# Patient Record
Sex: FEMALE | Race: WHITE | NOT HISPANIC OR LATINO | Employment: FULL TIME | URBAN - METROPOLITAN AREA
[De-identification: names, ages, dates, MRNs, and addresses within clinical notes are randomized per-mention and may not be internally consistent; named-entity substitution may affect disease eponyms.]

---

## 2017-06-23 ENCOUNTER — OFFICE VISIT (OUTPATIENT)
Dept: FAMILY MEDICINE CLINIC | Facility: CLINIC | Age: 62
End: 2017-06-23

## 2017-06-23 VITALS
DIASTOLIC BLOOD PRESSURE: 71 MMHG | HEIGHT: 63 IN | OXYGEN SATURATION: 98 % | SYSTOLIC BLOOD PRESSURE: 145 MMHG | RESPIRATION RATE: 16 BRPM | HEART RATE: 66 BPM | BODY MASS INDEX: 29.41 KG/M2 | WEIGHT: 166 LBS | TEMPERATURE: 98.1 F

## 2017-06-23 DIAGNOSIS — J02.9 SORE THROAT: ICD-10-CM

## 2017-06-23 DIAGNOSIS — M89.8X1 COLLAR BONE PAIN: Primary | ICD-10-CM

## 2017-06-23 PROCEDURE — 99214 OFFICE O/P EST MOD 30 MIN: CPT | Performed by: FAMILY MEDICINE

## 2017-06-23 PROCEDURE — 71020 XR CHEST PA AND LATERAL: CPT | Performed by: FAMILY MEDICINE

## 2017-06-23 PROCEDURE — 73000 X-RAY EXAM OF COLLAR BONE: CPT | Performed by: FAMILY MEDICINE

## 2017-06-23 RX ORDER — PANTOPRAZOLE SODIUM 40 MG/1
40 TABLET, DELAYED RELEASE ORAL DAILY
Qty: 30 TABLET | Refills: 2 | Status: SHIPPED | OUTPATIENT
Start: 2017-06-23 | End: 2017-10-06 | Stop reason: SDUPTHER

## 2017-06-23 NOTE — PATIENT INSTRUCTIONS
Exercise 30 minutes most days of the week  Sleep 6-8 hours each night if possible  Low fat, low cholesterol diet   we discussed prescribed medications and how to take them   make sure you get results of any labs/studies ordered today  Low glycemic index diet  Aleve 1 bid

## 2017-06-23 NOTE — PROGRESS NOTES
"Subjective   Bernadette Perez is a 61 y.o. female.     History of Present Illness   Chief Complaint:   Chief Complaint   Patient presents with   • Collar bone pain'   • Sore Throat       Bernadette Perez 61 y.o. female who presents today for collor bone pain and a sore throat that has been present for 6 months. The pain is gradually getting worse. Her pain in the office today is a 4 out of 10.   She has ? Silent gerd,   Arthritis  Right  Proximal  Clavicle.   she has a history of   Patient Active Problem List   Diagnosis   • Arthritis   • Allergic rhinitis   • Hx of colonic polyps   • Family history of diabetes mellitus type II   • Family history of heart disease   • Hyperlipidemia   • Elevated liver enzymes   .  Since the last visit, she has overall felt well.  she has been compliant with   Current Outpatient Prescriptions:   •  fluticasone (FLONASE) 50 MCG/ACT nasal spray, 1 spray into each nostril daily. Administer 2 sprays in each nostril for each dose., Disp: , Rfl:   •  Oxymetazoline HCl (NASAL SPRAY NA), into each nostril., Disp: , Rfl:   •  triamcinolone (KENALOG) 0.1 % cream, APPLY TO AFFECTED AREA TWICE A DAY AS NEEDED, Disp: , Rfl: 3.  she denies medication side effects.    All of the chronic condition(s) listed above are stable w/o issues.    /71  Pulse 66  Temp 98.1 °F (36.7 °C) (Oral)   Resp 16  Ht 63\" (160 cm)  Wt 166 lb (75.3 kg)  SpO2 98%  BMI 29.41 kg/m2    Results for orders placed or performed in visit on 08/08/16   Hepatic function panel   Result Value Ref Range    Total Protein 7.1 6.0 - 8.5 g/dL    Albumin 4.80 3.50 - 5.20 g/dL    Total Bilirubin 0.6 0.1 - 1.2 mg/dL    Bilirubin, Direct <0.2 0.0 - 0.3 mg/dL    Alkaline Phosphatase 132 (H) 39 - 117 U/L    AST (SGOT) 38 (H) 1 - 32 U/L    ALT (SGPT) 39 (H) 1 - 33 U/L         The following portions of the patient's history were reviewed and updated as appropriate: allergies, current medications, past family history, past medical history, " past social history, past surgical history and problem list.    Review of Systems   Constitutional: Negative for activity change, appetite change and unexpected weight change.   HENT: Positive for sore throat.    Eyes: Negative for visual disturbance.   Respiratory: Negative for chest tightness and shortness of breath.    Cardiovascular: Negative for chest pain and palpitations.   Musculoskeletal:        Collar bone pain   Skin: Negative for color change.   Neurological: Negative for syncope and speech difficulty.   Psychiatric/Behavioral: Negative for confusion and decreased concentration.       Objective   Physical Exam   Constitutional: She is oriented to person, place, and time. She appears well-developed and well-nourished.   HENT:   Head: Normocephalic and atraumatic.   Mouth/Throat: Oropharynx is clear and moist. No oropharyngeal exudate.   Eyes: EOM are normal. Pupils are equal, round, and reactive to light.   Neck: Normal range of motion. Neck supple. No thyromegaly present.   Cardiovascular: Normal rate and regular rhythm.    Pulmonary/Chest: Effort normal and breath sounds normal.   Abdominal: Soft.   Musculoskeletal: She exhibits tenderness.    Tender and swelling right proximal clavicle   Arthritis     Lymphadenopathy:     She has no cervical adenopathy.   Neurological: She is alert and oriented to person, place, and time.   Skin: Skin is warm and dry.   Psychiatric: She has a normal mood and affect. Her behavior is normal.   Nursing note and vitals reviewed.      Assessment/Plan   Bernadette was seen today for collar bone pain' and sore throat.    Diagnoses and all orders for this visit:    Collar bone pain  Comments:  swelling right proximal clavicle  Orders:  -     XR Chest PA & Lateral  -     XR clavicle right    gerd  -     XR Chest PA & Lateral  -     XR clavicle right  -     Ambulatory Referral to ENT (Otolaryngology)    Other orders  -     pantoprazole (PROTONIX) 40 MG EC tablet; Take 1 tablet by  mouth Daily.

## 2017-07-17 ENCOUNTER — OFFICE VISIT (OUTPATIENT)
Dept: FAMILY MEDICINE CLINIC | Facility: CLINIC | Age: 62
End: 2017-07-17

## 2017-07-17 VITALS
HEART RATE: 71 BPM | DIASTOLIC BLOOD PRESSURE: 66 MMHG | WEIGHT: 159 LBS | OXYGEN SATURATION: 95 % | RESPIRATION RATE: 16 BRPM | SYSTOLIC BLOOD PRESSURE: 111 MMHG | HEIGHT: 63 IN | BODY MASS INDEX: 28.17 KG/M2 | TEMPERATURE: 99.2 F

## 2017-07-17 DIAGNOSIS — M19.90 ARTHRITIS: Primary | ICD-10-CM

## 2017-07-17 DIAGNOSIS — K21.9 GASTROESOPHAGEAL REFLUX DISEASE, ESOPHAGITIS PRESENCE NOT SPECIFIED: ICD-10-CM

## 2017-07-17 PROCEDURE — 99213 OFFICE O/P EST LOW 20 MIN: CPT | Performed by: FAMILY MEDICINE

## 2017-07-17 NOTE — PATIENT INSTRUCTIONS
Exercise 30 minutes most days of the week  Sleep 6-8 hours each night if possible  Low fat, low cholesterol diet   we discussed prescribed medications and how to take them   make sure you get results of any labs/studies ordered today  Low glycemic index diet   See me after sees dr MONCADA AND DR CRENSHAW

## 2017-07-17 NOTE — PROGRESS NOTES
"Subjective   Bernadette Perez is a 61 y.o. female.     History of Present Illness   Chief Complaint:   Chief Complaint   Patient presents with   • Heartburn   • Arthritis       Bernadette Perez 61 y.o. female who presents today for a 3 week follow up regarding collor bone pain and a sore throat that has been present for 6 months. The pain is gradually getting worse. Her pain in the office today is a 4-5 out of 10. She has a diagnosis of Arthritis Right Proximal Clavicle and GERD. Throat pain no better with protonix   Sees dr sami mcmahon weeeryan.  she has a history of   Patient Active Problem List   Diagnosis   • Arthritis   • Allergic rhinitis   • Hx of colonic polyps   • Family history of diabetes mellitus type II   • Family history of heart disease   • Hyperlipidemia   • Elevated liver enzymes   • Gastroesophageal reflux disease   .  Since the last visit, she has overall felt well.  she has been compliant with   Current Outpatient Prescriptions:   •  fluticasone (FLONASE) 50 MCG/ACT nasal spray, 1 spray into each nostril daily. Administer 2 sprays in each nostril for each dose., Disp: , Rfl:   •  Oxymetazoline HCl (NASAL SPRAY NA), into each nostril., Disp: , Rfl:   •  pantoprazole (PROTONIX) 40 MG EC tablet, Take 1 tablet by mouth Daily., Disp: 30 tablet, Rfl: 2  •  triamcinolone (KENALOG) 0.1 % cream, APPLY TO AFFECTED AREA TWICE A DAY AS NEEDED, Disp: , Rfl: 3.  she denies medication side effects.    All of the chronic condition(s) listed above are stable w/o issues.    /66  Pulse 71  Temp 99.2 °F (37.3 °C) (Oral)   Resp 16  Ht 63\" (160 cm)  Wt 159 lb (72.1 kg)  SpO2 95%  BMI 28.17 kg/m2    The following portions of the patient's history were reviewed and updated as appropriate: allergies, current medications, past family history, past medical history, past social history, past surgical history and problem list.    Review of Systems   Constitutional: Negative for activity change, appetite change and " unexpected weight change.   Eyes: Negative for visual disturbance.   Respiratory: Negative for chest tightness and shortness of breath.    Cardiovascular: Negative for chest pain and palpitations.   Musculoskeletal:        Clavicle pain   Skin: Negative for color change.   Neurological: Negative for syncope and speech difficulty.   Psychiatric/Behavioral: Negative for confusion and decreased concentration.       Objective   Physical Exam   Constitutional: She is oriented to person, place, and time. She appears well-developed and well-nourished.   HENT:   Head: Normocephalic.   Nose: Nose normal.   Mouth/Throat: Oropharynx is clear and moist.   Eyes: EOM are normal. Pupils are equal, round, and reactive to light.   Neck: Normal range of motion. Neck supple.   Cardiovascular: Normal rate.    Pulmonary/Chest: Effort normal.   Abdominal: Soft.   Musculoskeletal: Normal range of motion. She exhibits tenderness.   Tender and swelling right proximal clavicle   Xray normal   Lymphadenopathy:     She has no cervical adenopathy.   Neurological: She is oriented to person, place, and time.   Skin: Skin is warm and dry.   Psychiatric: She has a normal mood and affect. Her behavior is normal.   Nursing note and vitals reviewed.      Assessment/Plan   Bernadette was seen today for heartburn and arthritis.    Diagnoses and all orders for this visit:    Arthritis  Comments:  right proximal clavicle   Orders:  -     Ambulatory Referral to Orthopedic Surgery    Gastroesophageal reflux disease, esophagitis presence not specified

## 2017-07-25 DIAGNOSIS — M89.8X1 COLLAR BONE PAIN: Primary | ICD-10-CM

## 2017-07-31 ENCOUNTER — HOSPITAL ENCOUNTER (OUTPATIENT)
Dept: CT IMAGING | Facility: HOSPITAL | Age: 62
Discharge: HOME | End: 2017-07-31
Admitting: FAMILY MEDICINE

## 2017-07-31 ENCOUNTER — APPOINTMENT (OUTPATIENT)
Dept: CT IMAGING | Facility: HOSPITAL | Age: 62
End: 2017-07-31

## 2017-07-31 PROCEDURE — 71250 CT THORAX DX C-: CPT

## 2017-08-09 ENCOUNTER — OFFICE VISIT (OUTPATIENT)
Dept: FAMILY MEDICINE CLINIC | Facility: CLINIC | Age: 62
End: 2017-08-09

## 2017-08-09 VITALS
TEMPERATURE: 98.3 F | WEIGHT: 163 LBS | BODY MASS INDEX: 28.88 KG/M2 | SYSTOLIC BLOOD PRESSURE: 132 MMHG | RESPIRATION RATE: 16 BRPM | HEIGHT: 63 IN | OXYGEN SATURATION: 97 % | DIASTOLIC BLOOD PRESSURE: 80 MMHG | HEART RATE: 77 BPM

## 2017-08-09 DIAGNOSIS — M89.8X1 COLLAR BONE PAIN: ICD-10-CM

## 2017-08-09 DIAGNOSIS — K21.9 GASTROESOPHAGEAL REFLUX DISEASE, ESOPHAGITIS PRESENCE NOT SPECIFIED: Primary | ICD-10-CM

## 2017-08-09 DIAGNOSIS — R05.9 COUGH: ICD-10-CM

## 2017-08-09 PROCEDURE — 99214 OFFICE O/P EST MOD 30 MIN: CPT | Performed by: FAMILY MEDICINE

## 2017-08-09 RX ORDER — OLOPATADINE HYDROCHLORIDE 665 UG/1
SPRAY NASAL
Refills: 1 | COMMUNITY
Start: 2017-07-20 | End: 2022-11-15

## 2017-08-09 NOTE — PROGRESS NOTES
"Subjective   Bernadette Perez is a 61 y.o. female.     History of Present Illness   Chief Complaint:   Chief Complaint   Patient presents with   • Right upper chest pain     CT results   • Collar bone pain   • Heartburn       Bernadette Perez 61 y.o. female who presents today to follow up for collar bone pain (neck pain) and a sore throat that has been present for over 6 months. Her pain in the office today is a 2 out of 10. I reviewed her CT scan of her chest. The CT showed that there is either a subacute or ununited chronic chondral fracture at the right 1st costochondral sternal articulation with surrounding bony proliferation and osteophytes. There is no evidence for an acute fracture. There is also asymmetrical degenerative change at the right sternoclavicular joint with synovial thickening and proliferative change. The irregular approximately 8 mm right lower lobe nodular opacity is age indeterminate and needs to be followed. I will get a second reading from radiologist.  She feels coughing from that area she has a history of   Patient Active Problem List   Diagnosis   • Arthritis   • Allergic rhinitis   • Hx of colonic polyps   • Family history of diabetes mellitus type II   • Family history of heart disease   • Hyperlipidemia   • Elevated liver enzymes   • Gastroesophageal reflux disease   .  Since the last visit, she has overall felt well.  she has been compliant with .  she denies medication side effects.    All of the chronic condition(s) listed above are stable w/o issues.    /80  Pulse 77  Temp 98.3 °F (36.8 °C) (Oral)   Resp 16  Ht 63\" (160 cm)  Wt 163 lb (73.9 kg)  SpO2 97%  BMI 28.87 kg/m2    The following portions of the patient's history were reviewed and updated as appropriate: allergies, current medications, past family history, past medical history, past social history, past surgical history and problem list.    Review of Systems   Constitutional: Negative for activity change, appetite " change and unexpected weight change.   Eyes: Negative for visual disturbance.   Respiratory: Negative for apnea and shortness of breath.    Cardiovascular: Negative for chest pain and palpitations.   Musculoskeletal: Positive for neck pain.   Skin: Negative for color change.   Neurological: Negative for syncope and speech difficulty.   Psychiatric/Behavioral: Negative for confusion and decreased concentration.       Objective   Physical Exam   Constitutional: She is oriented to person, place, and time. She appears well-developed and well-nourished.   HENT:   Head: Normocephalic.   Mouth/Throat: Oropharynx is clear and moist.   Eyes: Pupils are equal, round, and reactive to light.   Neck: Neck supple. No tracheal deviation present. No thyromegaly present.   Pulmonary/Chest: Breath sounds normal.   Musculoskeletal: Normal range of motion.   Swelling right sterno-clavicular joint   Lymphadenopathy:     She has no cervical adenopathy.   Neurological: She is alert and oriented to person, place, and time.   Skin: No rash noted.       Assessment/Plan   Bernadette was seen today for right upper chest pain, collar bone pain and heartburn.    Diagnoses and all orders for this visit:    Gastroesophageal reflux disease, esophagitis presence not specified  -     US Thyroid    Collar bone pain  -     US Thyroid    Cough  -     US Thyroid

## 2017-08-09 NOTE — PATIENT INSTRUCTIONS
Exercise 30 minutes most days of the week  Sleep 6-8 hours each night if possible  Low fat, low cholesterol diet   we discussed prescribed medications and how to take them   make sure you get results of any labs/studies ordered today  Low glycemic index diet     Sees dr live      I will talk with radiologist

## 2017-08-15 ENCOUNTER — HOSPITAL ENCOUNTER (OUTPATIENT)
Dept: ULTRASOUND IMAGING | Facility: HOSPITAL | Age: 62
Discharge: HOME | End: 2017-08-15
Admitting: FAMILY MEDICINE

## 2017-08-15 PROCEDURE — 76536 US EXAM OF HEAD AND NECK: CPT

## 2017-08-22 ENCOUNTER — OFFICE VISIT (OUTPATIENT)
Dept: ORTHOPEDIC SURGERY | Facility: CLINIC | Age: 62
End: 2017-08-22

## 2017-08-22 VITALS — BODY MASS INDEX: 28.95 KG/M2 | TEMPERATURE: 97.9 F | WEIGHT: 163.4 LBS | HEIGHT: 63 IN

## 2017-08-22 DIAGNOSIS — M89.8X1 PAIN OF RIGHT CLAVICLE: Primary | ICD-10-CM

## 2017-08-22 PROCEDURE — 73000 X-RAY EXAM OF COLLAR BONE: CPT | Performed by: ORTHOPAEDIC SURGERY

## 2017-08-22 PROCEDURE — 99203 OFFICE O/P NEW LOW 30 MIN: CPT | Performed by: ORTHOPAEDIC SURGERY
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
## 2017-10-06 RX ORDER — PANTOPRAZOLE SODIUM 40 MG/1
TABLET, DELAYED RELEASE ORAL
Qty: 30 TABLET | Refills: 0 | Status: SHIPPED | OUTPATIENT
Start: 2017-10-06 | End: 2018-04-11

## 2017-10-12 ENCOUNTER — APPOINTMENT (OUTPATIENT)
Dept: WOMENS IMAGING | Facility: HOSPITAL | Age: 62
End: 2017-10-12

## 2017-10-12 PROCEDURE — 77067 SCR MAMMO BI INCL CAD: CPT | Performed by: RADIOLOGY

## 2017-10-12 PROCEDURE — 77063 BREAST TOMOSYNTHESIS BI: CPT | Performed by: RADIOLOGY

## 2017-12-04 DIAGNOSIS — R91.1 NODULE OF RIGHT LUNG: Primary | ICD-10-CM

## 2017-12-04 DIAGNOSIS — R93.89 ABNORMAL CHEST CT: ICD-10-CM

## 2017-12-04 RX ORDER — PANTOPRAZOLE SODIUM 40 MG/1
TABLET, DELAYED RELEASE ORAL
Qty: 30 TABLET | Refills: 0 | OUTPATIENT
Start: 2017-12-04

## 2017-12-07 ENCOUNTER — DOCUMENTATION (OUTPATIENT)
Dept: FAMILY MEDICINE CLINIC | Facility: CLINIC | Age: 62
End: 2017-12-07

## 2017-12-07 NOTE — PROGRESS NOTES
She needs appointment  After ctscan chest  And needs labs and I am waiting for call dr leroy about us thyroid

## 2017-12-11 ENCOUNTER — HOSPITAL ENCOUNTER (OUTPATIENT)
Dept: CT IMAGING | Facility: HOSPITAL | Age: 62
Discharge: HOME | End: 2017-12-11
Admitting: FAMILY MEDICINE

## 2017-12-11 PROCEDURE — 0 IOPAMIDOL 61 % SOLUTION: Performed by: FAMILY MEDICINE

## 2017-12-11 PROCEDURE — 71260 CT THORAX DX C+: CPT

## 2017-12-11 PROCEDURE — 82565 ASSAY OF CREATININE: CPT

## 2017-12-11 RX ADMIN — IOPAMIDOL 75 ML: 612 INJECTION, SOLUTION INTRAVENOUS at 08:15

## 2017-12-13 LAB — CREAT BLDA-MCNC: 0.8 MG/DL (ref 0.6–1.3)

## 2017-12-27 ENCOUNTER — OFFICE VISIT (OUTPATIENT)
Dept: FAMILY MEDICINE CLINIC | Facility: CLINIC | Age: 62
End: 2017-12-27

## 2017-12-27 VITALS
DIASTOLIC BLOOD PRESSURE: 77 MMHG | BODY MASS INDEX: 29.77 KG/M2 | HEIGHT: 63 IN | SYSTOLIC BLOOD PRESSURE: 133 MMHG | RESPIRATION RATE: 16 BRPM | HEART RATE: 77 BPM | WEIGHT: 168 LBS | TEMPERATURE: 98 F | OXYGEN SATURATION: 98 %

## 2017-12-27 DIAGNOSIS — R91.1 NODULE OF RIGHT LUNG: Primary | ICD-10-CM

## 2017-12-27 PROCEDURE — 99214 OFFICE O/P EST MOD 30 MIN: CPT | Performed by: FAMILY MEDICINE

## 2018-04-11 ENCOUNTER — OFFICE VISIT (OUTPATIENT)
Dept: FAMILY MEDICINE CLINIC | Facility: CLINIC | Age: 63
End: 2018-04-11

## 2018-04-11 VITALS
WEIGHT: 168 LBS | RESPIRATION RATE: 16 BRPM | DIASTOLIC BLOOD PRESSURE: 82 MMHG | OXYGEN SATURATION: 98 % | TEMPERATURE: 98.2 F | HEART RATE: 68 BPM | SYSTOLIC BLOOD PRESSURE: 136 MMHG | BODY MASS INDEX: 29.77 KG/M2 | HEIGHT: 63 IN

## 2018-04-11 DIAGNOSIS — F32.A DEPRESSION, UNSPECIFIED DEPRESSION TYPE: Primary | ICD-10-CM

## 2018-04-11 DIAGNOSIS — F41.9 ANXIETY: ICD-10-CM

## 2018-04-11 PROCEDURE — 99214 OFFICE O/P EST MOD 30 MIN: CPT | Performed by: FAMILY MEDICINE

## 2018-04-11 RX ORDER — PAROXETINE HYDROCHLORIDE 20 MG/1
20 TABLET, FILM COATED ORAL EVERY MORNING
Qty: 30 TABLET | Refills: 2 | Status: SHIPPED | OUTPATIENT
Start: 2018-04-11 | End: 2018-07-05 | Stop reason: SDUPTHER

## 2018-04-11 NOTE — PATIENT INSTRUCTIONS
Exercise 30 minutes most days of the week  Sleep 6-8 hours each night if possible  Low fat, low cholesterol diet   we discussed prescribed medications and how to take them   make sure you get results of any labs/studies ordered today  Low glycemic index diet     Do counselling thru insuuance card  Call if any problems

## 2018-04-11 NOTE — PROGRESS NOTES
"Subjective   Bernadette Perez is a 62 y.o. female.     History of Present Illness   Chief Complaint:   Chief Complaint   Patient presents with   • Depression   • Anxiety       Bernadette Perez 62 y.o. female who presents today with complaints of anxiety and depression that has been gradually getting worse over the last 6 months. She was on Paxil 8 years ago during menopause. Her depression score was 17.  Will start paxil  20mg  Take 10mg  1st 4 days  See me 1 month later  Start counselling thru insurance card or [employer].    she has a problem list of   Patient Active Problem List   Diagnosis   • Arthritis   • Allergic rhinitis   • Hx of colonic polyps   • Family history of diabetes mellitus type II   • Family history of heart disease   • Hyperlipidemia   • Elevated liver enzymes   • Gastroesophageal reflux disease   .  Since the last visit, she has overall felt well.  she has been compliant with .  she denies medication side effects.    All of the chronic condition(s) listed above are stable w/o issues.    /82   Pulse 68   Temp 98.2 °F (36.8 °C) (Oral)   Resp 16   Ht 160 cm (63\")   Wt 76.2 kg (168 lb)   SpO2 98%   BMI 29.76 kg/m²     The following portions of the patient's history were reviewed and updated as appropriate: allergies, current medications, past family history, past medical history, past social history, past surgical history and problem list.    Review of Systems   Constitutional: Negative for activity change, appetite change, fatigue and unexpected weight change.   HENT: Negative for congestion.    Respiratory: Negative for chest tightness and shortness of breath.    Cardiovascular: Negative for chest pain and palpitations.   Gastrointestinal: Negative for abdominal pain and constipation.   Endocrine: Negative for cold intolerance, heat intolerance, polydipsia, polyphagia and polyuria.   Genitourinary: Negative for difficulty urinating, dysuria and menstrual problem.   Musculoskeletal: " Negative for arthralgias.   Skin: Negative for rash.   Neurological: Negative for headaches.   Psychiatric/Behavioral: Negative for behavioral problems, dysphoric mood and sleep disturbance.       Objective   Physical Exam   Constitutional: She is oriented to person, place, and time. She appears well-developed and well-nourished.   HENT:   Head: Normocephalic.   Eyes: Pupils are equal, round, and reactive to light.   Neck: Normal range of motion.   Cardiovascular: Normal rate and regular rhythm.    Pulmonary/Chest: Effort normal.   Neurological: She is alert and oriented to person, place, and time. She has normal reflexes.   Skin: Skin is warm.   Psychiatric: She has a normal mood and affect.   Nursing note and vitals reviewed.      Assessment/Plan   Bernadette was seen today for depression and anxiety.    Diagnoses and all orders for this visit:    Depression, unspecified depression type    Anxiety    Other orders  -     PARoxetine (PAXIL) 20 MG tablet; Take 1 tablet by mouth Every Morning.

## 2018-05-08 ENCOUNTER — OFFICE VISIT (OUTPATIENT)
Dept: FAMILY MEDICINE CLINIC | Facility: CLINIC | Age: 63
End: 2018-05-08

## 2018-05-08 VITALS
SYSTOLIC BLOOD PRESSURE: 130 MMHG | HEIGHT: 63 IN | RESPIRATION RATE: 16 BRPM | WEIGHT: 167 LBS | BODY MASS INDEX: 29.59 KG/M2 | TEMPERATURE: 97.4 F | OXYGEN SATURATION: 97 % | DIASTOLIC BLOOD PRESSURE: 70 MMHG | HEART RATE: 68 BPM

## 2018-05-08 DIAGNOSIS — F41.8 DEPRESSION WITH ANXIETY: Primary | ICD-10-CM

## 2018-05-08 PROCEDURE — 99213 OFFICE O/P EST LOW 20 MIN: CPT | Performed by: FAMILY MEDICINE

## 2018-05-08 NOTE — PATIENT INSTRUCTIONS
Exercise 30 minutes most days of the week  Sleep 6-8 hours each night if possible  Low fat, low cholesterol diet   we discussed prescribed medications and how to take them   make sure you get results of any labs/studies ordered today  Low glycemic index diet     Call if any problems   See me 3 months

## 2018-07-04 RX ORDER — PAROXETINE HYDROCHLORIDE 20 MG/1
20 TABLET, FILM COATED ORAL EVERY MORNING
Qty: 30 TABLET | Refills: 2 | Status: CANCELLED | OUTPATIENT
Start: 2018-07-04

## 2018-07-05 ENCOUNTER — TELEPHONE (OUTPATIENT)
Dept: FAMILY MEDICINE CLINIC | Facility: CLINIC | Age: 63
End: 2018-07-05

## 2018-07-05 RX ORDER — PAROXETINE HYDROCHLORIDE 20 MG/1
20 TABLET, FILM COATED ORAL EVERY MORNING
Qty: 90 TABLET | Refills: 0 | Status: SHIPPED | OUTPATIENT
Start: 2018-07-05 | End: 2018-10-02 | Stop reason: SDUPTHER

## 2018-07-10 ENCOUNTER — HOSPITAL ENCOUNTER (OUTPATIENT)
Dept: CT IMAGING | Facility: HOSPITAL | Age: 63
Discharge: HOME | End: 2018-07-10
Admitting: FAMILY MEDICINE

## 2018-07-10 DIAGNOSIS — R91.1 NODULE OF RIGHT LUNG: ICD-10-CM

## 2018-07-10 LAB — CREAT BLDA-MCNC: 0.8 MG/DL (ref 0.6–1.3)

## 2018-07-10 PROCEDURE — 82565 ASSAY OF CREATININE: CPT

## 2018-07-10 PROCEDURE — 71260 CT THORAX DX C+: CPT

## 2018-07-10 PROCEDURE — 25010000002 IOPAMIDOL 61 % SOLUTION: Performed by: FAMILY MEDICINE

## 2018-07-10 RX ADMIN — IOPAMIDOL 75 ML: 612 INJECTION, SOLUTION INTRAVENOUS at 09:41

## 2018-07-13 ENCOUNTER — OFFICE VISIT (OUTPATIENT)
Dept: FAMILY MEDICINE CLINIC | Facility: CLINIC | Age: 63
End: 2018-07-13

## 2018-07-13 VITALS
TEMPERATURE: 98.1 F | DIASTOLIC BLOOD PRESSURE: 74 MMHG | WEIGHT: 169 LBS | RESPIRATION RATE: 16 BRPM | HEART RATE: 82 BPM | HEIGHT: 63 IN | OXYGEN SATURATION: 95 % | SYSTOLIC BLOOD PRESSURE: 123 MMHG | BODY MASS INDEX: 29.95 KG/M2

## 2018-07-13 DIAGNOSIS — L24.9 IRRITANT CONTACT DERMATITIS, UNSPECIFIED TRIGGER: Primary | ICD-10-CM

## 2018-07-13 PROCEDURE — 99213 OFFICE O/P EST LOW 20 MIN: CPT | Performed by: NURSE PRACTITIONER

## 2018-07-13 RX ORDER — METHYLPREDNISOLONE 4 MG/1
TABLET ORAL
Qty: 21 TABLET | Refills: 0 | Status: SHIPPED | OUTPATIENT
Start: 2018-07-13 | End: 2018-07-17

## 2018-07-13 NOTE — PROGRESS NOTES
Subjective   Bernadette Perez is a 62 y.o. female.     History of Present Illness   Bernadette Perez 62 y.o. female presents for evaluation of a rash involving the left upper extremity. Rash has been present for: a few days. Lesions are erythematous, and are described as papular and grouped. Rash has not changed over time. Rash is painful and is pruritic. Associated symptoms  .rash started the night after patient had CT with contrast.  Rash is at injection site.  She did not have issue after CT with contrast in December.  Patient denies:abdominal pain, arthralgia, decrease in appetite, decrease in energy level, fever, headache, irritability, myalgia, nausea and vomiting..  Treatment to date includes: none without improvement. Symptoms are gradually worsening. Patient has not had contacts with similar rash. Patient has not had new exposures (soaps, lotions, laundry detergents, foods, medications, plants, insects or animals).    The following portions of the patient's history were reviewed and updated as appropriate: allergies, current medications, past family history, past medical history, past social history, past surgical history and problem list.    Review of Systems   Constitutional: Negative for chills and fever.   Gastrointestinal: Negative for abdominal pain, diarrhea, nausea and vomiting.   Musculoskeletal: Negative for arthralgias and myalgias.   Skin: Positive for rash.   Neurological: Negative for dizziness and headaches.       Objective   Physical Exam   Constitutional: She is oriented to person, place, and time. She appears well-developed and well-nourished.   Pulmonary/Chest: Effort normal.   Neurological: She is alert and oriented to person, place, and time.   Skin: Rash noted. Rash is maculopapular.        Erythematous maculopapular eruption to left antecubital fossa.  No warmth or drainage noted.    Psychiatric: She has a normal mood and affect. Judgment normal.   Nursing note and vitals  reviewed.      Assessment/Plan   Bernadette was seen today for bleeding/bruising and rash.    Diagnoses and all orders for this visit:    Irritant contact dermatitis, unspecified trigger  -     MethylPREDNISolone (MEDROLJONATHAN,) 4 MG tablet; Take as directed on package instructions.

## 2018-07-17 ENCOUNTER — OFFICE VISIT (OUTPATIENT)
Dept: FAMILY MEDICINE CLINIC | Facility: CLINIC | Age: 63
End: 2018-07-17

## 2018-07-17 VITALS
BODY MASS INDEX: 29.59 KG/M2 | HEART RATE: 78 BPM | WEIGHT: 167 LBS | DIASTOLIC BLOOD PRESSURE: 80 MMHG | RESPIRATION RATE: 18 BRPM | SYSTOLIC BLOOD PRESSURE: 132 MMHG | OXYGEN SATURATION: 98 % | TEMPERATURE: 98.2 F | HEIGHT: 63 IN

## 2018-07-17 DIAGNOSIS — R91.1 PULMONARY NODULE, RIGHT: Primary | ICD-10-CM

## 2018-07-17 PROCEDURE — 99214 OFFICE O/P EST MOD 30 MIN: CPT | Performed by: FAMILY MEDICINE

## 2018-07-17 NOTE — PATIENT INSTRUCTIONS
Exercise 30 minutes most days of the week  Sleep 6-8 hours each night if possible  Low fat, low cholesterol diet   we discussed prescribed medications and how to take them   make sure you get results of any labs/studies ordered today  Low glycemic index diet     Repeat ct scan 1 year  Will talk to radiologist  ALSO NEED  REPEAT US THYROID

## 2018-07-17 NOTE — PROGRESS NOTES
"Subjective   Bernadette Perez is a 62 y.o. female.     History of Present Illness   Chief Complaint: pt is here today to get results of recent ct scan  Chief Complaint   Patient presents with   • Lung Nodule     follow up 6mo CT       Bernadette Perez 62 y.o. female who presents today for Medical Management of the below listed issues and medication refills.  she has a problem list of  I reviewed ct scan chest and same   Will talk to radiologist  Will repeat 1 year   ALSO ASK RADIOLOGIST ABOUT US THYROID    .8 CM PULMONARY NODULE  RIGHT LOWER LOBE.  Patient Active Problem List   Diagnosis   • Arthritis   • Allergic rhinitis   • Hx of colonic polyps   • Family history of diabetes mellitus type II   • Family history of heart disease   • Hyperlipidemia   • Elevated liver enzymes   • Gastroesophageal reflux disease   • Depression with anxiety   .  Since the last visit, she has overall felt well.  she has been compliant with .  she denies medication side effects.    All of the chronic condition(s) listed above are stable w/o issues.  Ct scan reviewed and same  No chane  Will talk to radiologist and repeat 1 year  Had local reaction to contrast  Better now    /80   Pulse 78   Temp 98.2 °F (36.8 °C) (Oral)   Resp 18   Ht 160 cm (63\")   Wt 75.8 kg (167 lb)   SpO2 98%   BMI 29.58 kg/m²     Results for orders placed or performed during the hospital encounter of 07/10/18   POC Creatinine   Result Value Ref Range    Creatinine 0.80 0.60 - 1.30 mg/dL           The following portions of the patient's history were reviewed and updated as appropriate: allergies, current medications, past family history, past medical history, past social history, past surgical history and problem list.    Review of Systems   Constitutional: Negative.    HENT: Negative.    Eyes: Negative.    Respiratory: Negative.    Cardiovascular: Negative.    Gastrointestinal: Negative.    Musculoskeletal: Negative.    Skin: Negative.    Neurological: " Negative.    Psychiatric/Behavioral: Negative.        Objective   Physical Exam   HENT:   Head: Normocephalic.   Eyes: Pupils are equal, round, and reactive to light.   Neck: Normal range of motion. Neck supple.   Prominence   Right clavicle   Cardiovascular: Normal rate and regular rhythm.    Pulmonary/Chest: Effort normal and breath sounds normal. She has no rales. She exhibits no tenderness.   Abdominal: Soft. Bowel sounds are normal.   Skin: Skin is warm and dry.       Assessment/Plan   Bernadette was seen today for lung nodule.    Diagnoses and all orders for this visit:    Pulmonary nodule, right

## 2018-09-10 ENCOUNTER — TELEPHONE (OUTPATIENT)
Dept: FAMILY MEDICINE CLINIC | Facility: CLINIC | Age: 63
End: 2018-09-10

## 2018-09-11 NOTE — TELEPHONE ENCOUNTER
Message   Received: Yesterday   Message Contents   Teodoro Perea MD Meredith, Amy, LPN             Ask her about tens unit    Who ordered it for her  I would not order it  ///ORTHO???  SEE WHAT YOU CAN FIND OUT???

## 2018-09-13 NOTE — TELEPHONE ENCOUNTER
Message   Received: Yesterday   Message Contents   Teodoro Perea MD Meredith, Amy, LPN             Call patient and make appointment so I can document in chart need for tens unit etc

## 2018-09-18 ENCOUNTER — OFFICE VISIT (OUTPATIENT)
Dept: FAMILY MEDICINE CLINIC | Facility: CLINIC | Age: 63
End: 2018-09-18

## 2018-09-18 VITALS
OXYGEN SATURATION: 96 % | DIASTOLIC BLOOD PRESSURE: 71 MMHG | SYSTOLIC BLOOD PRESSURE: 114 MMHG | RESPIRATION RATE: 16 BRPM | WEIGHT: 167 LBS | TEMPERATURE: 98 F | HEART RATE: 80 BPM | BODY MASS INDEX: 29.59 KG/M2 | HEIGHT: 63 IN

## 2018-09-18 DIAGNOSIS — E04.2 MULTIPLE THYROID NODULES: Primary | ICD-10-CM

## 2018-09-18 DIAGNOSIS — G89.29 CHRONIC MIDLINE LOW BACK PAIN WITHOUT SCIATICA: ICD-10-CM

## 2018-09-18 DIAGNOSIS — M54.50 CHRONIC MIDLINE LOW BACK PAIN WITHOUT SCIATICA: ICD-10-CM

## 2018-09-18 PROCEDURE — 99214 OFFICE O/P EST MOD 30 MIN: CPT | Performed by: FAMILY MEDICINE

## 2018-09-18 NOTE — PROGRESS NOTES
"Subjective   Chief Complaint:   Chief Complaint   Patient presents with   • Back Pain     Needs RX for new Tens Unit         History of Present Illness to the office today because she is in need of a new tens unit.  This is for her low back pain.  I read the instructions from her last TENS unit and copied it  on a prescription pad and gave it to her to take  2  [place].  She places 2 pads on her lower back and continues it for 20 minutes.  So I really read a ultrasound she did a year ago for thyroid.  I talked to Dr. Yost about her thyroid ultrasound.  She had  thyroid nodules on her thyroid scan and there was one on each right and left side.  In talking to Dr. Yost he thought it would be a good idea to repeat her ultrasound after a year.  So I'm going to repeat the ultrasound of her thyroid and we'll call her the report.no symptoms. See her us of thyroid report,            Bernadette Perez 62 y.o. female who presents today for Medical Management of the below listed issues and medication refills.    ICD-10-CM ICD-9-CM   1. Multiple thyroid nodules E04.2 241.1   2. Chronic midline low back pain without sciatica M54.5 724.2    G89.29 338.29        she has a problem list of   Patient Active Problem List   Diagnosis   • Arthritis   • Allergic rhinitis   • Hx of colonic polyps   • Family history of diabetes mellitus type II   • Family history of heart disease   • Hyperlipidemia   • Elevated liver enzymes   • Gastroesophageal reflux disease   • Depression with anxiety   .  Since the last visit, she has overall felt well.  she has been compliant with .  she denies medication side effects.    All of the chronic condition(s) listed above are stable w/o issues.    /71   Pulse 80   Temp 98 °F (36.7 °C)   Resp 16   Ht 160 cm (63\")   Wt 75.8 kg (167 lb)   SpO2 96%   BMI 29.58 kg/m²     Results for orders placed or performed during the hospital encounter of 07/10/18   POC Creatinine   Result Value Ref Range    " Creatinine 0.80 0.60 - 1.30 mg/dL             The following portions of the patient's history were reviewed and updated as appropriate: allergies, current medications, past family history, past medical history, past social history, past surgical history and problem list.    Review of Systems   Constitutional: Negative for activity change, appetite change and unexpected weight change.   HENT: Negative for sinus pressure, sore throat and trouble swallowing.          prominent area of the proximal end of her right clavicle  still present.  Will get ultrasound and's [facility] and we'll go from there.     Eyes: Negative for discharge and visual disturbance.   Respiratory: Negative for chest tightness and shortness of breath.    Cardiovascular: Negative for chest pain and palpitations.   Gastrointestinal: Negative for abdominal distention and abdominal pain.   Musculoskeletal: Positive for back pain.   Skin: Negative for color change.   Neurological: Negative for dizziness, syncope and speech difficulty.   Psychiatric/Behavioral: Negative for confusion and decreased concentration.       Objective   Physical Exam   Constitutional: She is oriented to person, place, and time. She appears well-developed and well-nourished.   HENT:   Right Ear: External ear normal.   Left Ear: External ear normal.   Mouth/Throat: Oropharynx is clear and moist.   Eyes: Pupils are equal, round, and reactive to light.   Neck: Normal range of motion. No tracheal deviation present. No thyromegaly present.   Cardiovascular: Normal rate and regular rhythm.    Pulmonary/Chest: Effort normal and breath sounds normal.   Abdominal: Soft. Bowel sounds are normal.   Lymphadenopathy:     She has no cervical adenopathy.   Neurological: She is alert and oriented to person, place, and time.   Psychiatric: She has a normal mood and affect. Her behavior is normal.   Nursing note and vitals reviewed.      Assessment/Plan   Bernadette was seen today for back  pain.    Diagnoses and all orders for this visit:    Multiple thyroid nodules  -     US Thyroid    Chronic midline low back pain without sciatica

## 2018-09-18 NOTE — PATIENT INSTRUCTIONS
Exercise 30 minutes most days of the week  Sleep 6-8 hours each night if possible  Low fat, low cholesterol diet   we discussed prescribed medications and how to take them   make sure you get results of any labs/studies ordered today  Low glycemic index diet   Thyroid us  [facility]      Tens unit ordered

## 2018-09-25 ENCOUNTER — HOSPITAL ENCOUNTER (OUTPATIENT)
Dept: ULTRASOUND IMAGING | Facility: HOSPITAL | Age: 63
Discharge: HOME | End: 2018-09-25
Admitting: FAMILY MEDICINE

## 2018-09-25 PROCEDURE — 76536 US EXAM OF HEAD AND NECK: CPT

## 2018-10-02 RX ORDER — PAROXETINE HYDROCHLORIDE 20 MG/1
TABLET, FILM COATED ORAL
Qty: 90 TABLET | Refills: 0 | Status: SHIPPED | OUTPATIENT
Start: 2018-10-02 | End: 2022-11-15

## 2018-11-27 ENCOUNTER — OFFICE VISIT (OUTPATIENT)
Dept: ORTHOPEDIC SURGERY | Facility: CLINIC | Age: 63
End: 2018-11-27

## 2018-11-27 VITALS — TEMPERATURE: 98.1 F | HEIGHT: 63 IN | WEIGHT: 165 LBS | BODY MASS INDEX: 29.23 KG/M2

## 2018-11-27 DIAGNOSIS — M25.562 ACUTE PAIN OF LEFT KNEE: Primary | ICD-10-CM

## 2018-11-27 DIAGNOSIS — M25.861 PATELLOFEMORAL DYSFUNCTION OF RIGHT KNEE: ICD-10-CM

## 2018-11-27 PROCEDURE — 20610 DRAIN/INJ JOINT/BURSA W/O US: CPT | Performed by: ORTHOPAEDIC SURGERY

## 2018-11-27 PROCEDURE — 99214 OFFICE O/P EST MOD 30 MIN: CPT | Performed by: ORTHOPAEDIC SURGERY

## 2018-11-27 PROCEDURE — 73562 X-RAY EXAM OF KNEE 3: CPT | Performed by: ORTHOPAEDIC SURGERY

## 2018-11-27 RX ADMIN — METHYLPREDNISOLONE ACETATE 80 MG: 80 INJECTION, SUSPENSION INTRA-ARTICULAR; INTRALESIONAL; INTRAMUSCULAR; SOFT TISSUE at 16:25

## 2018-11-27 RX ADMIN — LIDOCAINE HYDROCHLORIDE 4 ML: 10 INJECTION, SOLUTION EPIDURAL; INFILTRATION; INTRACAUDAL; PERINEURAL at 16:25

## 2018-11-27 NOTE — PROGRESS NOTES
New Left Knee      Patient: Bernadette Perez        YOB: 1955    Medical Record Number: [id number]        Chief Complaints: left knee pain  Chief Complaint   Patient presents with   • Left Knee - Establish Care           History of Present Illness: This is a 62-year-old female who presents complaining of left knee pain is been ongoing about 2 months no real history of injury change in activity symptoms are moderate intermittent aching burning swelling worse with walking better with rest is a  she thinks she perhaps was pulling weeds in that started it but she is unsure past medical history marked for melanoma rheumatoid arthritis depression anxiety        Allergies:   Allergies   Allergen Reactions   • Celebrex [Celecoxib] Other (See Comments)     Causes liver problems   • Codeine    • Mobic [Meloxicam] Other (See Comments)     Effects liver   • Penicillins        Medications:   Home Medications:    Current Medications:  Scheduled Meds:  Continuous Infusions:  No current facility-administered medications for this visit.   PRN Meds:.    Past Medical History:   Diagnosis Date   • Allergic     ALLERGY INJECTIONS - ALLERGY FIRST EVERY 3 WEEKS    • Arthritis    • Colon polyp    • H/O bone density study 2016    dr gambino - [facility]   • H/O complete eye exam 06/2016        Past Surgical History:   Procedure Laterality Date   • COLONOSCOPY  due    polyps    • EYE SURGERY     • HAND SURGERY     • HEMORRHOIDECTOMY     • HYSTERECTOMY     • MAMMO BILATERAL  08/206    SCHEDULED - DR GAMBINO    • PAP SMEAR  8/2016    SCHEDULED DR GAMBINO / [facility]         Social History     Occupational History   • Not on file   Tobacco Use   • Smoking status: Never Smoker   • Smokeless tobacco: Never Used   Substance and Sexual Activity   • Alcohol use: Yes     Comment: social   • Drug use: No   • Sexual activity: Defer    Social History     Social History Narrative   • Not on file        Family History  Please refill message left   Problem Relation Age of Onset   • Mental illness Father    • Heart disease Other    • Hypertension Other    • Diabetes Other    • Cancer Other    • Thyroid disease Other    • Arthritis Other              Review of Systems:      Review of Systems      Physical Exam: 62 y.o. female  General Appearance:    Alert, cooperative, in no acute distress                 There were no vitals filed for this visit.   Patient is alert and read ×3 no acute distress appears her above-listed at height weight and age.  Affect is normal respiratory rate is normal unlabored. Heart rate regular rate rhythm, sclera, dentition and hearing are normal for the purpose of this exam.        Ortho Exam Physical exam of the left knee reveals no effusion, no erythema.  The patient has no palpable tenderness along the medial joint line, no tenderness about the lateral joint line.  Patient does have crepitus with patellofemoral range of motion.  They also have subjective symptoms anteriorly during exam.  The patient has a negative bounce home, negative Braulio and a stable ligamentous exam.  Quad tone is reasonable and symmetric.  There are no overlying skin changes no lymphedema no lymphadenopathy.  There is good hip range of motion which is full symmetric and asymptomatic and a normal ankle exam.  Hamstrings and IT band are tight bilaterally.      Large Joint Arthrocentesis: L knee  Date/Time: 11/27/2018 4:25 PM  Consent given by: patient  Site marked: site marked  Timeout: Immediately prior to procedure a time out was called to verify the correct patient, procedure, equipment, support staff and site/side marked as required   Supporting Documentation  Indications: pain   Procedure Details  Location: knee - L knee  Preparation: Patient was prepped and draped in the usual sterile fashion  Needle size: 22 G  Approach: anteromedial  Medications administered: 4 mL lidocaine PF 1% 1 %; 80 mg methylPREDNISolone acetate 80 MG/ML                      Radiology:   AP, Lateral and merchant views of the left knee  were ordered/reviewed to evauateknee pain.  Have no comparative films she has patella alter but no other evidence of any acute bony pathology  Imaging Results (most recent)     Procedure Component Value Units Date/Time    XR Knee 3 View Left [[id number]] Resulted:  11/27/18 1556     Updated:  11/27/18 1556    Impression:       Ordering physician's impression is located in the Encounter Note dated 11/27/18. X-ray performed in the DR room.          Assessment/Plan:    Left knee pain I really think this is more patellofemoral think she benefit from an injection she fails improvement with that we will pursue other means of testing

## 2018-11-29 RX ORDER — METHYLPREDNISOLONE ACETATE 80 MG/ML
80 INJECTION, SUSPENSION INTRA-ARTICULAR; INTRALESIONAL; INTRAMUSCULAR; SOFT TISSUE
Status: COMPLETED | OUTPATIENT
Start: 2018-11-27 | End: 2018-11-27

## 2018-11-29 RX ORDER — LIDOCAINE HYDROCHLORIDE 10 MG/ML
4 INJECTION, SOLUTION EPIDURAL; INFILTRATION; INTRACAUDAL; PERINEURAL
Status: COMPLETED | OUTPATIENT
Start: 2018-11-27 | End: 2018-11-27

## 2019-02-25 ENCOUNTER — APPOINTMENT (OUTPATIENT)
Dept: WOMENS IMAGING | Facility: HOSPITAL | Age: 64
End: 2019-02-25

## 2019-02-25 PROCEDURE — 77067 SCR MAMMO BI INCL CAD: CPT | Performed by: RADIOLOGY

## 2019-02-25 PROCEDURE — 77063 BREAST TOMOSYNTHESIS BI: CPT | Performed by: RADIOLOGY

## 2022-11-15 ENCOUNTER — OFFICE VISIT (OUTPATIENT)
Dept: INTERNAL MEDICINE | Facility: CLINIC | Age: 67
End: 2022-11-15

## 2022-11-15 VITALS
DIASTOLIC BLOOD PRESSURE: 88 MMHG | WEIGHT: 164.4 LBS | TEMPERATURE: 97.8 F | BODY MASS INDEX: 29.13 KG/M2 | SYSTOLIC BLOOD PRESSURE: 142 MMHG | HEIGHT: 63 IN | HEART RATE: 82 BPM | OXYGEN SATURATION: 98 %

## 2022-11-15 DIAGNOSIS — Z12.11 ENCOUNTER FOR SCREENING COLONOSCOPY: ICD-10-CM

## 2022-11-15 DIAGNOSIS — R05.1 ACUTE COUGH: ICD-10-CM

## 2022-11-15 DIAGNOSIS — I10 PRIMARY HYPERTENSION: ICD-10-CM

## 2022-11-15 DIAGNOSIS — Z00.00 ANNUAL PHYSICAL EXAM: Primary | ICD-10-CM

## 2022-11-15 PROCEDURE — 99204 OFFICE O/P NEW MOD 45 MIN: CPT | Performed by: STUDENT IN AN ORGANIZED HEALTH CARE EDUCATION/TRAINING PROGRAM

## 2022-11-15 PROCEDURE — G0009 ADMIN PNEUMOCOCCAL VACCINE: HCPCS | Performed by: STUDENT IN AN ORGANIZED HEALTH CARE EDUCATION/TRAINING PROGRAM

## 2022-11-15 PROCEDURE — 90677 PCV20 VACCINE IM: CPT | Performed by: STUDENT IN AN ORGANIZED HEALTH CARE EDUCATION/TRAINING PROGRAM

## 2022-11-15 RX ORDER — AZELASTINE HYDROCHLORIDE 137 UG/1
SPRAY, METERED NASAL
COMMUNITY
Start: 2022-10-25

## 2022-11-15 RX ORDER — BENZONATATE 100 MG/1
100 CAPSULE ORAL 3 TIMES DAILY PRN
Qty: 30 CAPSULE | Refills: 0 | Status: SHIPPED | OUTPATIENT
Start: 2022-11-15 | End: 2022-12-13

## 2022-11-15 RX ORDER — LOSARTAN POTASSIUM 25 MG/1
25 TABLET ORAL DAILY
Qty: 30 TABLET | Refills: 2 | Status: SHIPPED | OUTPATIENT
Start: 2022-11-15 | End: 2022-12-13 | Stop reason: SDUPTHER

## 2022-11-15 NOTE — PATIENT INSTRUCTIONS
Check BP daily. If BP consistently greater than 130/80 call our office.     Avoid decongestants given elevated BP.    Try Mucinex and azelastine for congestion. If symptoms persist call us.

## 2022-11-15 NOTE — PROGRESS NOTES
"  Dereje Alvarez M.D.  Internal Medicine  [facility]  [address], [address]  [place] [address]  [phone]      Chief Complaint  Establish Care, Hypertension, Cough, and Nasal Congestion (Tested for covid 3 times all negative )    SUBJECTIVE    History of Present Illness    Bernadette Perez is a 66 y.o. female who presents to the office today as a new patient to establish care.     Hypertension-Has been elevated at the dentist and was told to go to the Doctor's. She thinks it was in the 160s/80s. She has a blood pressure cuff at home that she does not use. She was previously on antihypertensives but does not remember which. She feels her face flush when her BP is high.       Depression/anxiety-previously on Paxil. Feels she needs something for her nerves but does not want medicine    She had a stage 2 melanoma removed from her back 5 years ago. She follows with Dr. Gibbons.     Nasal congestion-since Wednesday. Clear nasal discharge. She has a headache. She had sick contacts at work. She tested negative for COVID 3 times. No fever, chills, no shortness of air. She gets allergy shots every 2 weeks. She is \"coughing her head off\". Her throat is sore from coughing.     She is due for mammogram and Dexa but prefers her GYN do this. She sees Dr. Victoria. She thinks it's been over 10 years since last colonoscopy.     Social-She works at [employer]    Review of Systems    Allergies   Allergen Reactions   • Celebrex [Celecoxib] Other (See Comments)     Causes liver problems   • Codeine    • Mobic [Meloxicam] Other (See Comments)     Effects liver   • Penicillins    • Contrast Dye Rash     Broke out in rash on arm        Outpatient Medications Marked as Taking for the 11/15/22 encounter (Office Visit) with Dereje Alvarez MD   Medication Sig Dispense Refill   • Azelastine HCl 137 MCG/SPRAY solution INSTILL 1 TO 2 SPRAYS INTO EACH NOSTRIL TWICE A DAY AS NEEDED     • fluticasone (FLONASE) 50 MCG/ACT nasal " "spray 1 spray into each nostril daily. Administer 2 sprays in each nostril for each dose.          Past Medical History:   Diagnosis Date   • Allergic     ALLERGY INJECTIONS - ALLERGY FIRST EVERY 3 WEEKS    • Arthritis    • Colon polyp    • H/O bone density study 2016    dr torres - [facility]   • H/O complete eye exam 06/2016     Past Surgical History:   Procedure Laterality Date   • COLONOSCOPY  due    polyps    • EYE SURGERY     • HAND SURGERY     • HEMORRHOIDECTOMY     • HYSTERECTOMY     • MAMMO BILATERAL  08/206    SCHEDULED - DR TORRES    • PAP SMEAR  8/2016    SCHEDULED DR TORRES / [facility]      Family History   Problem Relation Age of Onset   • Diabetes Mother    • COPD Mother    • Mental illness Father    • Heart disease Father    • Heart disease Other    • Hypertension Other    • Diabetes Other    • Cancer Other    • Thyroid disease Other    • Arthritis Other     reports that she has never smoked. She has never used smokeless tobacco. She reports current alcohol use. She reports that she does not use drugs.    OBJECTIVE    Vital Signs:   /88   Pulse 82   Temp 97.8 °F (36.6 °C)   Ht 160 cm (62.99\")   Wt 74.6 kg (164 lb 6.4 oz)   SpO2 98%   BMI 29.13 kg/m²     Physical Exam  Constitutional:       Appearance: Normal appearance.   Cardiovascular:      Rate and Rhythm: Normal rate and regular rhythm.      Heart sounds: Normal heart sounds. No murmur heard.  Pulmonary:      Effort: Pulmonary effort is normal.      Breath sounds: Normal breath sounds.   Abdominal:      General: Abdomen is flat. There is no distension.      Palpations: Abdomen is soft.      Tenderness: There is no abdominal tenderness.   Musculoskeletal:      Right lower leg: No edema.      Left lower leg: No edema.   Skin:     General: Skin is warm and dry.   Neurological:      Mental Status: She is alert.   Psychiatric:         Mood and Affect: Mood normal.         Behavior: Behavior normal.         Thought Content: Thought content " normal.            The following data was reviewed by: Dereje Alvarez MD on 11/15/2022:      Lab Results   Component Value Date    WBC 0-2 (A) 07/27/2016    HGB 13.7 07/12/2016    HCT 41.3 07/12/2016    MCV 88.4 07/12/2016     07/12/2016             Lab Results   Component Value Date    CHLPL 243 (H) 07/12/2016    TRIG 192 (H) 07/12/2016    HDL 58 07/12/2016     (H) 07/12/2016           Lab Results   Component Value Date    WBC 0-2 (A) 07/27/2016    HGB 13.7 07/12/2016    HCT 41.3 07/12/2016    MCV 88.4 07/12/2016     07/12/2016           Data reviewed: Previous PCP notes         CT CHEST WITH IV CONTRAST     HISTORY: Pulmonary     TECHNIQUE: Radiation dose reduction techniques were utilized, including  automated exposure control and exposure modulation based on body size.   3 mm images were obtained through the chest after the administration of  IV contrast.      COMPARISON: Chest CT 12/11/2017     FINDINGS:     Chest:         Thoracic inlet: Unremarkable     Heart and great vessels: Unremarkable     Lymphatics: Unremarkable     Lung parenchyma and pleural space: 0.8 cm noncalcified pulmonary nodule  in the medial right lower lobe is unchanged. There are no new suspicious  pulmonary nodules     Visualized upper abdomen: Small hiatal hernia.     IMPRESSION:  1.  Stable 0.8 cm pulmonary nodule in the right lower lobe. Follow-up CT  in 6-12 months is recommended to ensure stability per Fleischner  criteria.  2.  Small hiatal hernia.    ASSESSMENT & PLAN     Diagnoses and all orders for this visit:    1. Annual physical exam (Primary)  -     Comprehensive Metabolic Panel  -     CBC & Differential  -     Lipid Panel With LDL / HDL Ratio  -     Hemoglobin A1c    2. Encounter for screening colonoscopy  -     Ambulatory Referral For Screening Colonoscopy    3. Primary hypertension  Assessment & Plan:  Hypertension is newly identified.  Dietary sodium restriction.  Weight loss.  Regular aerobic  exercise.  Medication changes per orders.  Ambulatory blood pressure monitoring.  Blood pressure will be reassessed in 4 weeks.  Discussed patient to bring blood pressure log to next visit.    Orders:  -     losartan (Cozaar) 25 MG tablet; Take 1 tablet by mouth Daily.  Dispense: 30 tablet; Refill: 2    4. Acute cough  Comments:  Suspect due to post nasal drip from viral URI  Orders:  -     benzonatate (Tessalon Perles) 100 MG capsule; Take 1 capsule by mouth 3 (Three) Times a Day As Needed for Cough.  Dispense: 30 capsule; Refill: 0    Other orders  -     Pneumococcal Conjugate Vaccine 20-Valent (PCV20)    Discussed DASH diet for hypertension and cardiovascular health today.    Health Maintenance Due   Topic Date Due   • ZOSTER VACCINE (1 of 2) Never done   • ANNUAL WELLNESS VISIT  Never done   • LIPID PANEL  07/12/2017   • DXA SCAN  01/01/2018   • MAMMOGRAM  07/12/2018   • COVID-19 Vaccine (3 - Booster for Pfizer series) 11/10/2021        Follow Up  Return in about 4 weeks (around 12/13/2022) for Recheck.    Patient/family had no further questions at this time and verbalized understanding of the plan discussed today.

## 2022-11-15 NOTE — ASSESSMENT & PLAN NOTE
Hypertension is newly identified.  Dietary sodium restriction.  Weight loss.  Regular aerobic exercise.  Medication changes per orders.  Ambulatory blood pressure monitoring.  Blood pressure will be reassessed in 4 weeks.  Discussed patient to bring blood pressure log to next visit.

## 2022-11-16 LAB
ALBUMIN SERPL-MCNC: 5.2 G/DL (ref 3.5–5.2)
ALBUMIN/GLOB SERPL: 2.5 G/DL
ALP SERPL-CCNC: 107 U/L (ref 39–117)
ALT SERPL-CCNC: 20 U/L (ref 1–33)
AST SERPL-CCNC: 20 U/L (ref 1–32)
BASOPHILS # BLD AUTO: 0.06 10*3/MM3 (ref 0–0.2)
BASOPHILS NFR BLD AUTO: 0.8 % (ref 0–1.5)
BILIRUB SERPL-MCNC: 0.8 MG/DL (ref 0–1.2)
BUN SERPL-MCNC: 10 MG/DL (ref 8–23)
BUN/CREAT SERPL: 11.1 (ref 7–25)
CALCIUM SERPL-MCNC: 10.2 MG/DL (ref 8.6–10.5)
CHLORIDE SERPL-SCNC: 99 MMOL/L (ref 98–107)
CHOLEST SERPL-MCNC: 254 MG/DL (ref 0–200)
CO2 SERPL-SCNC: 29.3 MMOL/L (ref 22–29)
CREAT SERPL-MCNC: 0.9 MG/DL (ref 0.57–1)
EGFRCR SERPLBLD CKD-EPI 2021: 71.1 ML/MIN/1.73
EOSINOPHIL # BLD AUTO: 0.36 10*3/MM3 (ref 0–0.4)
EOSINOPHIL NFR BLD AUTO: 5 % (ref 0.3–6.2)
ERYTHROCYTE [DISTWIDTH] IN BLOOD BY AUTOMATED COUNT: 13.3 % (ref 12.3–15.4)
GLOBULIN SER CALC-MCNC: 2.1 GM/DL
GLUCOSE SERPL-MCNC: 98 MG/DL (ref 65–99)
HBA1C MFR BLD: 5.3 % (ref 4.8–5.6)
HCT VFR BLD AUTO: 41.3 % (ref 34–46.6)
HDLC SERPL-MCNC: 54 MG/DL (ref 40–60)
HGB BLD-MCNC: 14 G/DL (ref 12–15.9)
IMM GRANULOCYTES # BLD AUTO: 0.02 10*3/MM3 (ref 0–0.05)
IMM GRANULOCYTES NFR BLD AUTO: 0.3 % (ref 0–0.5)
LDLC SERPL CALC-MCNC: 167 MG/DL (ref 0–100)
LDLC/HDLC SERPL: 3.04 {RATIO}
LYMPHOCYTES # BLD AUTO: 1.29 10*3/MM3 (ref 0.7–3.1)
LYMPHOCYTES NFR BLD AUTO: 17.8 % (ref 19.6–45.3)
MCH RBC QN AUTO: 29.2 PG (ref 26.6–33)
MCHC RBC AUTO-ENTMCNC: 33.9 G/DL (ref 31.5–35.7)
MCV RBC AUTO: 86.2 FL (ref 79–97)
MONOCYTES # BLD AUTO: 0.52 10*3/MM3 (ref 0.1–0.9)
MONOCYTES NFR BLD AUTO: 7.2 % (ref 5–12)
NEUTROPHILS # BLD AUTO: 4.98 10*3/MM3 (ref 1.7–7)
NEUTROPHILS NFR BLD AUTO: 68.9 % (ref 42.7–76)
NRBC BLD AUTO-RTO: 0.1 /100 WBC (ref 0–0.2)
PLATELET # BLD AUTO: 354 10*3/MM3 (ref 140–450)
POTASSIUM SERPL-SCNC: 4.3 MMOL/L (ref 3.5–5.2)
PROT SERPL-MCNC: 7.3 G/DL (ref 6–8.5)
RBC # BLD AUTO: 4.79 10*6/MM3 (ref 3.77–5.28)
SODIUM SERPL-SCNC: 140 MMOL/L (ref 136–145)
TRIGL SERPL-MCNC: 178 MG/DL (ref 0–150)
VLDLC SERPL CALC-MCNC: 33 MG/DL (ref 5–40)
WBC # BLD AUTO: 7.23 10*3/MM3 (ref 3.4–10.8)

## 2022-11-16 RX ORDER — ATORVASTATIN CALCIUM 20 MG/1
20 TABLET, FILM COATED ORAL DAILY
Qty: 90 TABLET | Refills: 2 | Status: SHIPPED | OUTPATIENT
Start: 2022-11-16

## 2022-12-13 ENCOUNTER — OFFICE VISIT (OUTPATIENT)
Dept: INTERNAL MEDICINE | Facility: CLINIC | Age: 67
End: 2022-12-13

## 2022-12-13 VITALS
OXYGEN SATURATION: 98 % | SYSTOLIC BLOOD PRESSURE: 141 MMHG | TEMPERATURE: 98.2 F | DIASTOLIC BLOOD PRESSURE: 90 MMHG | WEIGHT: 164 LBS | HEART RATE: 78 BPM | BODY MASS INDEX: 29.06 KG/M2 | HEIGHT: 63 IN

## 2022-12-13 DIAGNOSIS — I10 PRIMARY HYPERTENSION: Primary | ICD-10-CM

## 2022-12-13 DIAGNOSIS — R91.1 PULMONARY NODULE: ICD-10-CM

## 2022-12-13 PROCEDURE — 99213 OFFICE O/P EST LOW 20 MIN: CPT | Performed by: STUDENT IN AN ORGANIZED HEALTH CARE EDUCATION/TRAINING PROGRAM

## 2022-12-13 RX ORDER — LOSARTAN POTASSIUM 50 MG/1
50 TABLET ORAL DAILY
Qty: 30 TABLET | Refills: 2 | Status: SHIPPED | OUTPATIENT
Start: 2022-12-13 | End: 2023-02-23 | Stop reason: SDUPTHER

## 2022-12-13 NOTE — PROGRESS NOTES
Dereje Alvarez M.D.  Internal Medicine  [facility]  [address], [address]  [place] [address]  [phone]      Chief Complaint  Follow-up (F/U on HTN and labs )    SUBJECTIVE    History of Present Illness    Bernadette Perez is a 66 y.o. female who presents to the office today as an established patient that last saw me on 11/15/2022.     At last appointment her blood pressure was elevated and she was initiated on losartan 25 mg daily. Makes stomach upset if taking on empty stomach. BP ranges 100s-150s [id number] diastolic.     She started atorvastatin for HLD. She denies muscle soreness.    She has paperworks for colonoscopy.     She also had a cough thought to be secondary to postnasal drip from a viral upper respiratory infection. Cough is gone.     On chart review she had a CT of her chest in 2018 that had a 0.8 cm pulmonary nodule with 6 to 12-month follow-up recommended.    Review of Systems    Allergies   Allergen Reactions   • Celebrex [Celecoxib] Other (See Comments)     Causes liver problems   • Codeine    • Mobic [Meloxicam] Other (See Comments)     Effects liver   • Penicillins    • Contrast Dye Rash     Broke out in rash on arm        Outpatient Medications Marked as Taking for the 12/13/22 encounter (Office Visit) with Dereje Alvarez MD   Medication Sig Dispense Refill   • atorvastatin (LIPITOR) 20 MG tablet Take 1 tablet by mouth Daily. 90 tablet 2   • Azelastine HCl 137 MCG/SPRAY solution INSTILL 1 TO 2 SPRAYS INTO EACH NOSTRIL TWICE A DAY AS NEEDED     • fluticasone (FLONASE) 50 MCG/ACT nasal spray 1 spray into each nostril daily. Administer 2 sprays in each nostril for each dose.     • losartan (Cozaar) 50 MG tablet Take 1 tablet by mouth Daily. 30 tablet 2   • [DISCONTINUED] losartan (Cozaar) 25 MG tablet Take 1 tablet by mouth Daily. 30 tablet 2        Past Medical History:   Diagnosis Date   • Allergic     ALLERGY INJECTIONS - ALLERGY FIRST EVERY 3 WEEKS    •  "Arthritis    • Colon polyp    • H/O bone density study 2016    [facility] torres - [facility]   • H/O complete eye exam 06/2016     Past Surgical History:   Procedure Laterality Date   • COLONOSCOPY  due    polyps    • EYE SURGERY     • HAND SURGERY     • HEMORRHOIDECTOMY     • HYSTERECTOMY     • MAMMO BILATERAL  08/206    SCHEDULED - DR TORRES    • PAP SMEAR  8/2016    SCHEDULED DR TORRES / [facility]ERRIE GONZALEZ      Family History   Problem Relation Age of Onset   • Diabetes Mother    • COPD Mother    • Mental illness Father    • Heart disease Father    • Heart disease Other    • Hypertension Other    • Diabetes Other    • Cancer Other    • Thyroid disease Other    • Arthritis Other     reports that she has never smoked. She has never used smokeless tobacco. She reports current alcohol use. She reports that she does not use drugs.    OBJECTIVE    Vital Signs:   /90   Pulse 78   Temp 98.2 °F (36.8 °C)   Ht 160 cm (62.99\")   Wt 74.4 kg (164 lb)   SpO2 98%   BMI 29.06 kg/m²     Physical Exam  Constitutional:       Appearance: Normal appearance.   Cardiovascular:      Rate and Rhythm: Normal rate and regular rhythm.      Heart sounds: Normal heart sounds. No murmur heard.  Pulmonary:      Effort: Pulmonary effort is normal.      Breath sounds: Normal breath sounds.   Skin:     General: Skin is warm and dry.   Neurological:      Mental Status: She is alert.   Psychiatric:         Mood and Affect: Mood normal.         Behavior: Behavior normal.         Thought Content: Thought content normal.      Right clavicle sits higher than left.      The following data was reviewed by: Dereje Alvarez MD on 12/13/2022:  CMP    CMP 11/15/22   Glucose 98   BUN 10   Creatinine 0.90   Sodium 140   Potassium 4.3   Chloride 99   Calcium 10.2   Total Protein 7.3   Albumin 5.20   Globulin 2.1   Total Bilirubin 0.8   Alkaline Phosphatase 107   AST (SGOT) 20   ALT (SGPT) 20           CBC w/diff    CBC w/Diff 11/15/22   WBC 7.23   RBC 4.79   Hemoglobin " 14.0   Hematocrit 41.3   MCV 86.2   MCH 29.2   MCHC 33.9   RDW 13.3   Platelets 354   Neutrophil Rel % 68.9   Lymphocyte Rel % 17.8 (A)   Monocyte Rel % 7.2   Eosinophil Rel % 5.0   Basophil Rel % 0.8   (A) Abnormal value            Lipid Panel    Lipid Panel 11/15/22   Total Cholesterol 254 (A)   Triglycerides 178 (A)   HDL Cholesterol 54   VLDL Cholesterol 33   LDL Cholesterol  167 (A)   LDL/HDL Ratio 3.04   (A) Abnormal value       Comments are available for some flowsheets but are not being displayed.               Most Recent A1C    HGBA1C Most Recent 11/15/22   Hemoglobin A1C 5.30      Comments are available for some flowsheets but are not being displayed.           Data reviewed: Note from last appointment.         CT CHEST WITH IV CONTRAST     HISTORY: Pulmonary     TECHNIQUE: Radiation dose reduction techniques were utilized, including  automated exposure control and exposure modulation based on body size.   3 mm images were obtained through the chest after the administration of  IV contrast.      COMPARISON: Chest CT 12/11/2017     FINDINGS:     Chest:         Thoracic inlet: Unremarkable     Heart and great vessels: Unremarkable     Lymphatics: Unremarkable     Lung parenchyma and pleural space: 0.8 cm noncalcified pulmonary nodule  in the medial right lower lobe is unchanged. There are no new suspicious  pulmonary nodules     Visualized upper abdomen: Small hiatal hernia.     IMPRESSION:  1.  Stable 0.8 cm pulmonary nodule in the right lower lobe. Follow-up CT  in 6-12 months is recommended to ensure stability per Fleischner  criteria.  2.  Small hiatal hernia.    The 10-year ASCVD risk score (Tomy STONE, et al., 2019) is: 10.2%    Values used to calculate the score:      Age: 66 years      Sex: Female      Is Non- : No      Diabetic: No      Tobacco smoker: No      Systolic Blood Pressure: 141 mmHg      Is BP treated: Yes      HDL Cholesterol: 54 mg/dL      Total Cholesterol: 254  mg/dL      ASSESSMENT & PLAN     Diagnoses and all orders for this visit:    1. Primary hypertension (Primary)  -     losartan (Cozaar) 50 MG tablet; Take 1 tablet by mouth Daily.  Dispense: 30 tablet; Refill: 2    2. Pulmonary nodule  -     CT Chest Without Contrast; Future      BP has wide range at home. Patient asymptomatic. BP elevated on my recheck. Will increase losartan. Pt to call if BP out of range at home.    Ordering CT for pulmonary nodule follow up. Discussed clavicle pain/asymmetry likely arthritis but this will be included in CT as well.    Health Maintenance Due   Topic Date Due   • ZOSTER VACCINE (1 of 2) Never done   • ANNUAL WELLNESS VISIT  Never done   • DXA SCAN  01/01/2018   • MAMMOGRAM  07/12/2018   • COVID-19 Vaccine (3 - Booster for Pfizer series) 11/10/2021        Follow Up  Return in about 3 months (around 3/13/2023) for Medicare Wellness.    Patient/family had no further questions at this time and verbalized understanding of the plan discussed today.

## 2023-01-26 ENCOUNTER — HOSPITAL ENCOUNTER (OUTPATIENT)
Dept: CT IMAGING | Facility: HOSPITAL | Age: 68
Discharge: HOME | End: 2023-01-26
Admitting: STUDENT IN AN ORGANIZED HEALTH CARE EDUCATION/TRAINING PROGRAM
Payer: MEDICARE

## 2023-01-26 DIAGNOSIS — R91.1 PULMONARY NODULE: ICD-10-CM

## 2023-01-26 PROCEDURE — 71250 CT THORAX DX C-: CPT

## 2023-01-31 ENCOUNTER — TELEPHONE (OUTPATIENT)
Dept: INTERNAL MEDICINE | Facility: CLINIC | Age: 68
End: 2023-01-31
Payer: MEDICARE

## 2023-01-31 NOTE — TELEPHONE ENCOUNTER
Caller: LÁZARO- REGISTERED NURSE    Relationship:     Best call back number: [phone]    What was the call regarding: LÁZARO, REGISTERED NURSE WITH [facility], IS CALLING IN REGARDS TO PATIENT'S CT SCAN FROM 01/26/2023. LÁZARO STATED THAT SHE HAS ROUTED THE RESULTS TO DR MERCHANT AND THEY WILL BE HAPPY TO SEE PATIENT IN THEIR MULTI-DISCIPLINARY CLINIC IF DR MERCHANT CHOOSES.

## 2023-01-31 NOTE — TELEPHONE ENCOUNTER
I called Bernadette Perez at [phone] at 15:42 EST     There was no answer and voicemail did not leave identifying information.  I left our callback number.  I will plan to call her back tomorrow about CT scan results.

## 2023-02-01 DIAGNOSIS — R91.1 LUNG NODULE SEEN ON IMAGING STUDY: Primary | ICD-10-CM

## 2023-02-01 NOTE — TELEPHONE ENCOUNTER
Patient is returning your call. She said that she is at work from 8:00 am-4:00 pm and she is not allowed to be on the phone. She said you an leave her a voice mail about the CT results.

## 2023-02-01 NOTE — TELEPHONE ENCOUNTER
I called and talked to patient and let her know CT scan results.  She did note that she has a history of melanoma about 5 years ago and she is concerned that it could have spread to the lung.  I told this nodule appears to be slow-growing but appears to be possible neoplasm so it is best to follow-up with the PET scan and multidisciplinary lung clinic for further evaluation.

## 2023-02-13 ENCOUNTER — HOSPITAL ENCOUNTER (OUTPATIENT)
Dept: PET IMAGING | Facility: HOSPITAL | Age: 68
Discharge: HOME | End: 2023-02-13
Payer: MEDICARE

## 2023-02-13 DIAGNOSIS — R91.1 LUNG NODULE SEEN ON IMAGING STUDY: ICD-10-CM

## 2023-02-13 LAB — GLUCOSE BLDC GLUCOMTR-MCNC: 95 MG/DL (ref 70–130)

## 2023-02-13 PROCEDURE — 82962 GLUCOSE BLOOD TEST: CPT

## 2023-02-13 PROCEDURE — A9552 F18 FDG: HCPCS | Performed by: STUDENT IN AN ORGANIZED HEALTH CARE EDUCATION/TRAINING PROGRAM

## 2023-02-13 PROCEDURE — 78815 PET IMAGE W/CT SKULL-THIGH: CPT

## 2023-02-13 PROCEDURE — 0 FLUDEOXYGLUCOSE F18 SOLUTION: Performed by: STUDENT IN AN ORGANIZED HEALTH CARE EDUCATION/TRAINING PROGRAM

## 2023-02-13 RX ADMIN — FLUDEOXYGLUCOSE F18 1 DOSE: 300 INJECTION INTRAVENOUS at 06:50

## 2023-02-14 ENCOUNTER — TELEPHONE (OUTPATIENT)
Dept: INTERNAL MEDICINE | Facility: CLINIC | Age: 68
End: 2023-02-14
Payer: MEDICARE

## 2023-02-14 NOTE — TELEPHONE ENCOUNTER
I called Bernadette PRESCOTT Chris at [phone] at 17:10 EST     I called patient with imaging results.  Patient did not  so I left a voicemail.  Her nuclear medicine PET scan yesterday showed 1.3 cm lower lobe pulmonary nodule that did not appear to be hypermetabolic however given rate of growth this is still suspicious.  Patient has upcoming appointments with the thoracic surgeon in February, radiation oncology and medical oncology.  I left our number in case patient has any questions.

## 2023-02-21 ENCOUNTER — OFFICE VISIT (OUTPATIENT)
Dept: OTHER | Facility: HOSPITAL | Age: 68
End: 2023-02-21
Payer: MEDICARE

## 2023-02-21 ENCOUNTER — PREP FOR SURGERY (OUTPATIENT)
Dept: OTHER | Facility: HOSPITAL | Age: 68
End: 2023-02-21
Payer: MEDICARE

## 2023-02-21 VITALS
SYSTOLIC BLOOD PRESSURE: 163 MMHG | DIASTOLIC BLOOD PRESSURE: 82 MMHG | TEMPERATURE: 97.8 F | WEIGHT: 161.5 LBS | RESPIRATION RATE: 16 BRPM | HEIGHT: 63 IN | BODY MASS INDEX: 28.62 KG/M2 | OXYGEN SATURATION: 96 % | HEART RATE: 72 BPM

## 2023-02-21 DIAGNOSIS — R91.1 SOLITARY PULMONARY NODULE: Primary | ICD-10-CM

## 2023-02-21 DIAGNOSIS — R91.1 PULMONARY NODULE: Primary | ICD-10-CM

## 2023-02-21 PROCEDURE — G0463 HOSPITAL OUTPT CLINIC VISIT: HCPCS

## 2023-02-21 PROCEDURE — 99204 OFFICE O/P NEW MOD 45 MIN: CPT | Performed by: STUDENT IN AN ORGANIZED HEALTH CARE EDUCATION/TRAINING PROGRAM

## 2023-02-21 NOTE — PATIENT INSTRUCTIONS
Met patient in lung clinic 2/21/23. She met with Dr. Epperson and is scheduled for PFTs today @ [facility] and CT guided Biopsy 3/2/23. Explained pertinent information regarding preparation for and location of tests. Patient able to repeat back and verbalize understanding. No additional needs noted.  Pt will be seen for follow up appointment in lung clinic 3/7/23. Sondra Zayas RN, BSN, OCN, CCM, Lung navigator

## 2023-02-21 NOTE — PROGRESS NOTES
"Chief Complaint    Subjective        Bernadette Perez presents to [facility] M[facility]DISCIPLINARY CLINIC  History of Present Illness  Ms. Perez is a pleasant 67-year-old lady who presents to thoracic multidisciplinary clinic for an enlarging right lower lobe pulmonary nodule.  This nodule has increased in size since 2017 when it was initially seen as 0.7 cm in size.  On her most recent imaging on 2/13/2023 the size is 1.3 cm.  On PET scan, the nodule is photopenic and not hypermetabolic.  She is a never smoker.  She does have a history of melanoma in the back of the was excised several years ago.  She denies any other chest operations.  She denies any symptoms.  She denies any shortness of breath, fever and/or chills.  She denies cough.  She denies mopped assist or hematemesis.  She denies any unintentional weight loss.    Objective   Vital Signs:  /82   Pulse 72   Temp 97.8 °F (36.6 °C)   Resp 16   Ht 160 cm (63\")   Wt 73.3 kg (161 lb 8 oz)   SpO2 96%   BMI 28.61 kg/m²   Estimated body mass index is 28.61 kg/m² as calculated from the following:    Height as of this encounter: 160 cm (63\").    Weight as of this encounter: 73.3 kg (161 lb 8 oz).           Physical Exam  Vitals reviewed.   Constitutional:       Appearance: Normal appearance.   HENT:      Head: Normocephalic.      Mouth/Throat:      Mouth: Mucous membranes are moist.   Cardiovascular:      Rate and Rhythm: Normal rate and regular rhythm.      Pulses: Normal pulses.      Heart sounds: Normal heart sounds.   Pulmonary:      Effort: Pulmonary effort is normal.      Breath sounds: Normal breath sounds.   Musculoskeletal:         General: Normal range of motion.      Cervical back: Normal range of motion.   Skin:     General: Skin is warm.   Neurological:      General: No focal deficit present.      Mental Status: She is alert and oriented to person, place, and time.   Psychiatric:         Mood and Affect: Mood normal.         " Behavior: Behavior normal.         Thought Content: Thought content normal.         Judgment: Judgment normal.        Result Review :  PET scan from 2/13/2023 was visualized by myself.  There is a photopenic 1.3 cm pulmonary nodule in the right lower lobe, this has grown in size.           Assessment and Plan   Diagnoses and all orders for this visit:    1. Solitary pulmonary nodule (Primary)  -     Full Pulmonary Function Test With Bronchodilator; Future    Ms. Perez is a pleasant 67-year-old lady who presents today to thoracic oncology multidisciplinary clinic.  She is here for an enlarging right lower lobe pulmonary nodule.  She is a never smoker.  Although this nodule is photopenic on CT/PET scan, it has almost doubled in size in the last 5 to 6 years.  Due to its slow-growing growth we will try to obtain a CT-guided biopsy of this lesion in interventional radiology.  Also, we will obtain a set of full pulmonary function test with DLCO in anticipation for possible surgical resection.       I spent 45 minutes caring for Bernadette on this date of service. This time includes time spent by me in the following activities:preparing for the visit, reviewing tests, obtaining and/or reviewing a separately obtained history, performing a medically appropriate examination and/or evaluation , counseling and educating the patient/family/caregiver, ordering medications, tests, or procedures, referring and communicating with other health care professionals , documenting information in the medical record, independently interpreting results and communicating that information with the patient/family/caregiver and care coordination  Follow Up   No follow-ups on file.  Patient was given instructions and counseling regarding her condition or for health maintenance advice. Please see specific information pulled into the AVS if appropriate.

## 2023-02-23 DIAGNOSIS — I10 PRIMARY HYPERTENSION: ICD-10-CM

## 2023-02-23 RX ORDER — LOSARTAN POTASSIUM 50 MG/1
50 TABLET ORAL DAILY
Qty: 30 TABLET | Refills: 2 | Status: SHIPPED | OUTPATIENT
Start: 2023-02-23 | End: 2023-04-04 | Stop reason: SDUPTHER

## 2023-02-23 NOTE — TELEPHONE ENCOUNTER
Caller: Chris Bernadette KENYON    Relationship: Self    Best call back number:[phone]    Requested Prescriptions:   Requested Prescriptions     Pending Prescriptions Disp Refills   • losartan (Cozaar) 50 MG tablet 30 tablet 2     Sig: Take 1 tablet by mouth Daily.        Pharmacy where request should be sent: [facility]/PHARMACY #[id number] [place], [place] - [address] . - [phone]  - [phone]     Additional details provided by patient: PATIENT HAS ABOUT A WEEK AND A HALF SUPPLY LEFT OF MEDICATION     Does the patient have less than a 3 day supply:  [] Yes  [x] No    Would you like a call back once the refill request has been completed: [] Yes [x] No    If the office needs to give you a call back, can they leave a voicemail: [] Yes [x] No    Libby Walden Rep   02/23/23 08:27 EST

## 2023-03-02 ENCOUNTER — HOSPITAL ENCOUNTER (OUTPATIENT)
Dept: GENERAL RADIOLOGY | Facility: HOSPITAL | Age: 68
Discharge: HOME | End: 2023-03-02
Payer: MEDICARE

## 2023-03-02 ENCOUNTER — HOSPITAL ENCOUNTER (OUTPATIENT)
Dept: CT IMAGING | Facility: HOSPITAL | Age: 68
Discharge: HOME | End: 2023-03-02
Payer: MEDICARE

## 2023-03-02 VITALS
SYSTOLIC BLOOD PRESSURE: 145 MMHG | WEIGHT: 160 LBS | HEIGHT: 63 IN | OXYGEN SATURATION: 99 % | BODY MASS INDEX: 28.35 KG/M2 | RESPIRATION RATE: 14 BRPM | TEMPERATURE: 98 F | HEART RATE: 66 BPM | DIASTOLIC BLOOD PRESSURE: 66 MMHG

## 2023-03-02 DIAGNOSIS — R91.1 PULMONARY NODULE: ICD-10-CM

## 2023-03-02 LAB
BASOPHILS # BLD AUTO: 0.05 10*3/MM3 (ref 0–0.2)
BASOPHILS NFR BLD AUTO: 1 % (ref 0–1.5)
DEPRECATED RDW RBC AUTO: 39.1 FL (ref 37–54)
EOSINOPHIL # BLD AUTO: 0.17 10*3/MM3 (ref 0–0.4)
EOSINOPHIL NFR BLD AUTO: 3.4 % (ref 0.3–6.2)
ERYTHROCYTE [DISTWIDTH] IN BLOOD BY AUTOMATED COUNT: 12.9 % (ref 12.3–15.4)
HCT VFR BLD AUTO: 35.5 % (ref 34–46.6)
HGB BLD-MCNC: 12.3 G/DL (ref 12–15.9)
IMM GRANULOCYTES # BLD AUTO: 0.02 10*3/MM3 (ref 0–0.05)
IMM GRANULOCYTES NFR BLD AUTO: 0.4 % (ref 0–0.5)
INR PPP: 1.1 (ref 0.8–1.2)
LYMPHOCYTES # BLD AUTO: 1.51 10*3/MM3 (ref 0.7–3.1)
LYMPHOCYTES NFR BLD AUTO: 30.6 % (ref 19.6–45.3)
MCH RBC QN AUTO: 29 PG (ref 26.6–33)
MCHC RBC AUTO-ENTMCNC: 34.6 G/DL (ref 31.5–35.7)
MCV RBC AUTO: 83.7 FL (ref 79–97)
MONOCYTES # BLD AUTO: 0.31 10*3/MM3 (ref 0.1–0.9)
MONOCYTES NFR BLD AUTO: 6.3 % (ref 5–12)
NEUTROPHILS NFR BLD AUTO: 2.87 10*3/MM3 (ref 1.7–7)
NEUTROPHILS NFR BLD AUTO: 58.3 % (ref 42.7–76)
NRBC BLD AUTO-RTO: 0 /100 WBC (ref 0–0.2)
PLATELET # BLD AUTO: 333 10*3/MM3 (ref 140–450)
PMV BLD AUTO: 9.6 FL (ref 6–12)
PROTHROMBIN TIME: 13.2 SECONDS (ref 12.8–15.2)
RBC # BLD AUTO: 4.24 10*6/MM3 (ref 3.77–5.28)
WBC NRBC COR # BLD: 4.93 10*3/MM3 (ref 3.4–10.8)

## 2023-03-02 PROCEDURE — 25010000002 FENTANYL CITRATE (PF) 50 MCG/ML SOLUTION: Performed by: RADIOLOGY

## 2023-03-02 PROCEDURE — 0 LIDOCAINE 1 % SOLUTION: Performed by: RADIOLOGY

## 2023-03-02 PROCEDURE — 85025 COMPLETE CBC W/AUTO DIFF WBC: CPT | Performed by: RADIOLOGY

## 2023-03-02 PROCEDURE — 25010000002 MIDAZOLAM PER 1 MG: Performed by: RADIOLOGY

## 2023-03-02 PROCEDURE — 99152 MOD SED SAME PHYS/QHP 5/>YRS: CPT

## 2023-03-02 PROCEDURE — 25010000002 MORPHINE PER 10 MG: Performed by: RADIOLOGY

## 2023-03-02 PROCEDURE — 71045 X-RAY EXAM CHEST 1 VIEW: CPT

## 2023-03-02 PROCEDURE — 88305 TISSUE EXAM BY PATHOLOGIST: CPT | Performed by: STUDENT IN AN ORGANIZED HEALTH CARE EDUCATION/TRAINING PROGRAM

## 2023-03-02 PROCEDURE — 99153 MOD SED SAME PHYS/QHP EA: CPT

## 2023-03-02 PROCEDURE — 85610 PROTHROMBIN TIME: CPT

## 2023-03-02 RX ORDER — SODIUM CHLORIDE 9 MG/ML
INJECTION, SOLUTION INTRAVENOUS CONTINUOUS PRN
Status: COMPLETED | OUTPATIENT
Start: 2023-03-02 | End: 2023-03-02

## 2023-03-02 RX ORDER — MORPHINE SULFATE 2 MG/ML
2 INJECTION, SOLUTION INTRAMUSCULAR; INTRAVENOUS ONCE
Status: COMPLETED | OUTPATIENT
Start: 2023-03-02 | End: 2023-03-02

## 2023-03-02 RX ORDER — SODIUM CHLORIDE 0.9 % (FLUSH) 0.9 %
10 SYRINGE (ML) INJECTION AS NEEDED
Status: DISCONTINUED | OUTPATIENT
Start: 2023-03-02 | End: 2023-03-03 | Stop reason: HOSPADM

## 2023-03-02 RX ORDER — SODIUM CHLORIDE 0.9 % (FLUSH) 0.9 %
3 SYRINGE (ML) INJECTION EVERY 12 HOURS SCHEDULED
Status: DISCONTINUED | OUTPATIENT
Start: 2023-03-02 | End: 2023-03-03 | Stop reason: HOSPADM

## 2023-03-02 RX ORDER — SODIUM CHLORIDE 9 MG/ML
40 INJECTION, SOLUTION INTRAVENOUS AS NEEDED
Status: DISCONTINUED | OUTPATIENT
Start: 2023-03-02 | End: 2023-03-03 | Stop reason: HOSPADM

## 2023-03-02 RX ORDER — MIDAZOLAM HYDROCHLORIDE 1 MG/ML
INJECTION INTRAMUSCULAR; INTRAVENOUS AS NEEDED
Status: COMPLETED | OUTPATIENT
Start: 2023-03-02 | End: 2023-03-02

## 2023-03-02 RX ORDER — FENTANYL CITRATE 50 UG/ML
INJECTION, SOLUTION INTRAMUSCULAR; INTRAVENOUS AS NEEDED
Status: COMPLETED | OUTPATIENT
Start: 2023-03-02 | End: 2023-03-02

## 2023-03-02 RX ORDER — LIDOCAINE HYDROCHLORIDE 10 MG/ML
20 INJECTION, SOLUTION INFILTRATION; PERINEURAL ONCE
Status: COMPLETED | OUTPATIENT
Start: 2023-03-02 | End: 2023-03-02

## 2023-03-02 RX ORDER — SODIUM CHLORIDE 9 MG/ML
25 INJECTION, SOLUTION INTRAVENOUS ONCE
Status: DISCONTINUED | OUTPATIENT
Start: 2023-03-02 | End: 2023-03-03 | Stop reason: HOSPADM

## 2023-03-02 RX ADMIN — FENTANYL CITRATE 25 MCG: 50 INJECTION, SOLUTION INTRAMUSCULAR; INTRAVENOUS at 10:09

## 2023-03-02 RX ADMIN — FENTANYL CITRATE 25 MCG: 50 INJECTION, SOLUTION INTRAMUSCULAR; INTRAVENOUS at 10:08

## 2023-03-02 RX ADMIN — LIDOCAINE HYDROCHLORIDE 20 ML: 10 INJECTION, SOLUTION INFILTRATION; PERINEURAL at 10:09

## 2023-03-02 RX ADMIN — MIDAZOLAM 1 MG: 1 INJECTION INTRAMUSCULAR; INTRAVENOUS at 10:08

## 2023-03-02 RX ADMIN — MORPHINE SULFATE 2 MG: 2 INJECTION, SOLUTION INTRAMUSCULAR; INTRAVENOUS at 11:09

## 2023-03-02 RX ADMIN — SODIUM CHLORIDE 25 ML: 9 INJECTION, SOLUTION INTRAVENOUS at 09:50

## 2023-03-02 NOTE — NURSING NOTE
Pt. Arrived to Radiology Triage for CT Guided Right Lung Biopsy.  Victor 4.      Protective goggles and mask in place with all patient interactions today.

## 2023-03-02 NOTE — NURSING NOTE
Pt tolerated procedure well with no c/o.  Post instructions reviewed with all questions and concerns addressed to pt's satisfaction.  Transported pt to discharge area, via w/c, where friend, Rosita, was waiting to take pt home.

## 2023-03-02 NOTE — NURSING NOTE
Dr Shi at bedside talking with patient about her pain level and chest x-ray. He reports that it looked fine. Her pain is controlled at a 2/10 now. She reports she has pain with inspiration. VSS.

## 2023-03-02 NOTE — DISCHARGE INSTRUCTIONS

## 2023-03-03 ENCOUNTER — TELEPHONE (OUTPATIENT)
Dept: INTERVENTIONAL RADIOLOGY/VASCULAR | Facility: HOSPITAL | Age: 68
End: 2023-03-03
Payer: MEDICARE

## 2023-03-03 LAB
LAB AP CASE REPORT: NORMAL
LAB AP CLINICAL INFORMATION: NORMAL
PATH REPORT.FINAL DX SPEC: NORMAL
PATH REPORT.GROSS SPEC: NORMAL

## 2023-03-07 ENCOUNTER — OFFICE VISIT (OUTPATIENT)
Dept: OTHER | Facility: HOSPITAL | Age: 68
End: 2023-03-07
Payer: MEDICARE

## 2023-03-07 VITALS
OXYGEN SATURATION: 98 % | RESPIRATION RATE: 16 BRPM | DIASTOLIC BLOOD PRESSURE: 81 MMHG | HEIGHT: 63 IN | SYSTOLIC BLOOD PRESSURE: 151 MMHG | HEART RATE: 69 BPM | WEIGHT: 159.8 LBS | TEMPERATURE: 97.5 F | BODY MASS INDEX: 28.31 KG/M2

## 2023-03-07 DIAGNOSIS — R91.1 SOLITARY PULMONARY NODULE: Primary | ICD-10-CM

## 2023-03-07 PROCEDURE — 99214 OFFICE O/P EST MOD 30 MIN: CPT | Performed by: STUDENT IN AN ORGANIZED HEALTH CARE EDUCATION/TRAINING PROGRAM

## 2023-03-07 PROCEDURE — G0463 HOSPITAL OUTPT CLINIC VISIT: HCPCS

## 2023-03-07 NOTE — PATIENT INSTRUCTIONS
Met with patient in lung clinic 3/7/23. The patient met with Dr. Epperson. CT chest scheduled 6/12/23 with follow up visit with Dr. Epperson in his office 6/14/23. Patient able to repeat back and verbalize understanding. All questions answered. No additional needs noted.  Sondra Zayas RN, BSN, OCN, CCM, Lung navigator

## 2023-04-04 ENCOUNTER — TELEPHONE (OUTPATIENT)
Dept: INTERNAL MEDICINE | Facility: CLINIC | Age: 68
End: 2023-04-04

## 2023-04-04 ENCOUNTER — OFFICE VISIT (OUTPATIENT)
Dept: INTERNAL MEDICINE | Facility: CLINIC | Age: 68
End: 2023-04-04
Payer: MEDICARE

## 2023-04-04 VITALS
HEIGHT: 63 IN | OXYGEN SATURATION: 98 % | SYSTOLIC BLOOD PRESSURE: 134 MMHG | DIASTOLIC BLOOD PRESSURE: 76 MMHG | HEART RATE: 77 BPM | WEIGHT: 151 LBS | BODY MASS INDEX: 26.75 KG/M2

## 2023-04-04 DIAGNOSIS — I10 PRIMARY HYPERTENSION: ICD-10-CM

## 2023-04-04 DIAGNOSIS — R21 RASH: Primary | ICD-10-CM

## 2023-04-04 RX ORDER — FLUOROMETHOLONE ACETATE 1 MG/ML
1 SUSPENSION/ DROPS OPHTHALMIC EVERY 24 HOURS
COMMUNITY
Start: 2022-11-16

## 2023-04-04 RX ORDER — LOSARTAN POTASSIUM 50 MG/1
50 TABLET ORAL DAILY
Qty: 30 TABLET | Refills: 2 | Status: SHIPPED | OUTPATIENT
Start: 2023-04-04

## 2023-04-04 RX ORDER — PREDNISONE 20 MG/1
40 TABLET ORAL DAILY
Qty: 10 TABLET | Refills: 0 | Status: SHIPPED | OUTPATIENT
Start: 2023-04-04 | End: 2023-04-09

## 2023-04-04 RX ORDER — TRIAMCINOLONE ACETONIDE 1 MG/G
1 CREAM TOPICAL 2 TIMES DAILY
Qty: 15 G | Refills: 0 | Status: SHIPPED | OUTPATIENT
Start: 2023-04-04

## 2023-04-04 NOTE — TELEPHONE ENCOUNTER
Caller: Bernadette Perez    Relationship: Self    Best call back number:[phone]    What medication are you requesting: ANXIETY MEDICATION    What are your current symptoms:ANXITEY/WORRYNESS    Have you had these symptoms before:    [] Yes  [] No    Have you been treated for these symptoms before:   [x] Yes  [] No    If a prescription is needed, what is your preferred pharmacy and phone number: [facility]/PHARMACY #[id number] - [place], [place] - [address] [facility] [phone] [facility] [phone]

## 2023-04-04 NOTE — PROGRESS NOTES
Dereje Alvarez M.D.  Internal Medicine  [facility]  [address], [address]  [place] [address]  [phone]      Chief Complaint  Rash    SUBJECTIVE    History of Present Illness    Bernadette Perez is a 67 y.o. female who presents to the office today as an established patient that last saw me on 12/13/2022.     3 weeks ago she was working in her yard and developed rash behind her legs. She is trying hydrocortisone, aquapohor, benadryl. Has chronic rash on her arms from detergent. . Worse when sweating at night.     She had biopsy of lung in March. She had allergic reaction to tape. She has CT in June.     Review of Systems    Allergies   Allergen Reactions   • Celebrex [Celecoxib] Other (See Comments)     Causes liver problems   • Codeine    • Mobic [Meloxicam] Other (See Comments)     Effects liver   • Penicillins    • Contrast Dye (Echo Or Unknown Ct/Mr) Rash     Broke out in rash on arm   • Tape Rash        Outpatient Medications Marked as Taking for the 4/4/23 encounter (Office Visit) with Dereje Alvaerz MD   Medication Sig Dispense Refill   • atorvastatin (LIPITOR) 20 MG tablet Take 1 tablet by mouth Daily. 90 tablet 2   • Azelastine HCl 137 MCG/SPRAY solution INSTILL 1 TO 2 SPRAYS INTO EACH NOSTRIL TWICE A DAY AS NEEDED     • fluorometholone (Flarex) 0.1 % ophthalmic suspension Apply 1 drop to eye(s) as directed by provider Daily.     • fluticasone (FLONASE) 50 MCG/ACT nasal spray 1 spray into the nostril(s) as directed by provider Daily. Administer 2 sprays in each nostril for each dose.     • losartan (Cozaar) 50 MG tablet Take 1 tablet by mouth Daily. 30 tablet 2   • [DISCONTINUED] losartan (Cozaar) 50 MG tablet Take 1 tablet by mouth Daily. 30 tablet 2        Past Medical History:   Diagnosis Date   • Allergic     ALLERGY INJECTIONS - ALLERGY FIRST EVERY 3 WEEKS    • Arthritis    • Colon polyp    • H/O bone density study 2016    dr torres - [facility]   • H/O complete eye exam  "06/2016   • History of lung biopsy     normal   • Melanoma 06/15/2016    Right mid back     Past Surgical History:   Procedure Laterality Date   • COLONOSCOPY  due    polyps    • EYE SURGERY     • HAND SURGERY     • HEMORRHOIDECTOMY     • HYSTERECTOMY     • MAMMO BILATERAL  08/206    SCHEDULED - DR GAMBINO    • PAP SMEAR  8/2016    SCHEDULED DR GAMBINO / [facility]ON[facility]      Family History   Problem Relation Age of Onset   • Diabetes Mother    • COPD Mother    • Mental illness Father    • Heart disease Father    • Heart disease Other    • Hypertension Other    • Diabetes Other    • Cancer Other    • Thyroid disease Other    • Arthritis Other     reports that she has never smoked. She has never used smokeless tobacco. She reports current alcohol use. She reports that she does not use drugs.    OBJECTIVE    Vital Signs:   /76 (BP Location: Right arm, Patient Position: Sitting, Cuff Size: Adult)   Pulse 77   Ht 160 cm (62.99\")   Wt 68.5 kg (151 lb)   SpO2 98%   BMI 26.76 kg/m²     Physical Exam  Constitutional:       Appearance: Normal appearance. She is normal weight.   Cardiovascular:      Heart sounds: No murmur heard.  Pulmonary:      Effort: Pulmonary effort is normal.   Skin:     General: Skin is warm and dry.      Comments: Macular rash on thighs, and arms   Neurological:      Mental Status: She is alert.   Psychiatric:         Mood and Affect: Mood normal.         Behavior: Behavior normal.         Thought Content: Thought content normal.            The following data was reviewed by: Dereje Alvarez MD on 04/04/2023:  Common labs    Common Labs 11/15/22 11/15/22 11/15/22 11/15/22 3/2/23    0926 0926 0926 0926    Glucose 98       BUN 10       Creatinine 0.90       Sodium 140       Potassium 4.3       Chloride 99       Calcium 10.2       Total Protein 7.3       Albumin 5.20       Total Bilirubin 0.8       Alkaline Phosphatase 107       AST (SGOT) 20       ALT (SGPT) 20       WBC  7.23   4.93   Hemoglobin  14.0   " 12.3   Hematocrit  41.3   35.5   Platelets  354   333   Total Cholesterol   254 (A)     Triglycerides   178 (A)     HDL Cholesterol   54     LDL Cholesterol    167 (A)     Hemoglobin A1C    5.30    (A) Abnormal value       Comments are available for some flowsheets but are not being displayed.           Data reviewed: Recent CT surgery note and path report             ASSESSMENT & PLAN     Diagnoses and all orders for this visit:    1. Rash (Primary)  -     triamcinolone (KENALOG) 0.1 % cream; Apply 1 application topically to the appropriate area as directed 2 (Two) Times a Day.  Dispense: 15 g; Refill: 0  -     predniSONE (DELTASONE) 20 MG tablet; Take 2 tablets by mouth Daily for 5 days.  Dispense: 10 tablet; Refill: 0    2. Primary hypertension  Assessment & Plan:  Hypertension is improving with treatment.  Continue current treatment regimen.  Blood pressure will be reassessed at the next regular appointment.    Orders:  -     losartan (Cozaar) 50 MG tablet; Take 1 tablet by mouth Daily.  Dispense: 30 tablet; Refill: 2  atopic dermatitis     Medications: moisturizing cream, triamcinolone and Prednisone given interfering with sleep. Patient has derm appointment at end of the month. Discussed avoiding scented detergents and lotions.    Health Maintenance Due   Topic Date Due   • ZOSTER VACCINE (1 of 2) Never done   • ANNUAL WELLNESS VISIT  Never done   • DXA SCAN  01/01/2018   • MAMMOGRAM  07/12/2018   • COVID-19 Vaccine (3 - Booster for Pfizer series) 11/10/2021        Follow Up  Return in about 6 months (around 10/4/2023) for Recheck.    Patient/family had no further questions at this time and verbalized understanding of the plan discussed today.

## 2023-06-12 ENCOUNTER — HOSPITAL ENCOUNTER (OUTPATIENT)
Dept: CT IMAGING | Facility: HOSPITAL | Age: 68
Discharge: HOME | End: 2023-06-12
Admitting: STUDENT IN AN ORGANIZED HEALTH CARE EDUCATION/TRAINING PROGRAM
Payer: MEDICARE

## 2023-06-12 ENCOUNTER — HOSPITAL ENCOUNTER (OUTPATIENT)
Dept: CT IMAGING | Facility: HOSPITAL | Age: 68
End: 2023-06-12
Payer: MEDICARE

## 2023-06-12 DIAGNOSIS — R91.1 SOLITARY PULMONARY NODULE: ICD-10-CM

## 2023-06-12 PROCEDURE — 71250 CT THORAX DX C-: CPT

## 2023-06-14 ENCOUNTER — OFFICE VISIT (OUTPATIENT)
Dept: SURGERY | Facility: CLINIC | Age: 68
End: 2023-06-14
Payer: MEDICARE

## 2023-06-14 VITALS
RESPIRATION RATE: 18 BRPM | DIASTOLIC BLOOD PRESSURE: 88 MMHG | HEIGHT: 62 IN | BODY MASS INDEX: 28.52 KG/M2 | OXYGEN SATURATION: 96 % | WEIGHT: 155 LBS | HEART RATE: 70 BPM | SYSTOLIC BLOOD PRESSURE: 148 MMHG

## 2023-06-14 DIAGNOSIS — R91.1 SOLITARY PULMONARY NODULE: Primary | ICD-10-CM

## 2023-06-14 PROCEDURE — 3079F DIAST BP 80-89 MM HG: CPT | Performed by: STUDENT IN AN ORGANIZED HEALTH CARE EDUCATION/TRAINING PROGRAM

## 2023-06-14 PROCEDURE — 3077F SYST BP >= 140 MM HG: CPT | Performed by: STUDENT IN AN ORGANIZED HEALTH CARE EDUCATION/TRAINING PROGRAM

## 2023-06-14 PROCEDURE — 99213 OFFICE O/P EST LOW 20 MIN: CPT | Performed by: STUDENT IN AN ORGANIZED HEALTH CARE EDUCATION/TRAINING PROGRAM

## 2023-06-14 NOTE — PROGRESS NOTES
"Chief Complaint  Bilateral pulmonary nodules.  Non-smoker    Subjective        Bernadette Perez presents to [facility] THORACIC SURGERY  History of Present Illness  Ms. Perez is a pleasant 67-year-old lady who presents for a 3-month follow-up in regards to a right lower lobe pulmonary nodule that measures 14 mm in size and a left lower lobe GGO.  She is a never smoker.  She continues to not have any symptoms.  She denies shortness of breath, fevers, and/or cough.  She denies hemoptysis.  She denies any pain.  Overall she seems to be doing well and in good health.  Objective   Vital Signs:  /88 (BP Location: Left arm, Patient Position: Sitting, Cuff Size: Adult)   Pulse 70   Resp 18   Ht 157.5 cm (62\")   Wt 70.3 kg (155 lb)   SpO2 96%   BMI 28.35 kg/m²   Estimated body mass index is 28.35 kg/m² as calculated from the following:    Height as of this encounter: 157.5 cm (62\").    Weight as of this encounter: 70.3 kg (155 lb).         Physical Exam  Vitals reviewed.   Constitutional:       Appearance: Normal appearance.   Cardiovascular:      Rate and Rhythm: Normal rate and regular rhythm.      Pulses: Normal pulses.      Heart sounds: Normal heart sounds.   Pulmonary:      Effort: Pulmonary effort is normal.      Breath sounds: Normal breath sounds.   Musculoskeletal:         General: Normal range of motion.   Skin:     General: Skin is warm.      Capillary Refill: Capillary refill takes less than 2 seconds.   Neurological:      General: No focal deficit present.      Mental Status: She is alert and oriented to person, place, and time.   Psychiatric:         Mood and Affect: Mood normal.         Behavior: Behavior normal.         Thought Content: Thought content normal.         Judgment: Judgment normal.      Result Review :  CT scan performed on 6/12/2023 was visualized by myself.  When we compare this to her prior scan 3 months ago her 14 mm right lower lobe pulmonary nodule was " approximately 13 mm at that time.  In addition she has a left lower lobe GGO which appears similar in size and shape.             Assessment and Plan   Diagnoses and all orders for this visit:    1. Solitary pulmonary nodule (Primary)  -     CT Chest Without Contrast; Future    Ms. Perez is a pleasant 67-year-old lady who presents for a 3-month interval CT scan in regards to a right lower lobe pulmonary nodule that measures 14 mm in size.  When we compare this to her prior scan which was 13 mm in size these are essentially the same when I look at them in size and characteristic.  In addition her left lower lobe GGO is exactly the same.  I think it is okay to continue to monitor these.  I will order a noncontrasted scan in 3 months time.  If it increases in size at all, at that time we would likely perform a CT-guided biopsy versus a operative wedge resection.       I spent 20 minutes caring for Bernadette on this date of service. This time includes time spent by me in the following activities:preparing for the visit, reviewing tests, obtaining and/or reviewing a separately obtained history, performing a medically appropriate examination and/or evaluation , counseling and educating the patient/family/caregiver, ordering medications, tests, or procedures, referring and communicating with other health care professionals , documenting information in the medical record, independently interpreting results and communicating that information with the patient/family/caregiver, and care coordination  Follow Up   No follow-ups on file.  Patient was given instructions and counseling regarding her condition or for health maintenance advice. Please see specific information pulled into the AVS if appropriate.

## 2023-08-08 RX ORDER — ATORVASTATIN CALCIUM 20 MG/1
TABLET, FILM COATED ORAL
Qty: 90 TABLET | Refills: 2 | Status: SHIPPED | OUTPATIENT
Start: 2023-08-08

## 2023-08-27 DIAGNOSIS — R21 RASH: ICD-10-CM

## 2023-08-28 RX ORDER — TRIAMCINOLONE ACETONIDE 1 MG/G
CREAM TOPICAL
Qty: 30 G | Refills: 0 | Status: SHIPPED | OUTPATIENT
Start: 2023-08-28

## 2023-09-07 ENCOUNTER — HOSPITAL ENCOUNTER (OUTPATIENT)
Dept: CT IMAGING | Facility: HOSPITAL | Age: 68
Discharge: HOME | End: 2023-09-07
Admitting: STUDENT IN AN ORGANIZED HEALTH CARE EDUCATION/TRAINING PROGRAM
Payer: MEDICARE

## 2023-09-07 DIAGNOSIS — R91.1 SOLITARY PULMONARY NODULE: ICD-10-CM

## 2023-09-07 PROCEDURE — 71250 CT THORAX DX C-: CPT

## 2023-09-08 ENCOUNTER — TELEPHONE (OUTPATIENT)
Dept: SURGERY | Facility: CLINIC | Age: 68
End: 2023-09-08
Payer: MEDICARE

## 2023-09-08 NOTE — TELEPHONE ENCOUNTER
Called and left a message about an upcoming appointment with Dr Epperson on 9/13. To please call back if there are any conflicts.

## 2023-09-13 ENCOUNTER — OFFICE VISIT (OUTPATIENT)
Dept: SURGERY | Facility: CLINIC | Age: 68
End: 2023-09-13
Payer: MEDICARE

## 2023-09-13 VITALS
HEART RATE: 66 BPM | OXYGEN SATURATION: 97 % | DIASTOLIC BLOOD PRESSURE: 98 MMHG | SYSTOLIC BLOOD PRESSURE: 154 MMHG | WEIGHT: 155 LBS | BODY MASS INDEX: 28.52 KG/M2 | HEIGHT: 62 IN

## 2023-09-13 DIAGNOSIS — R91.1 SOLITARY PULMONARY NODULE: Primary | ICD-10-CM

## 2023-09-13 PROCEDURE — 3077F SYST BP >= 140 MM HG: CPT | Performed by: STUDENT IN AN ORGANIZED HEALTH CARE EDUCATION/TRAINING PROGRAM

## 2023-09-13 PROCEDURE — 99213 OFFICE O/P EST LOW 20 MIN: CPT | Performed by: STUDENT IN AN ORGANIZED HEALTH CARE EDUCATION/TRAINING PROGRAM

## 2023-09-13 PROCEDURE — 3080F DIAST BP >= 90 MM HG: CPT | Performed by: STUDENT IN AN ORGANIZED HEALTH CARE EDUCATION/TRAINING PROGRAM

## 2023-09-13 RX ORDER — PREDNISOLONE ACETATE 10 MG/ML
SUSPENSION/ DROPS OPHTHALMIC
COMMUNITY

## 2023-09-13 NOTE — PROGRESS NOTES
"Chief Complaint  Incidentally found pulmonary nodule    Subjective        Bernadette Perez presents to [facility] THORACIC SURGERY  History of Present Illness  Ms. Perez is a pleasant 67-year-old lady who presents for a 3-month interval CT scan in regards to an incidentally found pulmonary nodules.  This nodule is located in the right lower lobe and measures approximately 14 mm in size.  In addition she has a left lower lobe GGO's.  This lesion was biopsied back in March 2023 which showed scant fragments of benign lung, no signs of granuloma or neoplasm.  She denies any symptoms.  She is doing well in the last 3 months.  Of note she is a never smoker.  Objective   Vital Signs:  /98 (BP Location: Left arm, Patient Position: Sitting, Cuff Size: Adult)   Pulse 66   Ht 157.5 cm (62\")   Wt 70.3 kg (155 lb)   SpO2 97%   BMI 28.35 kg/m²   Estimated body mass index is 28.35 kg/m² as calculated from the following:    Height as of this encounter: 157.5 cm (62\").    Weight as of this encounter: 70.3 kg (155 lb).           Physical Exam   Result Review :  CT scan of the chest performed on 9/7/2023 was visualized and reviewed by myself.  This lesion is stable in size and scope for last 3 months.         Assessment and Plan   Diagnoses and all orders for this visit:    1. Solitary pulmonary nodule (Primary)  -     CT Chest Without Contrast; Future    Ms. Perez is a pleasant 67-year-old lady who presents for continued surveillance of a right lower lobe pulmonary nodule.  She underwent biopsy of this lesion back in March 2023 due to its increased size in comparison to 2018.  This biopsy did not show any signs of malignancy and/or granuloma  It has been stable on imaging in size since March 2023.  I am going to order a noncontrasted CT scan in 3 months time and we will see her back then.  If it stays stable over the next 3 months we can start to space out her screening images to at least every 6 " months.       I spent 20 minutes caring for Bernadette on this date of service. This time includes time spent by me in the following activities:preparing for the visit, reviewing tests, obtaining and/or reviewing a separately obtained history, performing a medically appropriate examination and/or evaluation , counseling and educating the patient/family/caregiver, ordering medications, tests, or procedures, referring and communicating with other health care professionals , documenting information in the medical record, independently interpreting results and communicating that information with the patient/family/caregiver, and care coordination  Follow Up   No follow-ups on file.  Patient was given instructions and counseling regarding her condition or for health maintenance advice. Please see specific information pulled into the AVS if appropriate.

## 2023-10-05 ENCOUNTER — OFFICE VISIT (OUTPATIENT)
Dept: INTERNAL MEDICINE | Facility: CLINIC | Age: 68
End: 2023-10-05
Payer: MEDICARE

## 2023-10-05 VITALS
OXYGEN SATURATION: 98 % | BODY MASS INDEX: 28.82 KG/M2 | DIASTOLIC BLOOD PRESSURE: 78 MMHG | HEART RATE: 58 BPM | WEIGHT: 156.6 LBS | SYSTOLIC BLOOD PRESSURE: 143 MMHG | HEIGHT: 62 IN

## 2023-10-05 DIAGNOSIS — I10 PRIMARY HYPERTENSION: ICD-10-CM

## 2023-10-05 DIAGNOSIS — F41.8 DEPRESSION WITH ANXIETY: Primary | ICD-10-CM

## 2023-10-05 PROCEDURE — 99213 OFFICE O/P EST LOW 20 MIN: CPT | Performed by: STUDENT IN AN ORGANIZED HEALTH CARE EDUCATION/TRAINING PROGRAM

## 2023-10-05 PROCEDURE — 3078F DIAST BP <80 MM HG: CPT | Performed by: STUDENT IN AN ORGANIZED HEALTH CARE EDUCATION/TRAINING PROGRAM

## 2023-10-05 PROCEDURE — 3077F SYST BP >= 140 MM HG: CPT | Performed by: STUDENT IN AN ORGANIZED HEALTH CARE EDUCATION/TRAINING PROGRAM

## 2023-10-05 RX ORDER — HYDROXYZINE HYDROCHLORIDE 25 MG/1
25 TABLET, FILM COATED ORAL 3 TIMES DAILY PRN
Qty: 90 TABLET | Refills: 0 | Status: SHIPPED | OUTPATIENT
Start: 2023-10-05

## 2023-10-05 RX ORDER — PAROXETINE HYDROCHLORIDE 20 MG/1
20 TABLET, FILM COATED ORAL EVERY MORNING
Qty: 90 TABLET | Refills: 0 | Status: SHIPPED | OUTPATIENT
Start: 2023-10-05

## 2023-10-05 NOTE — PROGRESS NOTES
Dereje Alvarez M.D.  Internal Medicine  [facility]  [address], [address]  [place] [address]  [phone]      Chief Complaint  Follow-up (6 MTH F/U ) and Hypertension    SUBJECTIVE    History of Present Illness    Bernadette Perez is a 67 y.o. female with hypertension, hyperlipidemia, GERD, history of melanoma, history of depression with anxiety who presents to the office today as an established patient that last saw me on 4/4/2023.     I last saw her in April for a rash.    She called our office shortly after requesting medication for anxiety.    She saw orthopedics for right foot pain from Planter fasciitis and Achilles tendinitis.    Thoracic surgery is monitoring her pulmonary nodule.  She has 3-month follow-up CT scan pending for this.  If it increases in size they will perform a CT-guided biopsy versus wedge resection.    Drinks when she feels anxious. Wants to stop. Years ago was on antidepressant (Paxil). May have 2-3 shots of liquor per day. Sleep is okay. She feels some depression. No counseling. She has anhedonia. No panic attacks. Feels irritable.  Denies suicidal ideation. Stressed at home. Feels safe at home.     Review of Systems    Allergies   Allergen Reactions    Celebrex [Celecoxib] Other (See Comments)     Causes liver problems    Codeine     Mobic [Meloxicam] Other (See Comments)     Effects liver    Penicillins     Contrast Dye (Echo Or Unknown Ct/Mr) Rash     Broke out in rash on arm    Tape Rash        Outpatient Medications Marked as Taking for the 10/5/23 encounter (Office Visit) with Dereje Alvarez MD   Medication Sig Dispense Refill    atorvastatin (LIPITOR) 20 MG tablet TAKE 1 TABLET BY MOUTH EVERY DAY 90 tablet 2    Azelastine HCl 137 MCG/SPRAY solution INSTILL 1 TO 2 SPRAYS INTO EACH NOSTRIL TWICE A DAY AS NEEDED      fluorometholone (Flarex) 0.1 % ophthalmic suspension Apply 1 drop to eye(s) as directed by provider Daily.      fluticasone (FLONASE) 50  "MCG/ACT nasal spray 1 spray into the nostril(s) as directed by provider Daily. Administer 2 sprays in each nostril for each dose.      losartan (COZAAR) 50 MG tablet TAKE 1 TABLET BY MOUTH EVERY DAY 30 tablet 2    prednisoLONE acetate (PRED FORTE) 1 % ophthalmic suspension INSTILL 1 DROP INTO AFFECTED EYE 4 TIMES A DAY      triamcinolone (KENALOG) 0.1 % cream APPLY TOPICALLY TO THE APPROPRIATE AREA TWICE A DAY AS DIRECTED 30 g 0        Past Medical History:   Diagnosis Date    Allergic     ALLERGY INJECTIONS - ALLERGY FIRST EVERY 3 WEEKS     Arthritis     Colon polyp     H/O bone density study 2016    dr torres - [facility]    H/O complete eye exam 06/2016    History of lung biopsy     normal    Melanoma 06/15/2016    Right mid back     Past Surgical History:   Procedure Laterality Date    COLONOSCOPY  due    polyps     EYE SURGERY      HAND SURGERY      HEMORRHOIDECTOMY      HYSTERECTOMY      MAMMO BILATERAL  08/206    SCHEDULED - DR TORRES     PAP SMEAR  8/2016    SCHEDULED DR TORRES / TOTAL HYST      Family History   Problem Relation Age of Onset    Diabetes Mother     COPD Mother     Mental illness Father     Heart disease Father     Heart disease Other     Hypertension Other     Diabetes Other     Cancer Other     Thyroid disease Other     Arthritis Other     reports that she has never smoked. She has never used smokeless tobacco. She reports current alcohol use. She reports that she does not use drugs.    OBJECTIVE    Vital Signs:   /78   Pulse 58   Ht 157.5 cm (62.01\")   Wt 71 kg (156 lb 9.6 oz)   SpO2 98%   BMI 28.64 kg/m²     Physical Exam  Constitutional:       General: She is not in acute distress.     Appearance: Normal appearance.   Cardiovascular:      Rate and Rhythm: Normal rate.   Pulmonary:      Effort: Pulmonary effort is normal. No respiratory distress.      Breath sounds: Normal breath sounds.   Neurological:      Mental Status: She is alert. Mental status is at baseline. "   Psychiatric:         Behavior: Behavior normal.         Thought Content: Thought content normal.      Comments: Tearful          The following data was reviewed by: Dereje Alvarez MD on 10/05/2023:  CMP          11/15/2022    09:26   CMP   Glucose 98    BUN 10    Creatinine 0.90    Sodium 140    Potassium 4.3    Chloride 99    Calcium 10.2    Total Protein 7.3    Albumin 5.20    Globulin 2.1    Total Bilirubin 0.8    Alkaline Phosphatase 107    AST (SGOT) 20    ALT (SGPT) 20    BUN/Creatinine Ratio 11.1      CBC w/diff          11/15/2022    09:26 3/2/2023    08:57   CBC w/Diff   WBC 7.23  4.93    RBC 4.79  4.24    Hemoglobin 14.0  12.3    Hematocrit 41.3  35.5    MCV 86.2  83.7    MCH 29.2  29.0    MCHC 33.9  34.6    RDW 13.3  12.9    Platelets 354  333    Neutrophil Rel % 68.9  58.3    Immature Granulocyte Rel %  0.4    Lymphocyte Rel % 17.8  30.6    Monocyte Rel % 7.2  6.3    Eosinophil Rel % 5.0  3.4    Basophil Rel % 0.8  1.0      Lipid Panel          11/15/2022    09:26   Lipid Panel   Total Cholesterol 254    Triglycerides 178    HDL Cholesterol 54    VLDL Cholesterol 33    LDL Cholesterol  167    LDL/HDL Ratio 3.04        Data reviewed : Recent CT surgery.               ASSESSMENT & PLAN     Diagnoses and all orders for this visit:    1. Depression with anxiety (Primary)  -     PARoxetine (Paxil) 20 MG tablet; Take 1 tablet by mouth Every Morning.  Dispense: 90 tablet; Refill: 0  -     hydrOXYzine (ATARAX) 25 MG tablet; Take 1 tablet by mouth 3 (Three) Times a Day As Needed for Anxiety.  Dispense: 90 tablet; Refill: 0    2. Primary hypertension       67 y.o. female with hypertension, hyperlipidemia, GERD, history of melanoma, history of depression with anxiety here today for follow-up.  She is reporting increased anxiety related to her relationship issues.  She is interested in restarting medication today.  She states Paxil worked well for 1 past.  We will restart this today.  We will follow-up in 1 month.   She can use hydroxyzine as needed for acute anxiety.  Anxiety is complicated by alcohol use.  She is intending to abstain from alcohol.  Discussed counseling and Alcoholics Anonymous today.    Blood pressure elevated in clinic today.  She is under acute stress.  I encouraged patient to monitor at home and we will follow-up at next appointment.      Health Maintenance Due   Topic Date Due    BMI FOLLOWUP  Never done    ZOSTER VACCINE (1 of 2) Never done    ANNUAL WELLNESS VISIT  Never done    DXA SCAN  01/01/2018    MAMMOGRAM  07/12/2018    COVID-19 Vaccine (3 - 2023-24 season) 09/01/2023        Follow Up  Return in about 4 weeks (around 11/2/2023) for Medicare Wellness.    Patient/family had no further questions at this time and verbalized understanding of the plan discussed today.

## 2023-10-14 DIAGNOSIS — I10 PRIMARY HYPERTENSION: ICD-10-CM

## 2023-10-16 RX ORDER — LOSARTAN POTASSIUM 50 MG/1
TABLET ORAL
Qty: 30 TABLET | Refills: 2 | Status: SHIPPED | OUTPATIENT
Start: 2023-10-16

## 2023-10-31 DIAGNOSIS — F41.8 DEPRESSION WITH ANXIETY: ICD-10-CM

## 2023-10-31 RX ORDER — HYDROXYZINE HYDROCHLORIDE 25 MG/1
25 TABLET, FILM COATED ORAL 3 TIMES DAILY PRN
Qty: 90 TABLET | Refills: 0 | Status: SHIPPED | OUTPATIENT
Start: 2023-10-31

## 2023-11-07 ENCOUNTER — OFFICE VISIT (OUTPATIENT)
Dept: INTERNAL MEDICINE | Facility: CLINIC | Age: 68
End: 2023-11-07
Payer: MEDICARE

## 2023-11-07 VITALS
HEIGHT: 62 IN | DIASTOLIC BLOOD PRESSURE: 72 MMHG | HEART RATE: 68 BPM | SYSTOLIC BLOOD PRESSURE: 116 MMHG | OXYGEN SATURATION: 98 % | WEIGHT: 154.8 LBS | BODY MASS INDEX: 28.49 KG/M2

## 2023-11-07 DIAGNOSIS — Z12.11 SCREENING FOR COLON CANCER: ICD-10-CM

## 2023-11-07 DIAGNOSIS — I10 PRIMARY HYPERTENSION: ICD-10-CM

## 2023-11-07 DIAGNOSIS — Z00.00 MEDICARE ANNUAL WELLNESS VISIT, SUBSEQUENT: Primary | ICD-10-CM

## 2023-11-07 DIAGNOSIS — E78.5 HYPERLIPIDEMIA, UNSPECIFIED HYPERLIPIDEMIA TYPE: ICD-10-CM

## 2023-11-07 LAB
ALBUMIN SERPL-MCNC: 5.3 G/DL (ref 3.5–5.2)
ALBUMIN/GLOB SERPL: 3.3 G/DL
ALP SERPL-CCNC: 84 U/L (ref 39–117)
ALT SERPL-CCNC: 34 U/L (ref 1–33)
AST SERPL-CCNC: 24 U/L (ref 1–32)
BASOPHILS # BLD AUTO: 0.06 10*3/MM3 (ref 0–0.2)
BASOPHILS NFR BLD AUTO: 1.2 % (ref 0–1.5)
BILIRUB SERPL-MCNC: 1 MG/DL (ref 0–1.2)
BUN SERPL-MCNC: 15 MG/DL (ref 8–23)
BUN/CREAT SERPL: 15.2 (ref 7–25)
CALCIUM SERPL-MCNC: 10 MG/DL (ref 8.6–10.5)
CHLORIDE SERPL-SCNC: 101 MMOL/L (ref 98–107)
CHOLEST SERPL-MCNC: 214 MG/DL (ref 0–200)
CO2 SERPL-SCNC: 28.2 MMOL/L (ref 22–29)
CREAT SERPL-MCNC: 0.99 MG/DL (ref 0.57–1)
EGFRCR SERPLBLD CKD-EPI 2021: 63 ML/MIN/1.73
EOSINOPHIL # BLD AUTO: 0.22 10*3/MM3 (ref 0–0.4)
EOSINOPHIL NFR BLD AUTO: 4.5 % (ref 0.3–6.2)
ERYTHROCYTE [DISTWIDTH] IN BLOOD BY AUTOMATED COUNT: 12 % (ref 12.3–15.4)
GLOBULIN SER CALC-MCNC: 1.6 GM/DL
GLUCOSE SERPL-MCNC: 94 MG/DL (ref 65–99)
HCT VFR BLD AUTO: 40.1 % (ref 34–46.6)
HDLC SERPL-MCNC: 60 MG/DL (ref 40–60)
HGB BLD-MCNC: 13.6 G/DL (ref 12–15.9)
IMM GRANULOCYTES # BLD AUTO: 0.01 10*3/MM3 (ref 0–0.05)
IMM GRANULOCYTES NFR BLD AUTO: 0.2 % (ref 0–0.5)
LDLC SERPL CALC-MCNC: 126 MG/DL (ref 0–100)
LYMPHOCYTES # BLD AUTO: 1.46 10*3/MM3 (ref 0.7–3.1)
LYMPHOCYTES NFR BLD AUTO: 30 % (ref 19.6–45.3)
MCH RBC QN AUTO: 28.8 PG (ref 26.6–33)
MCHC RBC AUTO-ENTMCNC: 33.9 G/DL (ref 31.5–35.7)
MCV RBC AUTO: 85 FL (ref 79–97)
MONOCYTES # BLD AUTO: 0.3 10*3/MM3 (ref 0.1–0.9)
MONOCYTES NFR BLD AUTO: 6.2 % (ref 5–12)
NEUTROPHILS # BLD AUTO: 2.82 10*3/MM3 (ref 1.7–7)
NEUTROPHILS NFR BLD AUTO: 57.9 % (ref 42.7–76)
NRBC BLD AUTO-RTO: 0 /100 WBC (ref 0–0.2)
PLATELET # BLD AUTO: 377 10*3/MM3 (ref 140–450)
POTASSIUM SERPL-SCNC: 4.6 MMOL/L (ref 3.5–5.2)
PROT SERPL-MCNC: 6.9 G/DL (ref 6–8.5)
RBC # BLD AUTO: 4.72 10*6/MM3 (ref 3.77–5.28)
SODIUM SERPL-SCNC: 140 MMOL/L (ref 136–145)
TRIGL SERPL-MCNC: 162 MG/DL (ref 0–150)
VLDLC SERPL CALC-MCNC: 28 MG/DL (ref 5–40)
WBC # BLD AUTO: 4.87 10*3/MM3 (ref 3.4–10.8)

## 2023-11-07 NOTE — ASSESSMENT & PLAN NOTE
Hypertension is unchanged.  Continue current treatment regimen.  Dietary sodium restriction.  Weight loss.  Regular aerobic exercise.  Ambulatory blood pressure monitoring.  Patient was asked to check their BP 3-5 times weekly at various times of day after being seated for 5 minutes or longer. Discussed that goal blood pressure is less than 130 systolic and less than 80 diastolic. Asked patient to bring log to next visit.     Blood pressure will be reassessed at the next regular appointment.

## 2023-11-07 NOTE — PROGRESS NOTES
The ABCs of the Annual Wellness Visit  Subsequent Medicare Wellness Visit    Chief Complaint   Patient presents with    Medicare Wellness-subsequent      Subjective    History of Present Illness:  Bernadette Perez is a 67 y.o. female with hypertension, hyperlipidemia, GERD, history of melanoma, history of depression with anxiety  who presents for a Subsequent Medicare Wellness Visit.    The following portions of the patient's history were reviewed and   updated as appropriate: allergies, current medications, past family history, past medical history, past social history, past surgical history, and problem list.    Feels fluid in both ears. Takes allergy shots and Flonase.     Thoracic surgery is monitoring her pulmonary nodule.  Has repeat CT pending.     At last appointment we started Paxil and PRN hydroxyzine.  Discussed counseling and Alcoholics Anonymous. Now off alcohol. Feels Paxil is helping. Takes hydroxyzine as needed. Not doing counseling.    Has corneal transplant on the right but left is pending. Getting lung nodule workup first.     Compared to one year ago, the patient feels her physical   health is the same.    Compared to one year ago, the patient feels her mental   health is better.    Recent Hospitalizations:  She was not admitted to the hospital during the last year.       Current Medical Providers:  Patient Care Team:  Dereje Alvarez MD as PCP - General (Internal Medicine)  Robinson Epperson MD PhD as Consulting Physician (Thoracic Surgery)  Teodoro Kaur MD as Consulting Physician (Dermatology)  Jad Steward MD as Consulting Physician (Ophthalmology)      Outpatient Medications Prior to Visit   Medication Sig Dispense Refill    atorvastatin (LIPITOR) 20 MG tablet TAKE 1 TABLET BY MOUTH EVERY DAY 90 tablet 2    Azelastine HCl 137 MCG/SPRAY solution INSTILL 1 TO 2 SPRAYS INTO EACH NOSTRIL TWICE A DAY AS NEEDED      fluorometholone (Flarex) 0.1 % ophthalmic suspension Apply 1 drop to  "eye(s) as directed by provider Daily.      fluticasone (FLONASE) 50 MCG/ACT nasal spray 1 spray into the nostril(s) as directed by provider Daily. Administer 2 sprays in each nostril for each dose.      hydrOXYzine (ATARAX) 25 MG tablet TAKE 1 TABLET BY MOUTH 3 TIMES A DAY AS NEEDED FOR ANXIETY. 90 tablet 0    losartan (COZAAR) 50 MG tablet TAKE 1 TABLET BY MOUTH EVERY DAY 30 tablet 2    PARoxetine (Paxil) 20 MG tablet Take 1 tablet by mouth Every Morning. 90 tablet 0    prednisoLONE acetate (PRED FORTE) 1 % ophthalmic suspension INSTILL 1 DROP INTO AFFECTED EYE 4 TIMES A DAY      triamcinolone (KENALOG) 0.1 % cream APPLY TOPICALLY TO THE APPROPRIATE AREA TWICE A DAY AS DIRECTED 30 g 0     No facility-administered medications prior to visit.       No opioid medication identified on active medication list. I have reviewed chart for other potential  high risk medication/s and harmful drug interactions in the elderly.        Aspirin is not on active medication list.  Aspirin use is not indicated based on review of current medical condition/s. Risk of harm outweighs potential benefits.  .        Patient Active Problem List   Diagnosis    Arthritis    Allergic rhinitis    Hx of colonic polyps    Family history of diabetes mellitus type II    Family history of heart disease    Hyperlipidemia    Elevated liver enzymes    Gastroesophageal reflux disease    Depression with anxiety    Primary hypertension    Melanoma     Advance Care Planning  Advance Directive is not on file.  ACP discussion was held with the patient during this visit. Patient does not have an advance directive, information provided.            Objective    Vitals:    11/07/23 0729   BP: 116/72   Pulse: 68   SpO2: 98%   Weight: 70.2 kg (154 lb 12.8 oz)   Height: 157.5 cm (62.01\")     Estimated body mass index is 28.31 kg/m² as calculated from the following:    Height as of this encounter: 157.5 cm (62.01\").    Weight as of this encounter: 70.2 kg (154 lb " 12.8 oz).    BMI is >= 25 and <30. (Overweight) The following options were offered after discussion;: weight loss educational material (shared in after visit summary)      Does the patient have evidence of cognitive impairment? No    Physical Exam  Constitutional:       Appearance: Normal appearance. She is normal weight.   HENT:      Right Ear: Tympanic membrane normal.      Left Ear: Tympanic membrane normal.   Cardiovascular:      Rate and Rhythm: Normal rate and regular rhythm.      Heart sounds: Normal heart sounds. No murmur heard.  Pulmonary:      Effort: Pulmonary effort is normal.      Breath sounds: Normal breath sounds.   Abdominal:      General: Abdomen is flat. There is no distension.      Palpations: Abdomen is soft.      Tenderness: There is no abdominal tenderness.   Musculoskeletal:      Right lower leg: No edema.      Left lower leg: No edema.   Skin:     General: Skin is warm and dry.   Neurological:      Mental Status: She is alert.   Psychiatric:         Mood and Affect: Mood normal.         Behavior: Behavior normal.         Thought Content: Thought content normal.                 HEALTH RISK ASSESSMENT    Smoking Status:  Social History     Tobacco Use   Smoking Status Never   Smokeless Tobacco Never     Alcohol Consumption:  Social History     Substance and Sexual Activity   Alcohol Use Yes    Comment: social     Fall Risk Screen:    STEADI Fall Risk Assessment was completed, and patient is at LOW risk for falls.Assessment completed on:2023    Depression Screenin/7/2023     7:30 AM   PHQ-2/PHQ-9 Depression Screening   Little Interest or Pleasure in Doing Things 0-->not at all   Feeling Down, Depressed or Hopeless 0-->not at all   PHQ-9: Brief Depression Severity Measure Score 0       Health Habits and Functional and Cognitive Screenin/7/2023     7:29 AM   Functional & Cognitive Status   Do you have difficulty preparing food and eating? No   Do you have difficulty  bathing yourself, getting dressed or grooming yourself? No   Do you have difficulty using the toilet? No   Do you have difficulty moving around from place to place? No   Do you have trouble with steps or getting out of a bed or a chair? No   Do you need help using the phone?  No   Are you deaf or do you have serious difficulty hearing?  No   Do you need help to go to places out of walking distance? No   Do you need help shopping? No   Do you need help preparing meals?  No   Do you need help with housework?  No   Do you need help with laundry? No   Do you need help taking your medications? No   Do you need help managing money? No   Do you ever drive or ride in a car without wearing a seat belt? No       Age-appropriate Screening Schedule:  Refer to the list below for future screening recommendations based on patient's age, sex and/or medical conditions. Orders for these recommended tests are listed in the plan section. The patient has been provided with a written plan.    Health Maintenance   Topic Date Due    ZOSTER VACCINE (1 of 2) Never done    ANNUAL WELLNESS VISIT  Never done    DXA SCAN  01/01/2018    MAMMOGRAM  07/12/2018    COVID-19 Vaccine (3 - 2023-24 season) 09/01/2023    LIPID PANEL  11/15/2023    TDAP/TD VACCINES (2 - Tdap) 01/01/2024    BMI FOLLOWUP  11/07/2024    COLORECTAL CANCER SCREENING  07/12/2026    Pneumococcal Vaccine 65+  Completed    HEPATITIS C SCREENING  Discontinued    INFLUENZA VACCINE  Discontinued    PAP SMEAR  Discontinued              CT Chest Without Contrast Diagnostic (09/07/2023 13:59)     Assessment & Plan   CMS Preventative Services Quick Reference  Risk Factors Identified During Encounter  Depression/Dysphoria: Current medication is effective, no change recommended  Immunizations Discussed/Encouraged: Influenza, Shingrix, and COVID19  The above risks/problems have been discussed with the patient.  Follow up actions/plans if indicated are seen below in the Assessment/Plan  Section.  Pertinent information has been shared with the patient in the After Visit Summary.    The 10-year ASCVD risk score (Tomy STONE, et al., 2019) is: 7.7%    Values used to calculate the score:      Age: 67 years      Sex: Female      Is Non- : No      Diabetic: No      Tobacco smoker: No      Systolic Blood Pressure: 116 mmHg      Is BP treated: Yes      HDL Cholesterol: 54 mg/dL      Total Cholesterol: 254 mg/dL      Diagnoses and all orders for this visit:    1. Medicare annual wellness visit, subsequent (Primary)    2. Primary hypertension  Assessment & Plan:  Hypertension is unchanged.  Continue current treatment regimen.  Dietary sodium restriction.  Weight loss.  Regular aerobic exercise.  Ambulatory blood pressure monitoring.  Patient was asked to check their BP 3-5 times weekly at various times of day after being seated for 5 minutes or longer. Discussed that goal blood pressure is less than 130 systolic and less than 80 diastolic. Asked patient to bring log to next visit.     Blood pressure will be reassessed at the next regular appointment.    Orders:  -     Comprehensive Metabolic Panel  -     CBC & Differential    3. Hyperlipidemia, unspecified hyperlipidemia type  -     Lipid Panel    4. Screening for colon cancer  -     Ambulatory Referral For Screening Colonoscopy      [facility] and [facility] (Dr. Victoria).  If she does not end up returning to GYN counseled we can order these screenings here.     Due Colonoscopy. She never did last year. Ordering today.     Follow Up:   Return in about 6 months (around 5/7/2024).     An After Visit Summary and PPPS were made available to the patient.

## 2023-12-06 ENCOUNTER — TELEPHONE (OUTPATIENT)
Dept: SURGERY | Facility: CLINIC | Age: 68
End: 2023-12-06
Payer: MEDICARE

## 2023-12-06 NOTE — TELEPHONE ENCOUNTER
Called and left message about Dr. Epperson being out on 12/13 and wondering if patient could do 12/14 with Minnie.

## 2023-12-08 ENCOUNTER — HOSPITAL ENCOUNTER (OUTPATIENT)
Dept: CT IMAGING | Facility: HOSPITAL | Age: 68
Discharge: HOME | End: 2023-12-08
Admitting: STUDENT IN AN ORGANIZED HEALTH CARE EDUCATION/TRAINING PROGRAM
Payer: MEDICARE

## 2023-12-08 DIAGNOSIS — R91.1 SOLITARY PULMONARY NODULE: ICD-10-CM

## 2023-12-08 PROCEDURE — 71250 CT THORAX DX C-: CPT

## 2023-12-13 NOTE — PROGRESS NOTES
"Chief Complaint  Follow-up, right lower lobe lung nodule    Subjective        Bernadette Perez presents to [facility] THORACIC SURGERY  History of Present Illness    Ms. Perez is a very pleasant 67-year-old lady who is seen today in follow-up for a right lower lobe pulmonary nodule incidentally seen on imaging.  She is a never smoker.  She has been established in our practice with Dr. Epperson since February 2023 and has been undergoing surveillance since that time.  PET imaging performed on 2/13/2023 demonstrated a max SUV of 2.0.  CT needle biopsy in March 2023 was negative for malignancy and granuloma.  She has no new pulmonary symptoms. She is active at baseline. She presents today feeling well with CT chest.     Of note, she has a history of stage II melanoma on her back that was removed in 2016.    Objective   Vital Signs:  /84 (BP Location: Left arm, Patient Position: Sitting, Cuff Size: Adult)   Pulse 76   Ht 157.5 cm (62.01\")   Wt 69.4 kg (153 lb)   SpO2 96%   BMI 27.98 kg/m²   Estimated body mass index is 27.98 kg/m² as calculated from the following:    Height as of this encounter: 157.5 cm (62.01\").    Weight as of this encounter: 69.4 kg (153 lb).            Physical Exam  Constitutional:       General: She is not in acute distress.     Appearance: Normal appearance. She is not ill-appearing.   HENT:      Head: Normocephalic and atraumatic.   Cardiovascular:      Rate and Rhythm: Normal rate.   Pulmonary:      Effort: Pulmonary effort is normal.   Musculoskeletal:         General: Normal range of motion.      Cervical back: Normal range of motion and neck supple.   Skin:     General: Skin is warm.   Neurological:      General: No focal deficit present.      Mental Status: She is alert.      Motor: No weakness.   Psychiatric:         Mood and Affect: Mood normal.         Thought Content: Thought content normal.        Result Review :          I have independently reviewed the CT " of the chest performed on 12/8/2023 which demonstrates a 16 x 10 mm nodule in the right lower lobe (previously 13 x 9 mm in January 2023, and 8 x 5 mm in July 2018).  This is not significantly changed from her previous CT chest in September 2023 with this was 15 mm.  No appreciable mediastinal lymphadenopathy.  No pleural or pericardial effusion.    PET/CT 2/13/2023: 1.3 cm nodule in the right lower lobe with max SUV of 2.0.  No suspicious hypermetabolic activity within the neck abdomen or pelvis.           Assessment and Plan   Diagnoses and all orders for this visit:    1. Solitary pulmonary nodule (Primary)  -     Cancel: NM PET/CT Skull Base to Mid Thigh; Future  -     Complete PFT - Pre & Post Bronchodilator; Future  -     NM PET/CT Skull Base to Mid Thigh; Future  -     CT Chest Without Contrast; Future  -     Case Request; Standing  -     sodium chloride 0.9 % flush 3 mL  -     sodium chloride 0.9 % flush 3-10 mL  -     sodium chloride 0.9 % infusion 40 mL  -     Case Request    2. History of malignant melanoma of back    Other orders  -     Follow Anesthesia Guidelines / Protocol; Future  -     Follow Anesthesia Guidelines / Protocol; Standing  -     Verify / Perform Chlorhexidine Skin Prep; Standing  -     Verify / Perform Chlorhexidine Skin Prep if Indicated (If Not Already Completed); Standing  -     Obtain Informed Consent; Future  -     Provide NPO Instructions to Patient; Future  -     Chlorhexidine Skin Prep; Future  -     Insert Peripheral IV; Standing  -     Saline Lock & Maintain IV Access; Standing    Ms. Perez's most recent CT chest demonstrates a 1.6 cm nodule in the right lower lobe that has been slowly enlarging over time.  While CT-guided needle biopsy performed on 3/2/2023 did not demonstrate evidence of neoplasm, I am concerned this could represent a bronchogenic malignancy and would recommend Ion bronchoscopy for further evaluation.  I will also like to repeat her PET scan so that we are  able to compare this to her previous PET from February 2023.  She will need CT chest with ION protocol prior to her procedure in addition to updated pulmonary function studies.  This was discussed at length with the patient and she agrees to proceed.  Preoperative orders have been placed in [facility] and she will be scheduled at Dr. Epperson's next available date.  She will follow-up with him in clinic once all testing has been completed.       I spent 31 minutes caring for Bernadette on this date of service. This time includes time spent by me in the following activities:preparing for the visit, reviewing tests, obtaining and/or reviewing a separately obtained history, performing a medically appropriate examination and/or evaluation , counseling and educating the patient/family/caregiver, ordering medications, tests, or procedures, documenting information in the medical record, independently interpreting results and communicating that information with the patient/family/caregiver, and care coordination    Follow Up   Return in about 2 weeks (around 12/28/2023) for Next scheduled follow up.  Patient was given instructions and counseling regarding her condition or for health maintenance advice. Please see specific information pulled into the AVS if appropriate.

## 2023-12-13 NOTE — H&P (VIEW-ONLY)
"Chief Complaint  Follow-up, right lower lobe lung nodule    Subjective        Bernadette Perez presents to [facility] THORACIC SURGERY  History of Present Illness    Ms. Perez is a very pleasant 67-year-old lady who is seen today in follow-up for a right lower lobe pulmonary nodule incidentally seen on imaging.  She is a never smoker.  She has been established in our practice with Dr. Epperson since February 2023 and has been undergoing surveillance since that time.  PET imaging performed on 2/13/2023 demonstrated a max SUV of 2.0.  CT needle biopsy in March 2023 was negative for malignancy and granuloma.  She has no new pulmonary symptoms. She is active at baseline. She presents today feeling well with CT chest.     Of note, she has a history of stage II melanoma on her back that was removed in 2016.    Objective   Vital Signs:  /84 (BP Location: Left arm, Patient Position: Sitting, Cuff Size: Adult)   Pulse 76   Ht 157.5 cm (62.01\")   Wt 69.4 kg (153 lb)   SpO2 96%   BMI 27.98 kg/m²   Estimated body mass index is 27.98 kg/m² as calculated from the following:    Height as of this encounter: 157.5 cm (62.01\").    Weight as of this encounter: 69.4 kg (153 lb).            Physical Exam  Constitutional:       General: She is not in acute distress.     Appearance: Normal appearance. She is not ill-appearing.   HENT:      Head: Normocephalic and atraumatic.   Cardiovascular:      Rate and Rhythm: Normal rate.   Pulmonary:      Effort: Pulmonary effort is normal.   Musculoskeletal:         General: Normal range of motion.      Cervical back: Normal range of motion and neck supple.   Skin:     General: Skin is warm.   Neurological:      General: No focal deficit present.      Mental Status: She is alert.      Motor: No weakness.   Psychiatric:         Mood and Affect: Mood normal.         Thought Content: Thought content normal.        Result Review :          I have independently reviewed the CT " of the chest performed on 12/8/2023 which demonstrates a 16 x 10 mm nodule in the right lower lobe (previously 13 x 9 mm in January 2023, and 8 x 5 mm in July 2018).  This is not significantly changed from her previous CT chest in September 2023 with this was 15 mm.  No appreciable mediastinal lymphadenopathy.  No pleural or pericardial effusion.    PET/CT 2/13/2023: 1.3 cm nodule in the right lower lobe with max SUV of 2.0.  No suspicious hypermetabolic activity within the neck abdomen or pelvis.           Assessment and Plan   Diagnoses and all orders for this visit:    1. Solitary pulmonary nodule (Primary)  -     Cancel: NM PET/CT Skull Base to Mid Thigh; Future  -     Complete PFT - Pre & Post Bronchodilator; Future  -     NM PET/CT Skull Base to Mid Thigh; Future  -     CT Chest Without Contrast; Future  -     Case Request; Standing  -     sodium chloride 0.9 % flush 3 mL  -     sodium chloride 0.9 % flush 3-10 mL  -     sodium chloride 0.9 % infusion 40 mL  -     Case Request    2. History of malignant melanoma of back    Other orders  -     Follow Anesthesia Guidelines / Protocol; Future  -     Follow Anesthesia Guidelines / Protocol; Standing  -     Verify / Perform Chlorhexidine Skin Prep; Standing  -     Verify / Perform Chlorhexidine Skin Prep if Indicated (If Not Already Completed); Standing  -     Obtain Informed Consent; Future  -     Provide NPO Instructions to Patient; Future  -     Chlorhexidine Skin Prep; Future  -     Insert Peripheral IV; Standing  -     Saline Lock & Maintain IV Access; Standing    Ms. Perez's most recent CT chest demonstrates a 1.6 cm nodule in the right lower lobe that has been slowly enlarging over time.  While CT-guided needle biopsy performed on 3/2/2023 did not demonstrate evidence of neoplasm, I am concerned this could represent a bronchogenic malignancy and would recommend Ion bronchoscopy for further evaluation.  I will also like to repeat her PET scan so that we are  able to compare this to her previous PET from February 2023.  She will need CT chest with ION protocol prior to her procedure in addition to updated pulmonary function studies.  This was discussed at length with the patient and she agrees to proceed.  Preoperative orders have been placed in [facility] and she will be scheduled at Dr. Epperson's next available date.  She will follow-up with him in clinic once all testing has been completed.       I spent 31 minutes caring for Bernadette on this date of service. This time includes time spent by me in the following activities:preparing for the visit, reviewing tests, obtaining and/or reviewing a separately obtained history, performing a medically appropriate examination and/or evaluation , counseling and educating the patient/family/caregiver, ordering medications, tests, or procedures, documenting information in the medical record, independently interpreting results and communicating that information with the patient/family/caregiver, and care coordination    Follow Up   Return in about 2 weeks (around 12/28/2023) for Next scheduled follow up.  Patient was given instructions and counseling regarding her condition or for health maintenance advice. Please see specific information pulled into the AVS if appropriate.

## 2023-12-14 ENCOUNTER — OFFICE VISIT (OUTPATIENT)
Dept: SURGERY | Facility: CLINIC | Age: 68
End: 2023-12-14
Payer: MEDICARE

## 2023-12-14 ENCOUNTER — TELEPHONE (OUTPATIENT)
Dept: SURGERY | Facility: CLINIC | Age: 68
End: 2023-12-14

## 2023-12-14 VITALS
WEIGHT: 153 LBS | HEIGHT: 62 IN | SYSTOLIC BLOOD PRESSURE: 142 MMHG | DIASTOLIC BLOOD PRESSURE: 84 MMHG | HEART RATE: 76 BPM | OXYGEN SATURATION: 96 % | BODY MASS INDEX: 28.16 KG/M2

## 2023-12-14 DIAGNOSIS — R91.1 SOLITARY PULMONARY NODULE: Primary | ICD-10-CM

## 2023-12-14 DIAGNOSIS — Z85.820 HISTORY OF MALIGNANT MELANOMA OF BACK: ICD-10-CM

## 2023-12-14 PROCEDURE — 1160F RVW MEDS BY RX/DR IN RCRD: CPT | Performed by: NURSE PRACTITIONER

## 2023-12-14 PROCEDURE — 99214 OFFICE O/P EST MOD 30 MIN: CPT | Performed by: NURSE PRACTITIONER

## 2023-12-14 PROCEDURE — 3079F DIAST BP 80-89 MM HG: CPT | Performed by: NURSE PRACTITIONER

## 2023-12-14 PROCEDURE — 3077F SYST BP >= 140 MM HG: CPT | Performed by: NURSE PRACTITIONER

## 2023-12-14 PROCEDURE — 1159F MED LIST DOCD IN RCRD: CPT | Performed by: NURSE PRACTITIONER

## 2023-12-14 RX ORDER — SODIUM CHLORIDE 0.9 % (FLUSH) 0.9 %
3-10 SYRINGE (ML) INJECTION AS NEEDED
Status: CANCELLED | OUTPATIENT
Start: 2023-12-18

## 2023-12-14 RX ORDER — SODIUM CHLORIDE 0.9 % (FLUSH) 0.9 %
3 SYRINGE (ML) INJECTION EVERY 12 HOURS SCHEDULED
Status: CANCELLED | OUTPATIENT
Start: 2023-12-18

## 2023-12-14 RX ORDER — SODIUM CHLORIDE 9 MG/ML
40 INJECTION, SOLUTION INTRAVENOUS AS NEEDED
Status: CANCELLED | OUTPATIENT
Start: 2023-12-18

## 2023-12-14 NOTE — TELEPHONE ENCOUNTER
Provider: NESHA    Caller: SONJA RADFORD    Relationship to Patient: SELF    Pharmacy: N/A    Phone Number [phone]    Reason for Call:PT WANTING DIDIER TO KNOW SHE CANNOT DO TESTING ON 12/20/23 BUT IS OTHERWISE FREE.

## 2023-12-15 ENCOUNTER — HOSPITAL ENCOUNTER (OUTPATIENT)
Dept: PET IMAGING | Facility: HOSPITAL | Age: 68
Discharge: HOME | End: 2023-12-15
Payer: MEDICARE

## 2023-12-15 ENCOUNTER — PRE-ADMISSION TESTING (OUTPATIENT)
Dept: PREADMISSION TESTING | Facility: HOSPITAL | Age: 68
End: 2023-12-15
Payer: MEDICARE

## 2023-12-15 ENCOUNTER — HOSPITAL ENCOUNTER (OUTPATIENT)
Dept: RESPIRATORY THERAPY | Facility: HOSPITAL | Age: 68
Discharge: HOME | End: 2023-12-15
Payer: MEDICARE

## 2023-12-15 VITALS
BODY MASS INDEX: 27.14 KG/M2 | OXYGEN SATURATION: 97 % | HEART RATE: 62 BPM | RESPIRATION RATE: 16 BRPM | TEMPERATURE: 98.7 F | WEIGHT: 153.2 LBS | DIASTOLIC BLOOD PRESSURE: 77 MMHG | HEIGHT: 63 IN | SYSTOLIC BLOOD PRESSURE: 134 MMHG

## 2023-12-15 DIAGNOSIS — R91.1 SOLITARY PULMONARY NODULE: ICD-10-CM

## 2023-12-15 LAB
ANION GAP SERPL CALCULATED.3IONS-SCNC: 12 MMOL/L (ref 5–15)
BUN SERPL-MCNC: 17 MG/DL (ref 8–23)
BUN/CREAT SERPL: 17.7 (ref 7–25)
CALCIUM SPEC-SCNC: 9.6 MG/DL (ref 8.6–10.5)
CHLORIDE SERPL-SCNC: 104 MMOL/L (ref 98–107)
CO2 SERPL-SCNC: 25 MMOL/L (ref 22–29)
CREAT SERPL-MCNC: 0.96 MG/DL (ref 0.57–1)
DEPRECATED RDW RBC AUTO: 38.3 FL (ref 37–54)
EGFRCR SERPLBLD CKD-EPI 2021: 65.4 ML/MIN/1.73
ERYTHROCYTE [DISTWIDTH] IN BLOOD BY AUTOMATED COUNT: 12.8 % (ref 12.3–15.4)
GLUCOSE BLDC GLUCOMTR-MCNC: 106 MG/DL (ref 70–130)
GLUCOSE SERPL-MCNC: 106 MG/DL (ref 65–99)
HCT VFR BLD AUTO: 38.7 % (ref 34–46.6)
HGB BLD-MCNC: 13.1 G/DL (ref 12–15.9)
MCH RBC QN AUTO: 28.5 PG (ref 26.6–33)
MCHC RBC AUTO-ENTMCNC: 33.9 G/DL (ref 31.5–35.7)
MCV RBC AUTO: 84.3 FL (ref 79–97)
PLATELET # BLD AUTO: 339 10*3/MM3 (ref 140–450)
PMV BLD AUTO: 9.5 FL (ref 6–12)
POTASSIUM SERPL-SCNC: 3.9 MMOL/L (ref 3.5–5.2)
QT INTERVAL: 436 MS
QTC INTERVAL: 425 MS
RBC # BLD AUTO: 4.59 10*6/MM3 (ref 3.77–5.28)
SODIUM SERPL-SCNC: 141 MMOL/L (ref 136–145)
WBC NRBC COR # BLD AUTO: 5.55 10*3/MM3 (ref 3.4–10.8)

## 2023-12-15 PROCEDURE — 94729 DIFFUSING CAPACITY: CPT

## 2023-12-15 PROCEDURE — 80048 BASIC METABOLIC PNL TOTAL CA: CPT

## 2023-12-15 PROCEDURE — 78815 PET IMAGE W/CT SKULL-THIGH: CPT

## 2023-12-15 PROCEDURE — 94726 PLETHYSMOGRAPHY LUNG VOLUMES: CPT

## 2023-12-15 PROCEDURE — 82948 REAGENT STRIP/BLOOD GLUCOSE: CPT

## 2023-12-15 PROCEDURE — 36415 COLL VENOUS BLD VENIPUNCTURE: CPT

## 2023-12-15 PROCEDURE — 71250 CT THORAX DX C-: CPT

## 2023-12-15 PROCEDURE — 0 FLUDEOXYGLUCOSE F18 SOLUTION: Performed by: NURSE PRACTITIONER

## 2023-12-15 PROCEDURE — A9552 F18 FDG: HCPCS | Performed by: NURSE PRACTITIONER

## 2023-12-15 PROCEDURE — 85027 COMPLETE CBC AUTOMATED: CPT

## 2023-12-15 PROCEDURE — 93005 ELECTROCARDIOGRAM TRACING: CPT

## 2023-12-15 PROCEDURE — 94060 EVALUATION OF WHEEZING: CPT

## 2023-12-15 RX ORDER — ALBUTEROL SULFATE 2.5 MG/3ML
2.5 SOLUTION RESPIRATORY (INHALATION) ONCE AS NEEDED
Status: COMPLETED | OUTPATIENT
Start: 2023-12-15 | End: 2023-12-15

## 2023-12-15 RX ADMIN — ALBUTEROL SULFATE 2.5 MG: 2.5 SOLUTION RESPIRATORY (INHALATION) at 08:57

## 2023-12-15 RX ADMIN — FLUDEOXYGLUCOSE F 18 1 DOSE: 200 INJECTION, SOLUTION INTRAVENOUS at 10:15

## 2023-12-15 NOTE — DISCHARGE INSTRUCTIONS
Arrive to hospital on your day of surgery at 830AM    Take the following medications the morning of surgery with a small sip of water:  PAXIL      If you are on prescription narcotic pain medication to control your pain you may also take that medication the morning of surgery.    General Instructions:  Do not eat or drink anything after midnight the night before surgery.  Infants may have breast milk up to four hours before surgery.  Infants drinking formula may drink formula up to six hours before surgery.   Patients who avoid smoking, chewing tobacco and alcohol for 4 weeks prior to surgery have a reduced risk of post-operative complications.  Quit smoking as many days before surgery as you can.  Do not smoke, use chewing tobacco or drink alcohol the day of surgery.   If applicable bring your C-PAP/ BI-PAP machine in with you to preop day of surgery.  Bring any papers given to you in the doctor’s office.  Wear clean comfortable clothes.  Do not wear contact lenses, false eyelashes or make-up.  Bring a case for your glasses.   Bring crutches or walker if applicable.  Remove all piercings.  Leave jewelry and any other valuables at home.  Hair extensions with metal clips must be removed prior to surgery.  The Pre-Admission Testing nurse will instruct you to bring medications if unable to obtain an accurate list in Pre-Admission Testing.        If you were given a blood bank ID arm band remember to bring it with you the day of surgery.    Preventing a Surgical Site Infection:  For 2 to 3 days before surgery, avoid shaving with a razor because the razor can irritate skin and make it easier to develop an infection.    Any areas of open skin can increase the risk of a post-operative wound infection by allowing bacteria to enter and travel throughout the body.  Notify your surgeon if you have any skin wounds / rashes even if it is not near the expected surgical site.  The area will need assessed to determine if surgery  should be delayed until it is healed.  The night prior to surgery shower using a fresh bar of anti-bacterial soap (such as Dial) and clean washcloth.  Sleep in a clean bed with clean clothing.  Do not allow pets to sleep with you.  Shower on the morning of surgery using a fresh bar of anti-bacterial soap (such as Dial) and clean washcloth.  Dry with a clean towel and dress in clean clothing.  Ask your surgeon if you will be receiving antibiotics prior to surgery.  Make sure you, your family, and all healthcare providers clean their hands with soap and water or an alcohol based hand  before caring for you or your wound.    Day of surgery:  Your arrival time is approximately two hours before your scheduled surgery time.  Upon arrival, a Pre-op nurse and Anesthesiologist will review your health history, obtain vital signs, and answer questions you may have.  The only belongings needed at this time will be your home medications and if applicable your C-PAP/BI-PAP machine.  A Pre-op nurse will start an IV and you may receive medication in preparation for surgery, including something to help you relax.      Please be aware that surgery does come with discomfort.  We want to make every effort to control your discomfort so please discuss any uncontrolled symptoms with your nurse.   Your doctor will most likely have prescribed pain medications.      If you are going home after surgery you will receive individualized written care instructions before being discharged.  A responsible adult must drive you to and from the hospital on the day of your surgery and stay with you for 24 hours.  Discharge prescriptions can be filled by the hospital pharmacy during regular pharmacy hours.  If you are having surgery late in the day/evening your prescription may be e-prescribed to your pharmacy.  Please verify your pharmacy hours or chose a 24 hour pharmacy to avoid not having access to your prescription because your pharmacy has  closed for the day.    If you are staying overnight following surgery, you will be transported to your hospital room following the recovery period.  [facility] has all private rooms.    If you have any questions please call Pre-Admission Testing at [phone].  Deductibles and co-payments are collected on the day of service. Please be prepared to pay the required co-pay, deductible or deposit on the day of service as defined by your plan.    Call your surgeon immediately if you experience any of the following symptoms:  Sore Throat  Shortness of Breath or difficulty breathing  Cough  Chills  Body soreness or muscle pain  Headache  Fever  New loss of taste or smell  Do not arrive for your surgery ill.  Your procedure will need to be rescheduled to another time.  You will need to call your physician before the day of surgery to avoid any unnecessary exposure to hospital staff as well as other patients.          CHLORHEXIDINE CLOTH INSTRUCTIONS  The morning of surgery follow these instructions using the Chlorhexidine cloths you've been given.  These steps reduce bacteria on the body.  Do not use the cloths near your eyes, ears mouth, genitalia or on open wounds.  Throw the cloths away after use but do not try to flush them down a toilet.      Open and remove one cloth at a time from the package.    Leave the cloth unfolded and begin the bathing.  Massage the skin with the cloths using gentle pressure to remove bacteria.  Do not scrub harshly.   Follow the steps below with one 2% CHG cloth per area (6 total cloths).  One cloth for neck, shoulders and chest.  One cloth for both arms, hands, fingers and underarms (do underarms last).  One cloth for the abdomen followed by groin.  One cloth for right leg and foot including between the toes.  One cloth for left leg and foot including between the toes.  The last cloth is to be used for the back of the neck, back and buttocks.    Allow the CHG to air dry 3  minutes on the skin which will give it time to work and decrease the chance of irritation.  The skin may feel sticky until it is dry.  Do not rinse with water or any other liquid or you will lose the beneficial effects of the CHG.  If mild skin irritation occurs, do rinse the skin to remove the CHG.  Report this to the nurse at time of admission.  Do not apply lotions, creams, ointments, deodorants or perfumes after using the clothes. Dress in clean clothes before coming to the hospital.

## 2023-12-18 ENCOUNTER — APPOINTMENT (OUTPATIENT)
Dept: GENERAL RADIOLOGY | Facility: HOSPITAL | Age: 68
End: 2023-12-18
Payer: MEDICARE

## 2023-12-18 ENCOUNTER — HOSPITAL ENCOUNTER (OUTPATIENT)
Facility: HOSPITAL | Age: 68
Setting detail: HOSPITAL OUTPATIENT SURGERY
Discharge: HOME | End: 2023-12-18
Attending: STUDENT IN AN ORGANIZED HEALTH CARE EDUCATION/TRAINING PROGRAM | Admitting: STUDENT IN AN ORGANIZED HEALTH CARE EDUCATION/TRAINING PROGRAM
Payer: MEDICARE

## 2023-12-18 ENCOUNTER — ANESTHESIA EVENT (OUTPATIENT)
Dept: GASTROENTEROLOGY | Facility: HOSPITAL | Age: 68
End: 2023-12-18
Payer: MEDICARE

## 2023-12-18 ENCOUNTER — ANESTHESIA (OUTPATIENT)
Dept: GASTROENTEROLOGY | Facility: HOSPITAL | Age: 68
End: 2023-12-18
Payer: MEDICARE

## 2023-12-18 VITALS
WEIGHT: 151 LBS | HEART RATE: 61 BPM | OXYGEN SATURATION: 95 % | HEIGHT: 63 IN | BODY MASS INDEX: 26.75 KG/M2 | RESPIRATION RATE: 14 BRPM | SYSTOLIC BLOOD PRESSURE: 169 MMHG | DIASTOLIC BLOOD PRESSURE: 95 MMHG

## 2023-12-18 DIAGNOSIS — R91.1 SOLITARY PULMONARY NODULE: ICD-10-CM

## 2023-12-18 PROCEDURE — 87116 MYCOBACTERIA CULTURE: CPT | Performed by: STUDENT IN AN ORGANIZED HEALTH CARE EDUCATION/TRAINING PROGRAM

## 2023-12-18 PROCEDURE — 25010000002 PROPOFOL 10 MG/ML EMULSION: Performed by: ANESTHESIOLOGY

## 2023-12-18 PROCEDURE — 88173 CYTOPATH EVAL FNA REPORT: CPT | Performed by: STUDENT IN AN ORGANIZED HEALTH CARE EDUCATION/TRAINING PROGRAM

## 2023-12-18 PROCEDURE — 31627 NAVIGATIONAL BRONCHOSCOPY: CPT | Performed by: STUDENT IN AN ORGANIZED HEALTH CARE EDUCATION/TRAINING PROGRAM

## 2023-12-18 PROCEDURE — 88305 TISSUE EXAM BY PATHOLOGIST: CPT | Performed by: STUDENT IN AN ORGANIZED HEALTH CARE EDUCATION/TRAINING PROGRAM

## 2023-12-18 PROCEDURE — 88333 PATH CONSLTJ SURG CYTO XM 1: CPT | Performed by: STUDENT IN AN ORGANIZED HEALTH CARE EDUCATION/TRAINING PROGRAM

## 2023-12-18 PROCEDURE — 71045 X-RAY EXAM CHEST 1 VIEW: CPT

## 2023-12-18 PROCEDURE — 31629 BRONCHOSCOPY/NEEDLE BX EACH: CPT | Performed by: STUDENT IN AN ORGANIZED HEALTH CARE EDUCATION/TRAINING PROGRAM

## 2023-12-18 PROCEDURE — 87206 SMEAR FLUORESCENT/ACID STAI: CPT | Performed by: STUDENT IN AN ORGANIZED HEALTH CARE EDUCATION/TRAINING PROGRAM

## 2023-12-18 PROCEDURE — 31624 DX BRONCHOSCOPE/LAVAGE: CPT | Performed by: STUDENT IN AN ORGANIZED HEALTH CARE EDUCATION/TRAINING PROGRAM

## 2023-12-18 PROCEDURE — 25010000002 SUGAMMADEX 200 MG/2ML SOLUTION: Performed by: ANESTHESIOLOGY

## 2023-12-18 PROCEDURE — 87071 CULTURE AEROBIC QUANT OTHER: CPT | Performed by: STUDENT IN AN ORGANIZED HEALTH CARE EDUCATION/TRAINING PROGRAM

## 2023-12-18 PROCEDURE — 87205 SMEAR GRAM STAIN: CPT | Performed by: STUDENT IN AN ORGANIZED HEALTH CARE EDUCATION/TRAINING PROGRAM

## 2023-12-18 PROCEDURE — 25010000002 SUCCINYLCHOLINE PER 20 MG: Performed by: ANESTHESIOLOGY

## 2023-12-18 PROCEDURE — 87252 VIRUS INOCULATION TISSUE: CPT | Performed by: STUDENT IN AN ORGANIZED HEALTH CARE EDUCATION/TRAINING PROGRAM

## 2023-12-18 PROCEDURE — 25010000002 HEPARIN (PORCINE) PER 1000 UNITS: Performed by: STUDENT IN AN ORGANIZED HEALTH CARE EDUCATION/TRAINING PROGRAM

## 2023-12-18 PROCEDURE — 88112 CYTOPATH CELL ENHANCE TECH: CPT | Performed by: STUDENT IN AN ORGANIZED HEALTH CARE EDUCATION/TRAINING PROGRAM

## 2023-12-18 PROCEDURE — 76000 FLUOROSCOPY <1 HR PHYS/QHP: CPT

## 2023-12-18 RX ORDER — LIDOCAINE HYDROCHLORIDE 20 MG/ML
INJECTION, SOLUTION INFILTRATION; PERINEURAL AS NEEDED
Status: DISCONTINUED | OUTPATIENT
Start: 2023-12-18 | End: 2023-12-18 | Stop reason: SURG

## 2023-12-18 RX ORDER — SODIUM CHLORIDE 9 MG/ML
40 INJECTION, SOLUTION INTRAVENOUS AS NEEDED
Status: DISCONTINUED | OUTPATIENT
Start: 2023-12-18 | End: 2023-12-18 | Stop reason: HOSPADM

## 2023-12-18 RX ORDER — SUCCINYLCHOLINE CHLORIDE 20 MG/ML
INJECTION INTRAMUSCULAR; INTRAVENOUS AS NEEDED
Status: DISCONTINUED | OUTPATIENT
Start: 2023-12-18 | End: 2023-12-18 | Stop reason: SURG

## 2023-12-18 RX ORDER — SODIUM CHLORIDE 0.9 % (FLUSH) 0.9 %
3 SYRINGE (ML) INJECTION EVERY 12 HOURS SCHEDULED
Status: DISCONTINUED | OUTPATIENT
Start: 2023-12-18 | End: 2023-12-18 | Stop reason: HOSPADM

## 2023-12-18 RX ORDER — PROPOFOL 10 MG/ML
VIAL (ML) INTRAVENOUS AS NEEDED
Status: DISCONTINUED | OUTPATIENT
Start: 2023-12-18 | End: 2023-12-18 | Stop reason: SURG

## 2023-12-18 RX ORDER — ROCURONIUM BROMIDE 10 MG/ML
INJECTION, SOLUTION INTRAVENOUS AS NEEDED
Status: DISCONTINUED | OUTPATIENT
Start: 2023-12-18 | End: 2023-12-18 | Stop reason: SURG

## 2023-12-18 RX ORDER — HEPARIN SODIUM 5000 [USP'U]/ML
5000 INJECTION, SOLUTION INTRAVENOUS; SUBCUTANEOUS ONCE
Status: COMPLETED | OUTPATIENT
Start: 2023-12-18 | End: 2023-12-18

## 2023-12-18 RX ORDER — SODIUM CHLORIDE 0.9 % (FLUSH) 0.9 %
3-10 SYRINGE (ML) INJECTION AS NEEDED
Status: DISCONTINUED | OUTPATIENT
Start: 2023-12-18 | End: 2023-12-18 | Stop reason: HOSPADM

## 2023-12-18 RX ADMIN — ROCURONIUM BROMIDE 45 MG: 10 INJECTION, SOLUTION INTRAVENOUS at 10:04

## 2023-12-18 RX ADMIN — LIDOCAINE HYDROCHLORIDE 100 MG: 20 INJECTION, SOLUTION INFILTRATION; PERINEURAL at 09:52

## 2023-12-18 RX ADMIN — SUCCINYLCHOLINE CHLORIDE 200 MG: 20 INJECTION, SOLUTION INTRAMUSCULAR; INTRAVENOUS; PARENTERAL at 09:55

## 2023-12-18 RX ADMIN — SUGAMMADEX 400 MG: 100 INJECTION, SOLUTION INTRAVENOUS at 10:52

## 2023-12-18 RX ADMIN — HEPARIN SODIUM 5000 UNITS: 5000 INJECTION INTRAVENOUS; SUBCUTANEOUS at 08:52

## 2023-12-18 RX ADMIN — PROPOFOL 200 MG: 10 INJECTION, EMULSION INTRAVENOUS at 09:53

## 2023-12-18 RX ADMIN — ROCURONIUM BROMIDE 5 MG: 10 INJECTION, SOLUTION INTRAVENOUS at 09:52

## 2023-12-18 RX ADMIN — PROPOFOL 300 MCG/KG/MIN: 10 INJECTION, EMULSION INTRAVENOUS at 09:58

## 2023-12-18 NOTE — OP NOTE
Operative Note     Date of procedure: 12/18/2023     Patient name: Bernadette Perez  MRN: [id number]    Pre OP diagnosis:  Enlarging right lower lobe pulmonary nodule    Patient Active Problem List   Diagnosis    Arthritis    Allergic rhinitis    Hx of colonic polyps    Family history of diabetes mellitus type II    Family history of heart disease    Hyperlipidemia    Elevated liver enzymes    Gastroesophageal reflux disease    Depression with anxiety    Primary hypertension    Melanoma    Solitary pulmonary nodule       Post OP diagnosis:  Same    Procedure performed:   Flexible bronchoscopy.  Navigational bronchoscopy with ION robot with C-arm.  Interpretation of fluoroscopy images.  Radial endobronchial ultrasound.  Fine-needle aspiration of the right lower lobe nodule.  Right lower lobe bronchoalveolar lavage.    Indications:   Enlarging right lower lobe pulmonary nodule.  Nodule is photopenic on PET scan.       Surgeon: Robinson Epperson MD, PhD    Assistants: No qualified assistant was available for this procedure.      Anesthesia: General endotracheal anesthesia      Procedure Details   On 12/18/2023, the patient was brought to the operating room and placed in the supine position on the operating room table. Following an uneventful induction of general anesthesia, patient was intubated with a single endotracheal tube without incident.  Antibiotic for surgical prophylaxis was not indicated due to the nature of the procedure.  Prior to beginning the operation, a time-out was conducted with all members of surgical team present. The patient was identified as .name, the procedure and the correct site were verified.     I began by performing flexible bronchoscopy.  A flexible adult bronchoscope was advanced through the endotracheal tube.  A complete examination of the distal trachea and bilateral mainstem and lobar bronchi and all segmental bronchial orifices was performed.  The patient has normal endobronchial  anatomy.  All the tracheobronchial tree had minimal mucus secretion.  There was no blood, endoluminal lesions or other abnormal findings.  The bronchoscope was removed.    Prior to the procedure, the patient CT scan was integrated into the [employer] interface that generated the patient's 3D airway tree.  The target nodule was identified and its anatomical borders were mapped.  A path to the target nodule was generated through the [employer]Point interface.  After the plan was finalized, the patient image and plan guide was transferred to the [employer] robotic platform.  Using the Ion's controller, the 3.5 mm robotic catheter was advanced with the Ion's vision probe and navigated to the target along the preplanned path.  The catheter was parked next to the target lesion.  The Ion vision probe was removed and radial EBUS was used to confirm the presence of the target lesion.  The radial EBUS was removed and under fluoroscopic guidance, a 21 gauge Ion’s Flexision needle was passed into the robotic catheter.  The needle was advanced into the target lesion and the location of the needle was confirmed with the C-arm. Multiple passes of the needle were made into the target lesion and the aspirate was sent for Rapid on Site Evaluation (DUDLEY).  I then attempted biopsy forceps of this area.  I was unable to successfully get the forceps passed the working channel without the scope moving locations.  This was likely due to the exit angle.  I attempted this multiple times but was then able to get a biopsy with a biopsy forceps in this location.  The bronchioloalveolar lavage was performed in the segmental bronchi.  The aspirate was sent for cytology and culture.  The robotic catheter was removed and an adult flexible bronchoscope was inserted.  There was no pooling of blood into the bronchial tree.  All tracheobronchial tree were cleared from secretions.    The patient was awakened from anesthesia, was extubated without incident, and was  transported to the Post Anesthesia Care Unit in stable condition.    Findings:  Using 21-gauge needle, fine-needle aspiration of the right lower lobe was performed.  Bronchoalveolar lavage of the right lower lobe was performed.  No evidence of significant active bleeding at the end of the procedure.    Estimated Blood Loss:  Minimal           Drains: None                 Specimens:   ID Type Source Tests Collected by Time   A : RLL FNA Fine Needle Aspirate Lung, Right Lower Lobe FINE NEEDLE ASPIRATION Robinson Epperson MD PhD 12/18/2023 1025   B : RLL BAL Lavage Lung, Right Lower Lobe NON-GYNECOLOGIC CYTOLOGY, VIRUS CULTURE, AFB CULTURE, BAL CULTURE, QUANTITATIVE Robinson Epperson MD PhD 12/18/2023 1026   C : RLL BX Tissue Lung, Right Lower Lobe TISSUE PATHOLOGY EXAM Robinson Epperson MD PhD 12/18/2023 1030              Implants: None           Complications: None           Disposition: PACU - hemodynamically stable.           Condition: Stable     Robinson Epperson MD, PhD

## 2023-12-18 NOTE — DISCHARGE INSTRUCTIONS
For the next 24 hours patient needs to be with a responsible adult.    For 24 hours DO NOT drive, operate machinery, appliances, drink alcohol, make important decisions or sign legal documents.    Start with a light or bland diet if you are feeling sick to your stomach otherwise advance to regular diet as tolerated.    Follow recommendations on procedure report if provided by your doctor.    Call Dr Epperson for problems [phone]    Problems may include but not limited to: large amounts of bleeding, trouble breathing, repeated vomiting, severe unrelieved pain, fever or chills.

## 2023-12-18 NOTE — ANESTHESIA PREPROCEDURE EVALUATION
Anesthesia Evaluation     Patient summary reviewed and Nursing notes reviewed   NPO Solid Status: > 8 hours  NPO Liquid Status: > 8 hours           Airway   Mallampati: II  Neck ROM: full  No difficulty expected  Dental - normal exam     Pulmonary     breath sounds clear to auscultation  Cardiovascular     Rhythm: regular    (+) hypertension, hyperlipidemia      Neuro/Psych  (+) numbness, psychiatric history  GI/Hepatic/Renal/Endo    (+) GERD    Musculoskeletal     Abdominal    Substance History      OB/GYN          Other   arthritis,   history of cancer                Anesthesia Plan    ASA 3     general     intravenous induction     Anesthetic plan, risks, benefits, and alternatives have been provided, discussed and informed consent has been obtained with: patient.    CODE STATUS:

## 2023-12-18 NOTE — ANESTHESIA POSTPROCEDURE EVALUATION
"Patient: Bernadette Perez    Procedure Summary       Date: 12/18/23 Room / Location:  [facility]    Anesthesia Start: 0949 Anesthesia Stop: 1112    Procedure: BRONCHOSCOPY WITH ION ROBOT WITH BAL, BX AND FNA Diagnosis:       Solitary pulmonary nodule      (Solitary pulmonary nodule [R91.1])    Surgeons: Robinson Epperson MD PhD Provider: Chano Cantu MD    Anesthesia Type: general ASA Status: 3            Anesthesia Type: general    Vitals  Vitals Value Taken Time   /95 12/18/23 1140   Temp     Pulse 58 12/18/23 1144   Resp 14 12/18/23 1140   SpO2 94 % 12/18/23 1144   Vitals shown include unfiled device data.        Post Anesthesia Care and Evaluation    Patient location during evaluation: bedside  Patient participation: complete - patient participated  Level of consciousness: sleepy but conscious  Pain score: 0  Pain management: adequate    Airway patency: patent  Anesthetic complications: No anesthetic complications    Cardiovascular status: acceptable  Respiratory status: acceptable  Hydration status: acceptable    Comments: /95 (BP Location: Left arm, Patient Position: Lying)   Pulse 61   Resp 14   Ht 160 cm (63\")   Wt 68.5 kg (151 lb)   SpO2 95%   BMI 26.75 kg/m²       "

## 2023-12-18 NOTE — ANESTHESIA PROCEDURE NOTES
Airway  Urgency: elective    Date/Time: 12/18/2023 9:56 AM  End Time:12/18/2023 9:56 AM  Airway not difficult    General Information and Staff    Patient location during procedure: OR  Anesthesiologist: Chano Cantu MD    Indications and Patient Condition  Indications for airway management: airway protection    Preoxygenated: yes  Mask difficulty assessment: 0 - not attempted    Final Airway Details  Final airway type: endotracheal airway      Successful airway: ETT  Cuffed: yes   Successful intubation technique: direct laryngoscopy  Facilitating devices/methods: intubating stylet  Endotracheal tube insertion site: oral  Blade: Mcmahon  Blade size: 2  ETT size (mm): 8.5  Cormack-Lehane Classification: grade I - full view of glottis  Placement verified by: chest auscultation and capnometry   Number of attempts at approach: 1  Assessment: lips, teeth, and gum same as pre-op and atraumatic intubation    Additional Comments  8.5 OETT tube requested

## 2023-12-19 DIAGNOSIS — F41.8 DEPRESSION WITH ANXIETY: ICD-10-CM

## 2023-12-19 LAB
CYTO UR: NORMAL
DX PRELIMINARY: NORMAL
LAB AP CASE REPORT: NORMAL
LAB AP CASE REPORT: NORMAL
LAB AP NON-GYN SPECIMEN ADEQUACY: NORMAL
PATH REPORT.FINAL DX SPEC: NORMAL
PATH REPORT.FINAL DX SPEC: NORMAL
PATH REPORT.GROSS SPEC: NORMAL
PATH REPORT.GROSS SPEC: NORMAL

## 2023-12-19 RX ORDER — PAROXETINE HYDROCHLORIDE 20 MG/1
20 TABLET, FILM COATED ORAL EVERY MORNING
Qty: 90 TABLET | Refills: 0 | Status: SHIPPED | OUTPATIENT
Start: 2023-12-19

## 2023-12-20 LAB
BACTERIA SPEC AEROBE CULT: NO GROWTH
CYTO UR: NORMAL
GRAM STN SPEC: NORMAL
GRAM STN SPEC: NORMAL
LAB AP CASE REPORT: NORMAL
PATH REPORT.FINAL DX SPEC: NORMAL
PATH REPORT.GROSS SPEC: NORMAL

## 2023-12-21 LAB — VIRUS SPEC CULT: NORMAL

## 2023-12-26 ENCOUNTER — TELEPHONE (OUTPATIENT)
Dept: SURGERY | Facility: CLINIC | Age: 68
End: 2023-12-26
Payer: MEDICARE

## 2023-12-26 LAB — VIRUS SPEC CULT: NORMAL

## 2023-12-27 ENCOUNTER — PREP FOR SURGERY (OUTPATIENT)
Dept: OTHER | Facility: HOSPITAL | Age: 68
End: 2023-12-27
Payer: MEDICARE

## 2023-12-27 ENCOUNTER — OFFICE VISIT (OUTPATIENT)
Dept: SURGERY | Facility: CLINIC | Age: 68
End: 2023-12-27
Payer: MEDICARE

## 2023-12-27 VITALS
BODY MASS INDEX: 26.93 KG/M2 | OXYGEN SATURATION: 97 % | HEIGHT: 63 IN | SYSTOLIC BLOOD PRESSURE: 138 MMHG | WEIGHT: 152 LBS | HEART RATE: 71 BPM | DIASTOLIC BLOOD PRESSURE: 94 MMHG

## 2023-12-27 DIAGNOSIS — R91.1 SOLITARY PULMONARY NODULE: Primary | ICD-10-CM

## 2023-12-27 RX ORDER — GABAPENTIN 300 MG/1
300 CAPSULE ORAL ONCE
OUTPATIENT
Start: 2023-12-27 | End: 2023-12-27

## 2023-12-27 RX ORDER — ACETAMINOPHEN 500 MG
1000 TABLET ORAL ONCE
OUTPATIENT
Start: 2023-12-27 | End: 2023-12-27

## 2023-12-27 RX ORDER — HEPARIN SODIUM 5000 [USP'U]/ML
5000 INJECTION, SOLUTION INTRAVENOUS; SUBCUTANEOUS ONCE
OUTPATIENT
Start: 2023-12-27

## 2023-12-27 RX ORDER — CLINDAMYCIN PHOSPHATE 900 MG/50ML
900 INJECTION, SOLUTION INTRAVENOUS
OUTPATIENT
Start: 2023-12-27

## 2023-12-27 NOTE — H&P (VIEW-ONLY)
"Chief Complaint  Enlarging right lower lobe pulmonary nodule    Subjective        Bernadette Perez presents to [facility] THORACIC SURGERY  History of Present Illness  Ms. Perez is a very pleasant 68-year-old lady who is a never smoker.  She has a right lower lobe pulmonary nodule that underwent a CT-guided biopsy back in March 2023.  She Dioni presented for an interval CT scan of the chest and this nodule had grown from approximately 1.2 cm in size to 1.5 cm in size.  Going back to 2017 this nodule was 7 mm in size so over the last several years it has grown significantly in size.  We most recently underwent an Ion bronchoscopy and biopsy, I am not confident with the results of these biopsies as it was technically very challenging for an Ion bronchoscopy and the final pathology was nondiagnostic.  She presents today to discuss steps moving forward.  She denies any respiratory symptoms.  She denies any fevers chills and or cough.  She denies any hemoptysis.  Objective   Vital Signs:  /94 (BP Location: Left arm, Patient Position: Sitting, Cuff Size: Adult)   Pulse 71   Ht 160 cm (63\")   Wt 68.9 kg (152 lb)   SpO2 97%   BMI 26.93 kg/m²   Estimated body mass index is 26.93 kg/m² as calculated from the following:    Height as of this encounter: 160 cm (63\").    Weight as of this encounter: 68.9 kg (152 lb).               Physical Exam  Constitutional:       Appearance: Normal appearance.   Cardiovascular:      Rate and Rhythm: Normal rate and regular rhythm.      Pulses: Normal pulses.      Heart sounds: Normal heart sounds.   Pulmonary:      Effort: Pulmonary effort is normal.      Breath sounds: Normal breath sounds.   Neurological:      General: No focal deficit present.      Mental Status: She is alert and oriented to person, place, and time.   Psychiatric:         Mood and Affect: Mood normal.         Behavior: Behavior normal.         Thought Content: Thought content normal.         " Judgment: Judgment normal.        Result Review :  Biopsy results are nondiagnostic.  FEV1: 101% predicted  DLCO: 87% predicted           Assessment and Plan   Diagnoses and all orders for this visit:    1. Solitary pulmonary nodule (Primary)    Ms. Perez is a very pleasant 68-year-old lady who presents today with an enlarging right lower lobe pulmonary nodule.  This is grown over the last several years.  Her most recent Ion biopsy was nondiagnostic.  Since his nodule is growing in size, I think it is imperative that we get a tissue diagnosis.  Although it would be technically challenging I think we would be able to undergo a surgical wedge resection of this lesion.  I am going to plan on a robotic assisted right lower lobe wedge resection with possible lobectomy with mediastinal lymph node dissection.  Risk benefits and alternatives have been discussed.  Preoperative orders have been placed.       I spent 40 minutes caring for Bernadette on this date of service. This time includes time spent by me in the following activities:preparing for the visit, reviewing tests, obtaining and/or reviewing a separately obtained history, performing a medically appropriate examination and/or evaluation , counseling and educating the patient/family/caregiver, ordering medications, tests, or procedures, referring and communicating with other health care professionals , documenting information in the medical record, independently interpreting results and communicating that information with the patient/family/caregiver, and care coordination  Follow Up   No follow-ups on file.  Patient was given instructions and counseling regarding her condition or for health maintenance advice. Please see specific information pulled into the AVS if appropriate.

## 2023-12-27 NOTE — PROGRESS NOTES
"Chief Complaint  Enlarging right lower lobe pulmonary nodule    Subjective        Bernadette Perez presents to [facility] THORACIC SURGERY  History of Present Illness  Ms. Perez is a very pleasant 68-year-old lady who is a never smoker.  She has a right lower lobe pulmonary nodule that underwent a CT-guided biopsy back in March 2023.  She Dioni presented for an interval CT scan of the chest and this nodule had grown from approximately 1.2 cm in size to 1.5 cm in size.  Going back to 2017 this nodule was 7 mm in size so over the last several years it has grown significantly in size.  We most recently underwent an Ion bronchoscopy and biopsy, I am not confident with the results of these biopsies as it was technically very challenging for an Ion bronchoscopy and the final pathology was nondiagnostic.  She presents today to discuss steps moving forward.  She denies any respiratory symptoms.  She denies any fevers chills and or cough.  She denies any hemoptysis.  Objective   Vital Signs:  /94 (BP Location: Left arm, Patient Position: Sitting, Cuff Size: Adult)   Pulse 71   Ht 160 cm (63\")   Wt 68.9 kg (152 lb)   SpO2 97%   BMI 26.93 kg/m²   Estimated body mass index is 26.93 kg/m² as calculated from the following:    Height as of this encounter: 160 cm (63\").    Weight as of this encounter: 68.9 kg (152 lb).               Physical Exam  Constitutional:       Appearance: Normal appearance.   Cardiovascular:      Rate and Rhythm: Normal rate and regular rhythm.      Pulses: Normal pulses.      Heart sounds: Normal heart sounds.   Pulmonary:      Effort: Pulmonary effort is normal.      Breath sounds: Normal breath sounds.   Neurological:      General: No focal deficit present.      Mental Status: She is alert and oriented to person, place, and time.   Psychiatric:         Mood and Affect: Mood normal.         Behavior: Behavior normal.         Thought Content: Thought content normal.         " Judgment: Judgment normal.        Result Review :  Biopsy results are nondiagnostic.  FEV1: 101% predicted  DLCO: 87% predicted           Assessment and Plan   Diagnoses and all orders for this visit:    1. Solitary pulmonary nodule (Primary)    Ms. Perez is a very pleasant 68-year-old lady who presents today with an enlarging right lower lobe pulmonary nodule.  This is grown over the last several years.  Her most recent Ion biopsy was nondiagnostic.  Since his nodule is growing in size, I think it is imperative that we get a tissue diagnosis.  Although it would be technically challenging I think we would be able to undergo a surgical wedge resection of this lesion.  I am going to plan on a robotic assisted right lower lobe wedge resection with possible lobectomy with mediastinal lymph node dissection.  Risk benefits and alternatives have been discussed.  Preoperative orders have been placed.       I spent 40 minutes caring for Bernadette on this date of service. This time includes time spent by me in the following activities:preparing for the visit, reviewing tests, obtaining and/or reviewing a separately obtained history, performing a medically appropriate examination and/or evaluation , counseling and educating the patient/family/caregiver, ordering medications, tests, or procedures, referring and communicating with other health care professionals , documenting information in the medical record, independently interpreting results and communicating that information with the patient/family/caregiver, and care coordination  Follow Up   No follow-ups on file.  Patient was given instructions and counseling regarding her condition or for health maintenance advice. Please see specific information pulled into the AVS if appropriate.

## 2023-12-29 ENCOUNTER — PREP FOR SURGERY (OUTPATIENT)
Dept: OTHER | Facility: HOSPITAL | Age: 68
End: 2023-12-29
Payer: MEDICARE

## 2023-12-29 DIAGNOSIS — Z12.11 SCREENING FOR COLON CANCER: Primary | ICD-10-CM

## 2023-12-29 DIAGNOSIS — Z86.010 HISTORY OF COLONIC POLYPS: ICD-10-CM

## 2024-01-09 ENCOUNTER — PRE-ADMISSION TESTING (OUTPATIENT)
Dept: PREADMISSION TESTING | Facility: HOSPITAL | Age: 69
End: 2024-01-09
Payer: MEDICARE

## 2024-01-09 VITALS
TEMPERATURE: 97.9 F | HEART RATE: 59 BPM | SYSTOLIC BLOOD PRESSURE: 148 MMHG | BODY MASS INDEX: 26.58 KG/M2 | RESPIRATION RATE: 16 BRPM | WEIGHT: 150 LBS | HEIGHT: 63 IN | OXYGEN SATURATION: 96 % | DIASTOLIC BLOOD PRESSURE: 78 MMHG

## 2024-01-09 DIAGNOSIS — R91.1 SOLITARY PULMONARY NODULE: ICD-10-CM

## 2024-01-09 LAB
ANION GAP SERPL CALCULATED.3IONS-SCNC: 13.1 MMOL/L (ref 5–15)
BUN SERPL-MCNC: 13 MG/DL (ref 8–23)
BUN/CREAT SERPL: 14.1 (ref 7–25)
CALCIUM SPEC-SCNC: 9.6 MG/DL (ref 8.6–10.5)
CHLORIDE SERPL-SCNC: 102 MMOL/L (ref 98–107)
CO2 SERPL-SCNC: 23.9 MMOL/L (ref 22–29)
CREAT SERPL-MCNC: 0.92 MG/DL (ref 0.57–1)
DEPRECATED RDW RBC AUTO: 38 FL (ref 37–54)
EGFRCR SERPLBLD CKD-EPI 2021: 68.4 ML/MIN/1.73
ERYTHROCYTE [DISTWIDTH] IN BLOOD BY AUTOMATED COUNT: 12.9 % (ref 12.3–15.4)
GLUCOSE SERPL-MCNC: 82 MG/DL (ref 65–99)
HCT VFR BLD AUTO: 37 % (ref 34–46.6)
HGB BLD-MCNC: 12.1 G/DL (ref 12–15.9)
MCH RBC QN AUTO: 26.8 PG (ref 26.6–33)
MCHC RBC AUTO-ENTMCNC: 32.7 G/DL (ref 31.5–35.7)
MCV RBC AUTO: 82 FL (ref 79–97)
PLATELET # BLD AUTO: 377 10*3/MM3 (ref 140–450)
PMV BLD AUTO: 9.5 FL (ref 6–12)
POTASSIUM SERPL-SCNC: 4 MMOL/L (ref 3.5–5.2)
RBC # BLD AUTO: 4.51 10*6/MM3 (ref 3.77–5.28)
SODIUM SERPL-SCNC: 139 MMOL/L (ref 136–145)
WBC NRBC COR # BLD AUTO: 4.62 10*3/MM3 (ref 3.4–10.8)

## 2024-01-09 PROCEDURE — 80048 BASIC METABOLIC PNL TOTAL CA: CPT

## 2024-01-09 PROCEDURE — 85027 COMPLETE CBC AUTOMATED: CPT

## 2024-01-09 PROCEDURE — 36415 COLL VENOUS BLD VENIPUNCTURE: CPT

## 2024-01-09 NOTE — DISCHARGE INSTRUCTIONS
Take the following medications the morning of surgery:    PAXIL    TIME OF ARRIVAL 10:00      If you are on prescription narcotic pain medication to control your pain you may also take that medication the morning of surgery.    General Instructions:  Do not eat solid food after midnight the night before surgery.  You may drink clear liquids day of surgery but must stop at least one hour before your hospital arrival time.  It is beneficial for you to have a clear drink that contains carbohydrates the day of surgery.  We suggest a 12 to 20 ounce bottle of Gatorade or Powerade for non-diabetic patients or a 12 to 20 ounce bottle of G2 or Powerade Zero for diabetic patients. (Pediatric patients, are not advised to drink a 12 to 20 ounce carbohydrate drink)    Clear liquids are liquids you can see through.  Nothing red in color.     Plain water                               Sports drinks  Sodas                                   Gelatin (Jell-O)  Fruit juices without pulp such as white grape juice and apple juice  Popsicles that contain no fruit or yogurt  Tea or coffee (no cream or milk added)  Gatorade / Powerade  G2 / Powerade Zero    Infants may have breast milk up to four hours before surgery.  Infants drinking formula may drink formula up to six hours before surgery.   Patients who avoid smoking, chewing tobacco and alcohol for 4 weeks prior to surgery have a reduced risk of post-operative complications.  Quit smoking as many days before surgery as you can.  Do not smoke, use chewing tobacco or drink alcohol the day of surgery.   If applicable bring your C-PAP/ BI-PAP machine in with you to preop day of surgery.  Bring any papers given to you in the doctor’s office.  Wear clean comfortable clothes.  Do not wear contact lenses, false eyelashes or make-up.  Bring a case for your glasses.   Bring crutches or walker if applicable.  Remove all piercings.  Leave jewelry and any other valuables at home.  Hair extensions  with metal clips must be removed prior to surgery.  The Pre-Admission Testing nurse will instruct you to bring medications if unable to obtain an accurate list in Pre-Admission Testing.          Preventing a Surgical Site Infection:  For 2 to 3 days before surgery, avoid shaving with a razor because the razor can irritate skin and make it easier to develop an infection.    Any areas of open skin can increase the risk of a post-operative wound infection by allowing bacteria to enter and travel throughout the body.  Notify your surgeon if you have any skin wounds / rashes even if it is not near the expected surgical site.  The area will need assessed to determine if surgery should be delayed until it is healed.  The night prior to surgery shower using a fresh bar of anti-bacterial soap (such as Dial) and clean washcloth.  Sleep in a clean bed with clean clothing.  Do not allow pets to sleep with you.  Shower on the morning of surgery using a fresh bar of anti-bacterial soap (such as Dial) and clean washcloth.  Dry with a clean towel and dress in clean clothing.  Ask your surgeon if you will be receiving antibiotics prior to surgery.  Make sure you, your family, and all healthcare providers clean their hands with soap and water or an alcohol based hand  before caring for you or your wound.    Day of surgery:  Your arrival time is approximately two hours before your scheduled surgery time.  Upon arrival, a Pre-op nurse and Anesthesiologist will review your health history, obtain vital signs, and answer questions you may have.  The only belongings needed at this time will be a list of your home medications and if applicable your C-PAP/BI-PAP machine.  A Pre-op nurse will start an IV and you may receive medication in preparation for surgery, including something to help you relax.     Please be aware that surgery does come with discomfort.  We want to make every effort to control your discomfort so please discuss  any uncontrolled symptoms with your nurse.   Your doctor will most likely have prescribed pain medications.      If you are going home after surgery you will receive individualized written care instructions before being discharged.  A responsible adult must drive you to and from the hospital on the day of your surgery and stay with you for 24 hours.  Discharge prescriptions can be filled by the hospital pharmacy during regular pharmacy hours.  If you are having surgery late in the day/evening your prescription may be e-prescribed to your pharmacy.  Please verify your pharmacy hours or chose a 24 hour pharmacy to avoid not having access to your prescription because your pharmacy has closed for the day.    If you are staying overnight following surgery, you will be transported to your hospital room following the recovery period.  [facility] has all private rooms.    If you have any questions please call Pre-Admission Testing at [phone].  Deductibles and co-payments are collected on the day of service. Please be prepared to pay the required co-pay, deductible or deposit on the day of service as defined by your plan.    Call your surgeon immediately if you experience any of the following symptoms:  Sore Throat  Shortness of Breath or difficulty breathing  Cough  Chills  Body soreness or muscle pain  Headache  Fever  New loss of taste or smell  Do not arrive for your surgery ill.  Your procedure will need to be rescheduled to another time.  You will need to call your physician before the day of surgery to avoid any unnecessary exposure to hospital staff as well as other patients.  CHLORHEXIDINE CLOTH INSTRUCTIONS  The morning of surgery follow these instructions using the Chlorhexidine cloths you've been given.  These steps reduce bacteria on the body.  Do not use the cloths near your eyes, ears mouth, genitalia or on open wounds.  Throw the cloths away after use but do not try to flush them down a  toilet.      Open and remove one cloth at a time from the package.    Leave the cloth unfolded and begin the bathing.  Massage the skin with the cloths using gentle pressure to remove bacteria.  Do not scrub harshly.   Follow the steps below with one 2% CHG cloth per area (6 total cloths).  One cloth for neck, shoulders and chest.  One cloth for both arms, hands, fingers and underarms (do underarms last).  One cloth for the abdomen followed by groin.  One cloth for right leg and foot including between the toes.  One cloth for left leg and foot including between the toes.  The last cloth is to be used for the back of the neck, back and buttocks.    Allow the CHG to air dry 3 minutes on the skin which will give it time to work and decrease the chance of irritation.  The skin may feel sticky until it is dry.  Do not rinse with water or any other liquid or you will lose the beneficial effects of the CHG.  If mild skin irritation occurs, do rinse the skin to remove the CHG.  Report this to the nurse at time of admission.  Do not apply lotions, creams, ointments, deodorants or perfumes after using the clothes. Dress in clean clothes before coming to the hospital.

## 2024-01-12 DIAGNOSIS — I10 PRIMARY HYPERTENSION: ICD-10-CM

## 2024-01-12 RX ORDER — LOSARTAN POTASSIUM 50 MG/1
TABLET ORAL
Qty: 90 TABLET | Refills: 1 | Status: ON HOLD | OUTPATIENT
Start: 2024-01-12

## 2024-01-15 ENCOUNTER — ANESTHESIA EVENT (OUTPATIENT)
Dept: PERIOP | Facility: HOSPITAL | Age: 69
End: 2024-01-15
Payer: MEDICARE

## 2024-01-15 ENCOUNTER — APPOINTMENT (OUTPATIENT)
Dept: GENERAL RADIOLOGY | Facility: HOSPITAL | Age: 69
End: 2024-01-15
Payer: MEDICARE

## 2024-01-15 ENCOUNTER — HOSPITAL ENCOUNTER (INPATIENT)
Facility: HOSPITAL | Age: 69
LOS: 3 days | Discharge: HOME | End: 2024-01-18
Attending: STUDENT IN AN ORGANIZED HEALTH CARE EDUCATION/TRAINING PROGRAM | Admitting: STUDENT IN AN ORGANIZED HEALTH CARE EDUCATION/TRAINING PROGRAM
Payer: MEDICARE

## 2024-01-15 ENCOUNTER — ANESTHESIA (OUTPATIENT)
Dept: PERIOP | Facility: HOSPITAL | Age: 69
End: 2024-01-15
Payer: MEDICARE

## 2024-01-15 DIAGNOSIS — R09.02 HYPOXIA: ICD-10-CM

## 2024-01-15 DIAGNOSIS — C34.91 NON-SMALL CELL CANCER OF RIGHT LUNG: Primary | ICD-10-CM

## 2024-01-15 DIAGNOSIS — R91.1 SOLITARY PULMONARY NODULE: ICD-10-CM

## 2024-01-15 PROBLEM — C34.90 NON-SMALL CELL LUNG CANCER: Status: ACTIVE | Noted: 2024-01-15

## 2024-01-15 LAB
ABO GROUP BLD: NORMAL
ANION GAP SERPL CALCULATED.3IONS-SCNC: 12.6 MMOL/L (ref 5–15)
BLD GP AB SCN SERPL QL: NEGATIVE
BUN SERPL-MCNC: 9 MG/DL (ref 8–23)
BUN/CREAT SERPL: 12.7 (ref 7–25)
CALCIUM SPEC-SCNC: 8.6 MG/DL (ref 8.6–10.5)
CHLORIDE SERPL-SCNC: 104 MMOL/L (ref 98–107)
CO2 SERPL-SCNC: 23.4 MMOL/L (ref 22–29)
CREAT SERPL-MCNC: 0.71 MG/DL (ref 0.57–1)
DEPRECATED RDW RBC AUTO: 43 FL (ref 37–54)
EGFRCR SERPLBLD CKD-EPI 2021: 93.3 ML/MIN/1.73
ERYTHROCYTE [DISTWIDTH] IN BLOOD BY AUTOMATED COUNT: 13.5 % (ref 12.3–15.4)
GLUCOSE SERPL-MCNC: 144 MG/DL (ref 65–99)
HCT VFR BLD AUTO: 34.9 % (ref 34–46.6)
HGB BLD-MCNC: 11.6 G/DL (ref 12–15.9)
MCH RBC QN AUTO: 28.6 PG (ref 26.6–33)
MCHC RBC AUTO-ENTMCNC: 33.2 G/DL (ref 31.5–35.7)
MCV RBC AUTO: 86.2 FL (ref 79–97)
PLATELET # BLD AUTO: 344 10*3/MM3 (ref 140–450)
PMV BLD AUTO: 9.7 FL (ref 6–12)
POTASSIUM SERPL-SCNC: 4.1 MMOL/L (ref 3.5–5.2)
RBC # BLD AUTO: 4.05 10*6/MM3 (ref 3.77–5.28)
RH BLD: POSITIVE
SODIUM SERPL-SCNC: 140 MMOL/L (ref 136–145)
T&S EXPIRATION DATE: NORMAL
WBC NRBC COR # BLD AUTO: 11.94 10*3/MM3 (ref 3.4–10.8)

## 2024-01-15 PROCEDURE — 25010000002 CLINDAMYCIN 900 MG/50ML SOLUTION: Performed by: STUDENT IN AN ORGANIZED HEALTH CARE EDUCATION/TRAINING PROGRAM

## 2024-01-15 PROCEDURE — 25010000002 ONDANSETRON PER 1 MG: Performed by: NURSE ANESTHETIST, CERTIFIED REGISTERED

## 2024-01-15 PROCEDURE — C9290 INJ, BUPIVACAINE LIPOSOME: HCPCS | Performed by: STUDENT IN AN ORGANIZED HEALTH CARE EDUCATION/TRAINING PROGRAM

## 2024-01-15 PROCEDURE — 88313 SPECIAL STAINS GROUP 2: CPT | Performed by: STUDENT IN AN ORGANIZED HEALTH CARE EDUCATION/TRAINING PROGRAM

## 2024-01-15 PROCEDURE — 88342 IMHCHEM/IMCYTCHM 1ST ANTB: CPT | Performed by: STUDENT IN AN ORGANIZED HEALTH CARE EDUCATION/TRAINING PROGRAM

## 2024-01-15 PROCEDURE — 25010000002 PROPOFOL 10 MG/ML EMULSION: Performed by: NURSE ANESTHETIST, CERTIFIED REGISTERED

## 2024-01-15 PROCEDURE — 80048 BASIC METABOLIC PNL TOTAL CA: CPT | Performed by: STUDENT IN AN ORGANIZED HEALTH CARE EDUCATION/TRAINING PROGRAM

## 2024-01-15 PROCEDURE — 32674 THORACOSCOPY LYMPH NODE EXC: CPT | Performed by: STUDENT IN AN ORGANIZED HEALTH CARE EDUCATION/TRAINING PROGRAM

## 2024-01-15 PROCEDURE — 25010000002 FENTANYL CITRATE (PF) 50 MCG/ML SOLUTION: Performed by: ANESTHESIOLOGY

## 2024-01-15 PROCEDURE — 0BTF4ZZ RESECTION OF RIGHT LOWER LUNG LOBE, PERCUTANEOUS ENDOSCOPIC APPROACH: ICD-10-PCS | Performed by: STUDENT IN AN ORGANIZED HEALTH CARE EDUCATION/TRAINING PROGRAM

## 2024-01-15 PROCEDURE — 25010000002 DEXAMETHASONE SODIUM PHOSPHATE 20 MG/5ML SOLUTION: Performed by: NURSE ANESTHETIST, CERTIFIED REGISTERED

## 2024-01-15 PROCEDURE — 0BJ08ZZ INSPECTION OF TRACHEOBRONCHIAL TREE, VIA NATURAL OR ARTIFICIAL OPENING ENDOSCOPIC: ICD-10-PCS | Performed by: STUDENT IN AN ORGANIZED HEALTH CARE EDUCATION/TRAINING PROGRAM

## 2024-01-15 PROCEDURE — 25010000002 SUGAMMADEX 200 MG/2ML SOLUTION: Performed by: NURSE ANESTHETIST, CERTIFIED REGISTERED

## 2024-01-15 PROCEDURE — 88305 TISSUE EXAM BY PATHOLOGIST: CPT | Performed by: STUDENT IN AN ORGANIZED HEALTH CARE EDUCATION/TRAINING PROGRAM

## 2024-01-15 PROCEDURE — C9290 INJ, BUPIVACAINE LIPOSOME: HCPCS | Performed by: ANESTHESIOLOGY

## 2024-01-15 PROCEDURE — 88341 IMHCHEM/IMCYTCHM EA ADD ANTB: CPT | Performed by: STUDENT IN AN ORGANIZED HEALTH CARE EDUCATION/TRAINING PROGRAM

## 2024-01-15 PROCEDURE — 71045 X-RAY EXAM CHEST 1 VIEW: CPT

## 2024-01-15 PROCEDURE — 32668 THORACOSCOPY W/W RESECT DIAG: CPT | Performed by: STUDENT IN AN ORGANIZED HEALTH CARE EDUCATION/TRAINING PROGRAM

## 2024-01-15 PROCEDURE — 25010000002 FENTANYL CITRATE (PF) 50 MCG/ML SOLUTION: Performed by: NURSE ANESTHETIST, CERTIFIED REGISTERED

## 2024-01-15 PROCEDURE — 25010000002 PROPOFOL 200 MG/20ML EMULSION: Performed by: NURSE ANESTHETIST, CERTIFIED REGISTERED

## 2024-01-15 PROCEDURE — 25010000002 AZTREONAM PER 500 MG: Performed by: STUDENT IN AN ORGANIZED HEALTH CARE EDUCATION/TRAINING PROGRAM

## 2024-01-15 PROCEDURE — 0 BUPIVACAINE LIPOSOME 1.3 % SUSPENSION: Performed by: ANESTHESIOLOGY

## 2024-01-15 PROCEDURE — 88309 TISSUE EXAM BY PATHOLOGIST: CPT | Performed by: STUDENT IN AN ORGANIZED HEALTH CARE EDUCATION/TRAINING PROGRAM

## 2024-01-15 PROCEDURE — 88312 SPECIAL STAINS GROUP 1: CPT | Performed by: STUDENT IN AN ORGANIZED HEALTH CARE EDUCATION/TRAINING PROGRAM

## 2024-01-15 PROCEDURE — 25010000002 HEPARIN (PORCINE) PER 1000 UNITS: Performed by: STUDENT IN AN ORGANIZED HEALTH CARE EDUCATION/TRAINING PROGRAM

## 2024-01-15 PROCEDURE — 8E0W4CZ ROBOTIC ASSISTED PROCEDURE OF TRUNK REGION, PERCUTANEOUS ENDOSCOPIC APPROACH: ICD-10-PCS | Performed by: STUDENT IN AN ORGANIZED HEALTH CARE EDUCATION/TRAINING PROGRAM

## 2024-01-15 PROCEDURE — 25810000003 SODIUM CHLORIDE PER 500 ML: Performed by: STUDENT IN AN ORGANIZED HEALTH CARE EDUCATION/TRAINING PROGRAM

## 2024-01-15 PROCEDURE — 01580ZZ DESTRUCTION OF THORACIC NERVE, OPEN APPROACH: ICD-10-PCS | Performed by: STUDENT IN AN ORGANIZED HEALTH CARE EDUCATION/TRAINING PROGRAM

## 2024-01-15 PROCEDURE — 86900 BLOOD TYPING SEROLOGIC ABO: CPT | Performed by: STUDENT IN AN ORGANIZED HEALTH CARE EDUCATION/TRAINING PROGRAM

## 2024-01-15 PROCEDURE — C2618 PROBE/NEEDLE, CRYO: HCPCS | Performed by: STUDENT IN AN ORGANIZED HEALTH CARE EDUCATION/TRAINING PROGRAM

## 2024-01-15 PROCEDURE — 07B74ZX EXCISION OF THORAX LYMPHATIC, PERCUTANEOUS ENDOSCOPIC APPROACH, DIAGNOSTIC: ICD-10-PCS | Performed by: STUDENT IN AN ORGANIZED HEALTH CARE EDUCATION/TRAINING PROGRAM

## 2024-01-15 PROCEDURE — 88307 TISSUE EXAM BY PATHOLOGIST: CPT | Performed by: STUDENT IN AN ORGANIZED HEALTH CARE EDUCATION/TRAINING PROGRAM

## 2024-01-15 PROCEDURE — 3E0T3BZ INTRODUCTION OF ANESTHETIC AGENT INTO PERIPHERAL NERVES AND PLEXI, PERCUTANEOUS APPROACH: ICD-10-PCS | Performed by: STUDENT IN AN ORGANIZED HEALTH CARE EDUCATION/TRAINING PROGRAM

## 2024-01-15 PROCEDURE — 25010000002 MIDAZOLAM PER 1 MG: Performed by: ANESTHESIOLOGY

## 2024-01-15 PROCEDURE — 86850 RBC ANTIBODY SCREEN: CPT | Performed by: STUDENT IN AN ORGANIZED HEALTH CARE EDUCATION/TRAINING PROGRAM

## 2024-01-15 PROCEDURE — 88331 PATH CONSLTJ SURG 1 BLK 1SPC: CPT | Performed by: STUDENT IN AN ORGANIZED HEALTH CARE EDUCATION/TRAINING PROGRAM

## 2024-01-15 PROCEDURE — 32663 THORACOSCOPY W/LOBECTOMY: CPT | Performed by: STUDENT IN AN ORGANIZED HEALTH CARE EDUCATION/TRAINING PROGRAM

## 2024-01-15 PROCEDURE — 85027 COMPLETE CBC AUTOMATED: CPT | Performed by: STUDENT IN AN ORGANIZED HEALTH CARE EDUCATION/TRAINING PROGRAM

## 2024-01-15 PROCEDURE — 86901 BLOOD TYPING SEROLOGIC RH(D): CPT | Performed by: STUDENT IN AN ORGANIZED HEALTH CARE EDUCATION/TRAINING PROGRAM

## 2024-01-15 PROCEDURE — 0 BUPIVACAINE LIPOSOME 1.3 % SUSPENSION: Performed by: STUDENT IN AN ORGANIZED HEALTH CARE EDUCATION/TRAINING PROGRAM

## 2024-01-15 PROCEDURE — 25810000003 LACTATED RINGERS PER 1000 ML: Performed by: ANESTHESIOLOGY

## 2024-01-15 PROCEDURE — 25010000002 HYDROMORPHONE PER 4 MG: Performed by: NURSE ANESTHETIST, CERTIFIED REGISTERED

## 2024-01-15 PROCEDURE — C1894 INTRO/SHEATH, NON-LASER: HCPCS | Performed by: STUDENT IN AN ORGANIZED HEALTH CARE EDUCATION/TRAINING PROGRAM

## 2024-01-15 DEVICE — STAPLER 30 RELOAD WHITE
Type: IMPLANTABLE DEVICE | Site: CHEST | Status: FUNCTIONAL
Brand: ENDOWRIST

## 2024-01-15 DEVICE — HORIZON TI SMALL RED 6 CLIPS/CART
Type: IMPLANTABLE DEVICE | Site: CHEST | Status: FUNCTIONAL
Brand: WECK

## 2024-01-15 DEVICE — SUREFORM 45 RELOAD GREEN
Type: IMPLANTABLE DEVICE | Site: CHEST | Status: FUNCTIONAL
Brand: SUREFORM

## 2024-01-15 RX ORDER — DROPERIDOL 2.5 MG/ML
0.62 INJECTION, SOLUTION INTRAMUSCULAR; INTRAVENOUS
Status: DISCONTINUED | OUTPATIENT
Start: 2024-01-15 | End: 2024-01-15 | Stop reason: HOSPADM

## 2024-01-15 RX ORDER — MAGNESIUM HYDROXIDE 1200 MG/15ML
LIQUID ORAL AS NEEDED
Status: DISCONTINUED | OUTPATIENT
Start: 2024-01-15 | End: 2024-01-15 | Stop reason: HOSPADM

## 2024-01-15 RX ORDER — FLUMAZENIL 0.1 MG/ML
0.2 INJECTION INTRAVENOUS AS NEEDED
Status: DISCONTINUED | OUTPATIENT
Start: 2024-01-15 | End: 2024-01-15 | Stop reason: HOSPADM

## 2024-01-15 RX ORDER — OXYCODONE HYDROCHLORIDE 5 MG/1
5 TABLET ORAL EVERY 4 HOURS PRN
Status: DISCONTINUED | OUTPATIENT
Start: 2024-01-15 | End: 2024-01-18 | Stop reason: HOSPADM

## 2024-01-15 RX ORDER — GABAPENTIN 300 MG/1
300 CAPSULE ORAL EVERY 8 HOURS SCHEDULED
Status: DISCONTINUED | OUTPATIENT
Start: 2024-01-15 | End: 2024-01-18 | Stop reason: HOSPADM

## 2024-01-15 RX ORDER — ONDANSETRON 4 MG/1
4 TABLET, ORALLY DISINTEGRATING ORAL EVERY 6 HOURS PRN
Status: DISCONTINUED | OUTPATIENT
Start: 2024-01-15 | End: 2024-01-18 | Stop reason: HOSPADM

## 2024-01-15 RX ORDER — NALOXONE HCL 0.4 MG/ML
0.2 VIAL (ML) INJECTION AS NEEDED
Status: DISCONTINUED | OUTPATIENT
Start: 2024-01-15 | End: 2024-01-15 | Stop reason: HOSPADM

## 2024-01-15 RX ORDER — ONDANSETRON 2 MG/ML
4 INJECTION INTRAMUSCULAR; INTRAVENOUS ONCE AS NEEDED
Status: DISCONTINUED | OUTPATIENT
Start: 2024-01-15 | End: 2024-01-15 | Stop reason: HOSPADM

## 2024-01-15 RX ORDER — BUPIVACAINE HYDROCHLORIDE AND EPINEPHRINE 2.5; 5 MG/ML; UG/ML
INJECTION, SOLUTION EPIDURAL; INFILTRATION; INTRACAUDAL; PERINEURAL
Status: COMPLETED | OUTPATIENT
Start: 2024-01-15 | End: 2024-01-15

## 2024-01-15 RX ORDER — LABETALOL HYDROCHLORIDE 5 MG/ML
5 INJECTION, SOLUTION INTRAVENOUS
Status: DISCONTINUED | OUTPATIENT
Start: 2024-01-15 | End: 2024-01-15 | Stop reason: HOSPADM

## 2024-01-15 RX ORDER — LIDOCAINE HYDROCHLORIDE 10 MG/ML
0.5 INJECTION, SOLUTION INFILTRATION; PERINEURAL ONCE AS NEEDED
Status: DISCONTINUED | OUTPATIENT
Start: 2024-01-15 | End: 2024-01-15 | Stop reason: HOSPADM

## 2024-01-15 RX ORDER — HYDROMORPHONE HYDROCHLORIDE 1 MG/ML
0.5 INJECTION, SOLUTION INTRAMUSCULAR; INTRAVENOUS; SUBCUTANEOUS
Status: DISCONTINUED | OUTPATIENT
Start: 2024-01-15 | End: 2024-01-18 | Stop reason: HOSPADM

## 2024-01-15 RX ORDER — SODIUM CHLORIDE, SODIUM LACTATE, POTASSIUM CHLORIDE, CALCIUM CHLORIDE 600; 310; 30; 20 MG/100ML; MG/100ML; MG/100ML; MG/100ML
9 INJECTION, SOLUTION INTRAVENOUS CONTINUOUS
Status: DISCONTINUED | OUTPATIENT
Start: 2024-01-15 | End: 2024-01-15

## 2024-01-15 RX ORDER — DOCUSATE SODIUM 100 MG/1
100 CAPSULE, LIQUID FILLED ORAL 2 TIMES DAILY
Status: DISCONTINUED | OUTPATIENT
Start: 2024-01-15 | End: 2024-01-18 | Stop reason: HOSPADM

## 2024-01-15 RX ORDER — DIPHENHYDRAMINE HYDROCHLORIDE 50 MG/ML
12.5 INJECTION INTRAMUSCULAR; INTRAVENOUS
Status: DISCONTINUED | OUTPATIENT
Start: 2024-01-15 | End: 2024-01-15 | Stop reason: HOSPADM

## 2024-01-15 RX ORDER — SODIUM CHLORIDE 0.9 % (FLUSH) 0.9 %
3 SYRINGE (ML) INJECTION EVERY 12 HOURS SCHEDULED
Status: DISCONTINUED | OUTPATIENT
Start: 2024-01-15 | End: 2024-01-15 | Stop reason: HOSPADM

## 2024-01-15 RX ORDER — SODIUM CHLORIDE 0.9 % (FLUSH) 0.9 %
3-10 SYRINGE (ML) INJECTION AS NEEDED
Status: DISCONTINUED | OUTPATIENT
Start: 2024-01-15 | End: 2024-01-15 | Stop reason: HOSPADM

## 2024-01-15 RX ORDER — PHENYLEPHRINE HCL IN 0.9% NACL 1 MG/10 ML
SYRINGE (ML) INTRAVENOUS AS NEEDED
Status: DISCONTINUED | OUTPATIENT
Start: 2024-01-15 | End: 2024-01-15 | Stop reason: SURG

## 2024-01-15 RX ORDER — ONDANSETRON 2 MG/ML
4 INJECTION INTRAMUSCULAR; INTRAVENOUS EVERY 6 HOURS PRN
Status: DISCONTINUED | OUTPATIENT
Start: 2024-01-15 | End: 2024-01-18 | Stop reason: HOSPADM

## 2024-01-15 RX ORDER — OXYCODONE AND ACETAMINOPHEN 7.5; 325 MG/1; MG/1
1 TABLET ORAL EVERY 4 HOURS PRN
Status: DISCONTINUED | OUTPATIENT
Start: 2024-01-15 | End: 2024-01-15 | Stop reason: HOSPADM

## 2024-01-15 RX ORDER — EPHEDRINE SULFATE 50 MG/ML
5 INJECTION, SOLUTION INTRAVENOUS ONCE AS NEEDED
Status: DISCONTINUED | OUTPATIENT
Start: 2024-01-15 | End: 2024-01-15 | Stop reason: HOSPADM

## 2024-01-15 RX ORDER — MIDAZOLAM HYDROCHLORIDE 1 MG/ML
0.5 INJECTION INTRAMUSCULAR; INTRAVENOUS
Status: DISCONTINUED | OUTPATIENT
Start: 2024-01-15 | End: 2024-01-15 | Stop reason: HOSPADM

## 2024-01-15 RX ORDER — ACETAMINOPHEN 500 MG
1000 TABLET ORAL 3 TIMES DAILY
Status: DISCONTINUED | OUTPATIENT
Start: 2024-01-15 | End: 2024-01-18 | Stop reason: HOSPADM

## 2024-01-15 RX ORDER — FENTANYL CITRATE 50 UG/ML
INJECTION, SOLUTION INTRAMUSCULAR; INTRAVENOUS AS NEEDED
Status: DISCONTINUED | OUTPATIENT
Start: 2024-01-15 | End: 2024-01-15 | Stop reason: SURG

## 2024-01-15 RX ORDER — HYDROXYZINE HYDROCHLORIDE 25 MG/1
25 TABLET, FILM COATED ORAL 3 TIMES DAILY PRN
Status: DISCONTINUED | OUTPATIENT
Start: 2024-01-15 | End: 2024-01-17

## 2024-01-15 RX ORDER — CLINDAMYCIN PHOSPHATE 900 MG/50ML
900 INJECTION, SOLUTION INTRAVENOUS
Status: COMPLETED | OUTPATIENT
Start: 2024-01-15 | End: 2024-01-15

## 2024-01-15 RX ORDER — DEXAMETHASONE SODIUM PHOSPHATE 4 MG/ML
INJECTION, SOLUTION INTRA-ARTICULAR; INTRALESIONAL; INTRAMUSCULAR; INTRAVENOUS; SOFT TISSUE AS NEEDED
Status: DISCONTINUED | OUTPATIENT
Start: 2024-01-15 | End: 2024-01-15 | Stop reason: SURG

## 2024-01-15 RX ORDER — ATORVASTATIN CALCIUM 20 MG/1
20 TABLET, FILM COATED ORAL NIGHTLY
Status: DISCONTINUED | OUTPATIENT
Start: 2024-01-15 | End: 2024-01-18 | Stop reason: HOSPADM

## 2024-01-15 RX ORDER — PROMETHAZINE HYDROCHLORIDE 25 MG/1
25 SUPPOSITORY RECTAL ONCE AS NEEDED
Status: DISCONTINUED | OUTPATIENT
Start: 2024-01-15 | End: 2024-01-15 | Stop reason: HOSPADM

## 2024-01-15 RX ORDER — FENTANYL CITRATE 50 UG/ML
50 INJECTION, SOLUTION INTRAMUSCULAR; INTRAVENOUS ONCE AS NEEDED
Status: DISCONTINUED | OUTPATIENT
Start: 2024-01-15 | End: 2024-01-15 | Stop reason: HOSPADM

## 2024-01-15 RX ORDER — DEXTROSE MONOHYDRATE, SODIUM CHLORIDE, AND POTASSIUM CHLORIDE 50; 1.49; 4.5 G/1000ML; G/1000ML; G/1000ML
75 INJECTION, SOLUTION INTRAVENOUS CONTINUOUS
Status: DISCONTINUED | OUTPATIENT
Start: 2024-01-15 | End: 2024-01-16

## 2024-01-15 RX ORDER — MIDAZOLAM HYDROCHLORIDE 1 MG/ML
INJECTION INTRAMUSCULAR; INTRAVENOUS
Status: COMPLETED | OUTPATIENT
Start: 2024-01-15 | End: 2024-01-15

## 2024-01-15 RX ORDER — LOSARTAN POTASSIUM 50 MG/1
50 TABLET ORAL DAILY
Status: DISCONTINUED | OUTPATIENT
Start: 2024-01-15 | End: 2024-01-18 | Stop reason: HOSPADM

## 2024-01-15 RX ORDER — PROMETHAZINE HYDROCHLORIDE 25 MG/1
25 TABLET ORAL ONCE AS NEEDED
Status: DISCONTINUED | OUTPATIENT
Start: 2024-01-15 | End: 2024-01-15 | Stop reason: HOSPADM

## 2024-01-15 RX ORDER — NITROGLYCERIN 0.4 MG/1
0.4 TABLET SUBLINGUAL
Status: DISCONTINUED | OUTPATIENT
Start: 2024-01-15 | End: 2024-01-15

## 2024-01-15 RX ORDER — LIDOCAINE HYDROCHLORIDE 20 MG/ML
INJECTION, SOLUTION INFILTRATION; PERINEURAL AS NEEDED
Status: DISCONTINUED | OUTPATIENT
Start: 2024-01-15 | End: 2024-01-15 | Stop reason: SURG

## 2024-01-15 RX ORDER — HYDROCODONE BITARTRATE AND ACETAMINOPHEN 5; 325 MG/1; MG/1
1 TABLET ORAL ONCE AS NEEDED
Status: DISCONTINUED | OUTPATIENT
Start: 2024-01-15 | End: 2024-01-15 | Stop reason: HOSPADM

## 2024-01-15 RX ORDER — NITROGLYCERIN 0.4 MG/1
0.4 TABLET SUBLINGUAL
Status: DISCONTINUED | OUTPATIENT
Start: 2024-01-15 | End: 2024-01-18 | Stop reason: HOSPADM

## 2024-01-15 RX ORDER — PROPOFOL 10 MG/ML
INJECTION, EMULSION INTRAVENOUS AS NEEDED
Status: DISCONTINUED | OUTPATIENT
Start: 2024-01-15 | End: 2024-01-15 | Stop reason: SURG

## 2024-01-15 RX ORDER — DIAZEPAM 2 MG/1
2 TABLET ORAL EVERY 6 HOURS PRN
Status: DISCONTINUED | OUTPATIENT
Start: 2024-01-15 | End: 2024-01-18 | Stop reason: HOSPADM

## 2024-01-15 RX ORDER — ENOXAPARIN SODIUM 100 MG/ML
40 INJECTION SUBCUTANEOUS DAILY
Status: DISCONTINUED | OUTPATIENT
Start: 2024-01-16 | End: 2024-01-18 | Stop reason: HOSPADM

## 2024-01-15 RX ORDER — FENTANYL CITRATE 50 UG/ML
50 INJECTION, SOLUTION INTRAMUSCULAR; INTRAVENOUS
Status: DISCONTINUED | OUTPATIENT
Start: 2024-01-15 | End: 2024-01-15 | Stop reason: HOSPADM

## 2024-01-15 RX ORDER — ONDANSETRON 2 MG/ML
INJECTION INTRAMUSCULAR; INTRAVENOUS AS NEEDED
Status: DISCONTINUED | OUTPATIENT
Start: 2024-01-15 | End: 2024-01-15 | Stop reason: SURG

## 2024-01-15 RX ORDER — SODIUM CHLORIDE 9 MG/ML
INJECTION, SOLUTION INTRAVENOUS AS NEEDED
Status: DISCONTINUED | OUTPATIENT
Start: 2024-01-15 | End: 2024-01-15 | Stop reason: HOSPADM

## 2024-01-15 RX ORDER — GABAPENTIN 300 MG/1
300 CAPSULE ORAL ONCE
Status: COMPLETED | OUTPATIENT
Start: 2024-01-15 | End: 2024-01-15

## 2024-01-15 RX ORDER — FAMOTIDINE 10 MG/ML
20 INJECTION, SOLUTION INTRAVENOUS ONCE
Status: COMPLETED | OUTPATIENT
Start: 2024-01-15 | End: 2024-01-15

## 2024-01-15 RX ORDER — ROCURONIUM BROMIDE 10 MG/ML
INJECTION, SOLUTION INTRAVENOUS AS NEEDED
Status: DISCONTINUED | OUTPATIENT
Start: 2024-01-15 | End: 2024-01-15 | Stop reason: SURG

## 2024-01-15 RX ORDER — HYDROMORPHONE HYDROCHLORIDE 1 MG/ML
0.5 INJECTION, SOLUTION INTRAMUSCULAR; INTRAVENOUS; SUBCUTANEOUS
Status: DISCONTINUED | OUTPATIENT
Start: 2024-01-15 | End: 2024-01-15 | Stop reason: HOSPADM

## 2024-01-15 RX ORDER — HYDRALAZINE HYDROCHLORIDE 20 MG/ML
5 INJECTION INTRAMUSCULAR; INTRAVENOUS
Status: DISCONTINUED | OUTPATIENT
Start: 2024-01-15 | End: 2024-01-15 | Stop reason: HOSPADM

## 2024-01-15 RX ORDER — FENTANYL CITRATE 50 UG/ML
INJECTION, SOLUTION INTRAMUSCULAR; INTRAVENOUS
Status: COMPLETED | OUTPATIENT
Start: 2024-01-15 | End: 2024-01-15

## 2024-01-15 RX ORDER — ACETAMINOPHEN 500 MG
1000 TABLET ORAL ONCE
Status: COMPLETED | OUTPATIENT
Start: 2024-01-15 | End: 2024-01-15

## 2024-01-15 RX ORDER — HEPARIN SODIUM 5000 [USP'U]/ML
5000 INJECTION, SOLUTION INTRAVENOUS; SUBCUTANEOUS ONCE
Status: COMPLETED | OUTPATIENT
Start: 2024-01-15 | End: 2024-01-15

## 2024-01-15 RX ORDER — IPRATROPIUM BROMIDE AND ALBUTEROL SULFATE 2.5; .5 MG/3ML; MG/3ML
3 SOLUTION RESPIRATORY (INHALATION) ONCE AS NEEDED
Status: DISCONTINUED | OUTPATIENT
Start: 2024-01-15 | End: 2024-01-15 | Stop reason: HOSPADM

## 2024-01-15 RX ORDER — PAROXETINE HYDROCHLORIDE 20 MG/1
20 TABLET, FILM COATED ORAL EVERY MORNING
Status: DISCONTINUED | OUTPATIENT
Start: 2024-01-16 | End: 2024-01-18 | Stop reason: HOSPADM

## 2024-01-15 RX ADMIN — ATORVASTATIN CALCIUM 20 MG: 20 TABLET, FILM COATED ORAL at 21:29

## 2024-01-15 RX ADMIN — LOSARTAN POTASSIUM 50 MG: 50 TABLET, FILM COATED ORAL at 21:29

## 2024-01-15 RX ADMIN — Medication 50 MCG: at 12:47

## 2024-01-15 RX ADMIN — HYDROMORPHONE HYDROCHLORIDE 0.5 MG: 1 INJECTION, SOLUTION INTRAMUSCULAR; INTRAVENOUS; SUBCUTANEOUS at 16:38

## 2024-01-15 RX ADMIN — FENTANYL CITRATE 100 MCG: 50 INJECTION, SOLUTION INTRAMUSCULAR; INTRAVENOUS at 11:52

## 2024-01-15 RX ADMIN — CLINDAMYCIN PHOSPHATE 900 MG: 900 INJECTION, SOLUTION INTRAVENOUS at 11:28

## 2024-01-15 RX ADMIN — AZTREONAM 2 G: 2 INJECTION, POWDER, LYOPHILIZED, FOR SOLUTION INTRAMUSCULAR; INTRAVENOUS at 11:26

## 2024-01-15 RX ADMIN — SODIUM CHLORIDE, POTASSIUM CHLORIDE, SODIUM LACTATE AND CALCIUM CHLORIDE 9 ML/HR: 600; 310; 30; 20 INJECTION, SOLUTION INTRAVENOUS at 10:48

## 2024-01-15 RX ADMIN — HYDROMORPHONE HYDROCHLORIDE 0.5 MG: 1 INJECTION, SOLUTION INTRAMUSCULAR; INTRAVENOUS; SUBCUTANEOUS at 15:53

## 2024-01-15 RX ADMIN — PROPOFOL 25 MCG/KG/MIN: 10 INJECTION, EMULSION INTRAVENOUS at 12:15

## 2024-01-15 RX ADMIN — ACETAMINOPHEN 1000 MG: 500 TABLET ORAL at 10:48

## 2024-01-15 RX ADMIN — ROCURONIUM BROMIDE 60 MG: 10 INJECTION, SOLUTION INTRAVENOUS at 11:52

## 2024-01-15 RX ADMIN — ONDANSETRON 4 MG: 2 INJECTION INTRAMUSCULAR; INTRAVENOUS at 14:42

## 2024-01-15 RX ADMIN — SODIUM CHLORIDE, POTASSIUM CHLORIDE, SODIUM LACTATE AND CALCIUM CHLORIDE 9 ML/HR: 600; 310; 30; 20 INJECTION, SOLUTION INTRAVENOUS at 10:49

## 2024-01-15 RX ADMIN — DEXAMETHASONE SODIUM PHOSPHATE 6 MG: 4 INJECTION, SOLUTION INTRAMUSCULAR; INTRAVENOUS at 12:06

## 2024-01-15 RX ADMIN — GABAPENTIN 300 MG: 300 CAPSULE ORAL at 10:48

## 2024-01-15 RX ADMIN — BUPIVACAINE 10 ML: 13.3 INJECTION, SUSPENSION, LIPOSOMAL INFILTRATION at 10:56

## 2024-01-15 RX ADMIN — SUGAMMADEX 200 MG: 100 INJECTION, SOLUTION INTRAVENOUS at 14:58

## 2024-01-15 RX ADMIN — FENTANYL CITRATE 50 MCG: 50 INJECTION, SOLUTION INTRAMUSCULAR; INTRAVENOUS at 16:28

## 2024-01-15 RX ADMIN — BUPIVACAINE HYDROCHLORIDE AND EPINEPHRINE BITARTRATE 20 ML: 2.5; .005 INJECTION, SOLUTION EPIDURAL; INFILTRATION; INTRACAUDAL; PERINEURAL at 10:56

## 2024-01-15 RX ADMIN — HEPARIN SODIUM 5000 UNITS: 5000 INJECTION INTRAVENOUS; SUBCUTANEOUS at 11:27

## 2024-01-15 RX ADMIN — MIDAZOLAM 1 MG: 1 INJECTION INTRAMUSCULAR; INTRAVENOUS at 11:14

## 2024-01-15 RX ADMIN — FENTANYL CITRATE 50 MCG: 50 INJECTION, SOLUTION INTRAMUSCULAR; INTRAVENOUS at 16:18

## 2024-01-15 RX ADMIN — POTASSIUM CHLORIDE, DEXTROSE MONOHYDRATE AND SODIUM CHLORIDE 75 ML/HR: 150; 5; 450 INJECTION, SOLUTION INTRAVENOUS at 18:02

## 2024-01-15 RX ADMIN — HYDROMORPHONE HYDROCHLORIDE 0.5 MG: 1 INJECTION, SOLUTION INTRAMUSCULAR; INTRAVENOUS; SUBCUTANEOUS at 16:03

## 2024-01-15 RX ADMIN — FENTANYL CITRATE 50 MCG: 50 INJECTION, SOLUTION INTRAMUSCULAR; INTRAVENOUS at 10:53

## 2024-01-15 RX ADMIN — FAMOTIDINE 20 MG: 10 INJECTION INTRAVENOUS at 10:48

## 2024-01-15 RX ADMIN — ROCURONIUM BROMIDE 10 MG: 10 INJECTION, SOLUTION INTRAVENOUS at 12:39

## 2024-01-15 RX ADMIN — LIDOCAINE HYDROCHLORIDE 60 MG: 20 INJECTION, SOLUTION INFILTRATION; PERINEURAL at 11:52

## 2024-01-15 RX ADMIN — GABAPENTIN 300 MG: 300 CAPSULE ORAL at 18:02

## 2024-01-15 RX ADMIN — OXYCODONE HYDROCHLORIDE 5 MG: 5 TABLET ORAL at 18:02

## 2024-01-15 RX ADMIN — GABAPENTIN 300 MG: 300 CAPSULE ORAL at 21:29

## 2024-01-15 RX ADMIN — Medication 50 MCG: at 13:15

## 2024-01-15 RX ADMIN — PROPOFOL 150 MG: 10 INJECTION, EMULSION INTRAVENOUS at 11:52

## 2024-01-15 RX ADMIN — ACETAMINOPHEN 1000 MG: 500 TABLET ORAL at 21:29

## 2024-01-15 RX ADMIN — MIDAZOLAM 1 MG: 1 INJECTION INTRAMUSCULAR; INTRAVENOUS at 10:53

## 2024-01-15 NOTE — DISCHARGE INSTRUCTIONS
Post-op VAT / Thoracotomy Discharge Instructions    1. Activity:  · Climb stairs as tolerated  · May drive car after 2 weeks or as directed by surgeon  · Walk at least 3-4 times a day  · Limit lifting for first 6 weeks. No lifting, pushing, or pulling greater than 20 pounds for at least 2 weeks  · Continue to use your incentive spirometer 10x/hour    2. Nutrition:  · Resume previous diet  · Eat well balanced meals  · Avoid constipation by eating fresh fruits, vegetables, whole grain foods and increasing fluid intake.    3. Incision (wound) Care:  · Remove dressing after 48 hours, then leave open to air  · If continued drainage, change dressing every 24 hours and as needed with dry gauze  · May shower after discharge.  · Wash around incision area with soap and water daily. May also cleanse with hydrogen peroxide and/or betadine  · No lotions or creams on incision area. It is normal to have some drainage from your chest tube site. Please keep the area clean and dry.    4. When to call for Medical Advice: [phone]  · Fever greater than 101 degrees  · Unusual or severe pain  · Difficulty breathing  · Incision changes (redness, swelling, or increased purulent drainage)  · Any questions or problems    5. Medication Instructions:  · Take Pain medications as directed to stay comfortable     -Typically Tylenol, Gabapentin, Celebrex (anti-inflamatory), oxycodone as needed. See discharge teaching sheet  · No driving or drinking alcohol when taking prescribed pain meds  · Laxative of choice if needed for constipation.    6. Follow- up appointment:  · We will arrange a follow up appointment in about 2 weeks   Please go to the main hospital for a chest x-ray prior to your appointment

## 2024-01-15 NOTE — OP NOTE
OPERATIVE NOTE     Date of procedure: 1/15/2024     Patient name: Bernadette Perez  MRN: [id number]    Pre OP diagnosis:  Enlarging right lower lobe pulmonary nodule    Patient Active Problem List   Diagnosis    Arthritis    Allergic rhinitis    Hx of colonic polyps    Family history of diabetes mellitus type II    Family history of heart disease    Hyperlipidemia    Elevated liver enzymes    Gastroesophageal reflux disease    Depression with anxiety    Primary hypertension    Melanoma    Solitary pulmonary nodule    Non-small cell lung cancer       Post OP diagnosis:  Non-small cell lung cancer on frozen section.  Final pathology pending.    Procedure performed:   Flexible Bronchoscopy.    Robotic assisted right lower lobe wedge resection  Robot assisted right lower Lobectomy.   Systematic Mediastinal Lymph node dissection.   Intercostal Nerve Block.   Cryo nerve ablation to interspaces 5, 6, 7, and 8    Synoptic portion:  Intent: curative  Surgical procedure performed:  Resection of at least one lobe or bilobectomy - Lobectomy WITH mediastinal lymph node dissection  Mediastinal lymph node stations resected:  7 Subcarinal  Hilar lymph node stations resected:  10 Hilar and 11 Interlobar    Indications:    Ms. Perez is a very pleasant 68-year-old lady who is well-known to the thoracic surgery team.  She had a right lower lobe pulmonary nodule that has slowly increased in size over the last several CT scans.  She underwent image guided and navigational bronchoscopy biopsy of this lesion and both of those results were inconclusive.  She presents today for a minimally invasive right lower lobe wedge resection for diagnosis.  And possible completion lobectomy depending on the intraoperative frozen findings of that wedge resection.  She has sufficient pulmonary reserve for anatomic lung resection.    After discussing the risks and benefits of the procedure, the patient provided informed consent for a Flexible  Bronchoscopy, Robotic assisted right lower lobe wedge resection with possible completion lobectomy, and mediastinal lymph node dissection.    Findings:  No evidence of pleural implants or effusion at the end of the procedure.   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  There was no air leak at the end of procedure.    Surgeon: Robinson Epperson MD, PhD    Assistants:  Assistant: José Velasquez CSA was responsible for performing the following activities: Retraction, Suction, Irrigation, Suturing, Closing, Placing Dressing, and Held/Positioned Camera and their skilled assistance was necessary for the success of this case.    Anesthesia: General endotracheal - Double lumen tube administered by Anesthesiologist: Chano Jett MD; Krystin Landry MD  CRNA: Karla Negron CRNA      Procedure Details   On 1/15/2024, the patient was brought to the operating room and placed supine on the operating table.  Following an uneventful induction of general anesthesia, she was intubated with a double-lumen endotracheal tube without incident.  Appropriate placement was confirmed with the pediatric bronchoscope.  A radial arterial line and additional peripheral IVs were placed by the Anesthesia team. Love catheter was inserted.  Pneumatic compression devices placed to the lower extremities.  Patient was repositioned in the left lateral decubitus position.  The table was jackknifed. All bony prominences were well padded.  The placement of endotracheal tube was confirmed again after positioning with the pediatric bronchoscope. The patient received Ancef for intravenous antibiotic prophylaxis and 5000 units subcutaneous heparin for DVT prophylaxis.  The right chest was prepped and draped in usual sterile fashion.  Prior to beginning the operation, a time-out was conducted with all members of the surgical team present.      Following right lung isolation, I  first made an incision in auscultatory triangle and entered the pleural space with an 8 mm trocar.  This was done approximately in the seventh interspace posteriorly.  I then initiated CO2 insufflation which she tolerated without issue.  I then counted internally and found the seventh interspace.  I then equally spaced 3 more working robotic ports all in the seventh interspace anterior to posterior.  In addition I placed a an assistant port to spaces down in between robotic ports 2 and 3 in the ninth interspace.  After this was done, I then 5 through 10 with 2 cc of Exparel each.  This was then followed by cryo nerve ablation to interspace 5, 6, 7, and 8.   infiltrated the intercostal nerve space on interspace I utilized a tip up, a long bipolar grasper, force bipolar and a 10 millimeters 30 degree scope..   Once this was done, the robot was brought in and the hilum was targeted.  After this, the lung was retracted superiorly and the inferior pulmonary ligament was taken with the long bipolar grasper up to the aspect of the inferior pulmonary vein.  I inspected for a station 9 and station 8 lymph node.  The spaces were opened and there were no station 9 or 8 lymph node noted.  After this, I took down the posterior pleural reflection.  While retracting the lung anteriorly the lesion in question was grossly obvious on the posterior aspect of the lower lobe.  This was elevated and with 3 parenchymal staple fires this lesion was stapled and sent off for frozen section pathology.  While I was waiting for the intraoperative frozen section.  I entered the subcarinal space and dissected free station 7 lymph node.  This was done without issue.  Hemostasis was verified.  I then retracted the lung inferiorly.  She did have an azygos lobe as there was no pleura overlying the azygos vein.  I entered the pleural space between the trachea and the superior vena cava in search of a station 2 and 4R lymph node packets.  She did not  have any lymph node tissue inside of this area and this was dissected completely clean.  After this I allowed her upper lobe to fall in its normal anatomic position and this open the interlobar fissure space.  She had a lymph node in the area of the junction of the middle lobe and lower lobe pulmonary artery.  This lymph node was dissected free and ultimately taken as a 10 R lymph node and sent off for permanent pathology.  This allowed me to dissect directly onto the ongoing PA.  I got on top of the ongoing PA and the plan of Oneil and dissected this posteriorly to the interlobar space between the airway of the upper lobe and bronchus intermedius.  This allowed me to identify the 2 main branches to the lower lobe in regards to the pulmonary artery.  By this time, our intraoperative frozen section came back as mucinous adenocarcinoma.  She had appropriate lung function for anatomic lung resection.  So we continued with a completion lobectomy.  I completed the posterior fissure at this point.  This was done with a green vascular staple fire.  In a similar fashion, I completed the major fissure on the anterior aspect.  I then encircled the superior segment branch of the pulmonary artery with a vessel loop and took this with a vascular staple fire without issue.  In a similar fashion the ongoing pulmonary artery to the lower lobe was encircled with a vessel loop and taken with a vascular staple fire.  After this, the lung was retracted once again superiorly and I encircled the lower lobe vein.  The lower lobe vein was then taken with a vascular staple fire without issue.  She had an additional soft tissue in and around her bronchus this was cleared out with the long bipolar grasper.  And then the lung was retracted superiorly and a parenchymal staple fire was placed across the lower lobe bronchus.  The test inflation occurred and she had good expansion of her middle and upper lobes.  With this the lower lobe bronchus  was taken with a parenchymal staple fire.  All areas were investigated for appropriate hemostasis.  Hemostasis was verified in all of our dissecting field.  The lower lobe was placed in a specimen retrieval bag and this was removed from the most anterior incision.  All robotic ports were then removed.  Hemostasis was verified and all port sites.  I then was able to place a chest tube in the second most anterior incision and this was sutured to the skin with 0 Ethibond suture.  All remaining incisions were closed with first a 2-0 Vicryl and finally a 4 Monocryl and skin glue for this skin.       The sponge, needle, and instrument counts were correct at the end of the operation.  The patient was awakened from anesthesia, was extubated without incident, and was transported to the Post Anesthesia Care Unit in stable condition.    Estimated Blood Loss:   75cc           Drains: 24 Fr chest tube on - 20 suction                 Specimens:   ID Type Source Tests Collected by Time   A : Right lower lobe wedge for frozen OR 23 call #[id number] Tissue Lung, Right Lower Lobe TISSUE PATHOLOGY EXAM Robinson Epperson MD PhD 1/15/2024 1250   B : Station 7 Tissue Lymph Node TISSUE PATHOLOGY EXAM Robinson Epperson MD PhD 1/15/2024 1258   C : 10 R Tissue Lymph Node TISSUE PATHOLOGY EXAM Robinson Epperson MD PhD 1/15/2024 1355   D : Right lower lobe for permanent Tissue Lung, Right Lower Lobe TISSUE PATHOLOGY EXAM Robinson Epperson MD PhD 1/15/2024 1434   E : 11 R Tissue Lymph Node TISSUE PATHOLOGY EXAM Robinson Epperson MD PhD 1/15/2024 1441              Implants:   Implant Name Type Inv. Item Serial No.  Lot No. LRB No. Used Action   RELOAD STPLR SUREFORM 45 DAVINCI/X/XI 6ROW 4.3 GRN 1P/U - [id number] Implant RELOAD STPLR SUREFORM 45 DAVINCI/X/XI 6ROW 4.3 GRN 1P/U  INTUITIVE SURGICAL [id number] Right 2 Implanted   RELOAD STPLR SUREFORM 45 DAVINCI/X/XI 6ROW 4.3 GRN 1P/U - [id number] Implant RELOAD STPLR SUREFORM 45 DAVINCI/X/XI  6ROW 4.3 GRN 1P/U  INTUITIVE SURGICAL [id number] Right 1 Implanted   RELOAD STPLR SUREFORM 45 DAVINCI/X/XI 6ROW 4.3 GRN 1P/U - [id number] Implant RELOAD STPLR SUREFORM 45 DAVINCI/X/XI 6ROW 4.3 GRN 1P/U  INTUITIVE SURGICAL [id number] Right 1 Implanted   CLIP LIGAT VASC HORIZON TI SM/WD RD 6CT - [id number] Implant CLIP LIGAT VASC HORIZON TI SM/WD RD 6CT  Nano [id number] Right 1 Implanted   RELOAD STPLR SUREFORM 45 DAVINCI/X/XI 6ROW 4.3 GRN 1P/U - [id number] Implant RELOAD STPLR SUREFORM 45 DAVINCI/X/XI 6ROW 4.3 GRN 1P/U  INTUITIVE SURGICAL [id number] Right 1 Implanted   RELOAD STPLR ENDOWRIST 30 DAVINCI/X/XI 6ROW 2.5 WHT 1P/U - [id number] Implant RELOAD STPLR ENDOWRIST 30 DAVINCI/X/XI 6ROW 2.5 WHT 1P/U  INTUITIVE SURGICAL [id number] Right 2 Implanted   RELOAD STPLR SUREFORM 45 DAVINCI/X/XI 6ROW 4.3 GRN 1P/U - [id number] Implant RELOAD STPLR SUREFORM 45 DAVINCI/X/XI 6ROW 4.3 GRN 1P/U  INTUITIVE SURGICAL [id number] Right 1 Implanted   RELOAD STPLR SUREFORM 45 DAVINCI/X/XI 6ROW 4.3 GRN 1P/U - [id number] Implant RELOAD STPLR SUREFORM 45 DAVINCI/X/XI 6ROW 4.3 GRN 1P/U  INTUITIVE SURGICAL [id number] Right 1 Implanted   RELOAD STPLR ENDOWRIST 30 DAVINCI/X/XI 6ROW 2.5 WHT 1P/U - [id number] Implant RELOAD STPLR ENDOWRIST 30 DAVINCI/X/XI 6ROW 2.5 WHT 1P/U  INTUITIVE SURGICAL [id number] Right 2 Implanted              Complications: None           Disposition: PACU - hemodynamically stable.           Condition: Stable     Robinson Epperson MD, PhD

## 2024-01-15 NOTE — PLAN OF CARE
Problem: Adult Inpatient Plan of Care  Goal: Plan of Care Review  Outcome: Ongoing, Progressing  Flowsheets (Taken 1/15/2024 1808)  Progress: no change  Plan of Care Reviewed With: patient   Goal Outcome Evaluation:  Plan of Care Reviewed With: patient        Progress: no change            Pt arrived to the floor from the OR. Pt had a right lower lobectomy. Pt has a right side chest tube -20LWS, air leak present. Pt pain well controlled. Pt has a right radial a-line. Pt is getting IV fluids. Pt is due to void by 2100. VSS. Anticipate discharge in the next day or two.

## 2024-01-15 NOTE — ANESTHESIA PROCEDURE NOTES
Arterial Line    Pre-sedation assessment completed: 1/15/2024 11:05 AM    Patient reassessed immediately prior to procedure    Patient location during procedure: pre-op  Start time: 1/15/2024 11:06 AM  Stop Time:1/15/2024 11:17 AM       Line placed for hemodynamic monitoring.  Performed By   Anesthesiologist: Jaskaran Dupont MD   Preanesthetic Checklist  Completed: patient identified, IV checked, site marked, risks and benefits discussed, surgical consent, monitors and equipment checked, pre-op evaluation and timeout performed  Arterial Line Prep    Sterile Tech: gloves  Prep: ChloraPrep  Arterial Line Procedure   Laterality:right  Location:  radial artery   Guidance: ultrasound guided  Number of attempts: 2  Successful placement: yes   Post Assessment   Dressing Type: occlusive dressing applied.   Complications no   Patient Tolerance: patient tolerated the procedure well with no apparent complications  Additional Notes  Ultrasound Interpretation:  Using ultrasound guidance the needle was placed in close proximity to the nerve and anesthetic was injected in the area of the nerve and spread of the anesthetic was seen on ultrasound in close proximity thereto.  There were no abnormalities seen on ultrasound; a digital image was taken; and the patient tolerated the procedure with no complications.

## 2024-01-15 NOTE — ANESTHESIA PROCEDURE NOTES
Peripheral Block    Pre-sedation assessment completed: 1/15/2024 10:55 AM    Patient reassessed immediately prior to procedure    Patient location during procedure: pre-op  Start time: 1/15/2024 10:56 AM  Stop time: 1/15/2024 11:00 AM  Reason for block: at surgeon's request and post-op pain management  Performed by  Anesthesiologist: Jaskaran Dupont MD  Preanesthetic Checklist  Completed: patient identified, IV checked, site marked, risks and benefits discussed, surgical consent, monitors and equipment checked, pre-op evaluation and timeout performed  Prep:  Sterile barriers:gloves  Prep: ChloraPrep  Patient monitoring: blood pressure monitoring, continuous pulse oximetry and EKG  Procedure    Sedation: yes    Guidance:ultrasound guided    ULTRASOUND INTERPRETATION.  Using ultrasound guidance a 22 G gauge needle was placed in close proximity to the brachial plexus nerve, at which point, under ultrasound guidance anesthetic was injected in the area of the nerve and spread of the anesthesia was seen on ultrasound in close proximity thereto.  There were no abnormalities seen on ultrasound; a digital image was taken; and the patient tolerated the procedure with no complications. Images:still images obtained    Laterality:right  Block Type:erector spinae block  Injection Technique:single-shot  Needle Type:short-bevel  Needle Gauge:22 G      Medications Used: bupivacaine-EPINEPHrine PF (MARCAINE w/EPI) 0.25% -[id number] injection - Injection   20 mL - 1/15/2024 10:56:00 AM  bupivacaine liposome (EXPAREL) 1.3 % injection - Infiltration   10 mL - 1/15/2024 10:56:00 AM      Medications  Comment:Ultrasound Interpretation:  Using ultrasound guidance the needle was placed in close proximity to the nerve and anesthetic was injected in the area of the nerve and spread of the anesthetic was seen on ultrasound in close proximity thereto.  There were no abnormalities seen on ultrasound; a digital image was taken; and the patient  tolerated the procedure with no complications.    .      Exparel 10 ml also added, unable to locate on med list    Post Assessment  Injection Assessment: negative aspiration for heme, no paresthesia on injection and incremental injection  Patient Tolerance:comfortable throughout block  Complications:no

## 2024-01-15 NOTE — ANESTHESIA POSTPROCEDURE EVALUATION
Patient: Bernadette Perez    Procedure Summary       Date: 01/15/24 Room / Location: [facility] OR [address] [place] [address] MAIN OR    Anesthesia Start: 1138 Anesthesia Stop: 1519    Procedure: COMPLETE BRONCHOSCOPY, THORACOSCOPY WITH RIGHT LOWER LOBE WEDGE AND COMPLETION RIGHT LOWER LOBECTOMY WITH DAVINCI ROBOT, INTERCOSTAL NERVE BLOCK CRYO ABLATION, MEDIASTINAL LYMPH NODE DISSECTION (Right: Chest) Diagnosis:       Solitary pulmonary nodule      (Solitary pulmonary nodule [R91.1])    Surgeons: Robinson Epperson MD PhD Provider: Krystin Landry MD    Anesthesia Type: general ASA Status: 2            Anesthesia Type: general    Vitals  Vitals Value Taken Time   /77 01/15/24 1700   Temp 37 °C (98.6 °F) 01/15/24 1515   Pulse 73 01/15/24 1702   Resp 16 01/15/24 1700   SpO2 96 % 01/15/24 1702   Vitals shown include unfiled device data.        Post Anesthesia Care and Evaluation    Level of consciousness: awake  Pain management: satisfactory to patient    Airway patency: patent  Anesthetic complications: No anesthetic complications  PONV Status: controlled  Cardiovascular status: acceptable  Respiratory status: acceptable  Hydration status: acceptable

## 2024-01-15 NOTE — ANESTHESIA PREPROCEDURE EVALUATION
Anesthesia Evaluation     NPO Solid Status: > 8 hours             Airway   Mallampati: II  Neck ROM: full  Comment: Grade 1 view previously  Dental - normal exam     Pulmonary    (-) wheezes  Cardiovascular     ECG reviewed  Rhythm: regular    (+) hypertension, hyperlipidemia  (-) murmur      Neuro/Psych  GI/Hepatic/Renal/Endo    (+) GERD    Musculoskeletal     Abdominal    Substance History      OB/GYN          Other                    Anesthesia Plan    ASA 2     general     (  D/W R&B of GA including but not limited to: heart, lung, liver, kidney, neurologic problems, positioning injuries, dental damage, corneal abrasion and TMJ.  .      A line and KIARA block)  intravenous induction     Anesthetic plan, risks, benefits, and alternatives have been provided, discussed and informed consent has been obtained with: patient.    CODE STATUS:

## 2024-01-16 ENCOUNTER — APPOINTMENT (OUTPATIENT)
Dept: GENERAL RADIOLOGY | Facility: HOSPITAL | Age: 69
End: 2024-01-16
Payer: MEDICARE

## 2024-01-16 LAB
ANION GAP SERPL CALCULATED.3IONS-SCNC: 14.8 MMOL/L (ref 5–15)
BASOPHILS # BLD AUTO: 0.03 10*3/MM3 (ref 0–0.2)
BASOPHILS NFR BLD AUTO: 0.2 % (ref 0–1.5)
BUN SERPL-MCNC: 10 MG/DL (ref 8–23)
BUN/CREAT SERPL: 13.9 (ref 7–25)
CALCIUM SPEC-SCNC: 8.5 MG/DL (ref 8.6–10.5)
CHLORIDE SERPL-SCNC: 100 MMOL/L (ref 98–107)
CO2 SERPL-SCNC: 22.2 MMOL/L (ref 22–29)
CREAT SERPL-MCNC: 0.72 MG/DL (ref 0.57–1)
DEPRECATED RDW RBC AUTO: 40.6 FL (ref 37–54)
EGFRCR SERPLBLD CKD-EPI 2021: 91.8 ML/MIN/1.73
EOSINOPHIL # BLD AUTO: 0 10*3/MM3 (ref 0–0.4)
EOSINOPHIL NFR BLD AUTO: 0 % (ref 0.3–6.2)
ERYTHROCYTE [DISTWIDTH] IN BLOOD BY AUTOMATED COUNT: 13 % (ref 12.3–15.4)
GLUCOSE SERPL-MCNC: 125 MG/DL (ref 65–99)
HCT VFR BLD AUTO: 33.2 % (ref 34–46.6)
HGB BLD-MCNC: 11.3 G/DL (ref 12–15.9)
IMM GRANULOCYTES # BLD AUTO: 0.08 10*3/MM3 (ref 0–0.05)
IMM GRANULOCYTES NFR BLD AUTO: 0.5 % (ref 0–0.5)
LYMPHOCYTES # BLD AUTO: 1 10*3/MM3 (ref 0.7–3.1)
LYMPHOCYTES NFR BLD AUTO: 6.7 % (ref 19.6–45.3)
MCH RBC QN AUTO: 28.8 PG (ref 26.6–33)
MCHC RBC AUTO-ENTMCNC: 34 G/DL (ref 31.5–35.7)
MCV RBC AUTO: 84.5 FL (ref 79–97)
MONOCYTES # BLD AUTO: 0.73 10*3/MM3 (ref 0.1–0.9)
MONOCYTES NFR BLD AUTO: 4.9 % (ref 5–12)
NEUTROPHILS NFR BLD AUTO: 13.17 10*3/MM3 (ref 1.7–7)
NEUTROPHILS NFR BLD AUTO: 87.7 % (ref 42.7–76)
NRBC BLD AUTO-RTO: 0 /100 WBC (ref 0–0.2)
PLATELET # BLD AUTO: 359 10*3/MM3 (ref 140–450)
PMV BLD AUTO: 10.1 FL (ref 6–12)
POTASSIUM SERPL-SCNC: 4 MMOL/L (ref 3.5–5.2)
RBC # BLD AUTO: 3.93 10*6/MM3 (ref 3.77–5.28)
SODIUM SERPL-SCNC: 137 MMOL/L (ref 136–145)
WBC NRBC COR # BLD AUTO: 15.01 10*3/MM3 (ref 3.4–10.8)

## 2024-01-16 PROCEDURE — 25010000002 ENOXAPARIN PER 10 MG: Performed by: STUDENT IN AN ORGANIZED HEALTH CARE EDUCATION/TRAINING PROGRAM

## 2024-01-16 PROCEDURE — 71045 X-RAY EXAM CHEST 1 VIEW: CPT

## 2024-01-16 PROCEDURE — 85025 COMPLETE CBC W/AUTO DIFF WBC: CPT | Performed by: STUDENT IN AN ORGANIZED HEALTH CARE EDUCATION/TRAINING PROGRAM

## 2024-01-16 PROCEDURE — 25010000002 HYDROMORPHONE PER 4 MG: Performed by: STUDENT IN AN ORGANIZED HEALTH CARE EDUCATION/TRAINING PROGRAM

## 2024-01-16 PROCEDURE — 80048 BASIC METABOLIC PNL TOTAL CA: CPT | Performed by: STUDENT IN AN ORGANIZED HEALTH CARE EDUCATION/TRAINING PROGRAM

## 2024-01-16 PROCEDURE — 97166 OT EVAL MOD COMPLEX 45 MIN: CPT

## 2024-01-16 PROCEDURE — 99024 POSTOP FOLLOW-UP VISIT: CPT

## 2024-01-16 RX ORDER — DIPHENHYDRAMINE HYDROCHLORIDE, ZINC ACETATE 2; .1 G/100G; G/100G
1 CREAM TOPICAL 3 TIMES DAILY PRN
Status: DISCONTINUED | OUTPATIENT
Start: 2024-01-16 | End: 2024-01-17

## 2024-01-16 RX ADMIN — DIAZEPAM 2 MG: 2 TABLET ORAL at 15:01

## 2024-01-16 RX ADMIN — ATORVASTATIN CALCIUM 20 MG: 20 TABLET, FILM COATED ORAL at 19:51

## 2024-01-16 RX ADMIN — HYDROXYZINE HYDROCHLORIDE 25 MG: 25 TABLET ORAL at 19:51

## 2024-01-16 RX ADMIN — ACETAMINOPHEN 1000 MG: 500 TABLET ORAL at 16:10

## 2024-01-16 RX ADMIN — ACETAMINOPHEN 1000 MG: 500 TABLET ORAL at 21:17

## 2024-01-16 RX ADMIN — PAROXETINE HYDROCHLORIDE HEMIHYDRATE 20 MG: 20 TABLET, FILM COATED ORAL at 06:05

## 2024-01-16 RX ADMIN — OXYCODONE HYDROCHLORIDE 5 MG: 5 TABLET ORAL at 13:07

## 2024-01-16 RX ADMIN — DOCUSATE SODIUM 100 MG: 100 CAPSULE, LIQUID FILLED ORAL at 08:05

## 2024-01-16 RX ADMIN — LOSARTAN POTASSIUM 50 MG: 50 TABLET, FILM COATED ORAL at 08:05

## 2024-01-16 RX ADMIN — DOCUSATE SODIUM 100 MG: 100 CAPSULE, LIQUID FILLED ORAL at 19:51

## 2024-01-16 RX ADMIN — ACETAMINOPHEN 1000 MG: 500 TABLET ORAL at 08:04

## 2024-01-16 RX ADMIN — OXYCODONE HYDROCHLORIDE 5 MG: 5 TABLET ORAL at 08:05

## 2024-01-16 RX ADMIN — GABAPENTIN 300 MG: 300 CAPSULE ORAL at 13:07

## 2024-01-16 RX ADMIN — GABAPENTIN 300 MG: 300 CAPSULE ORAL at 06:05

## 2024-01-16 RX ADMIN — ENOXAPARIN SODIUM 40 MG: 100 INJECTION SUBCUTANEOUS at 08:05

## 2024-01-16 RX ADMIN — HYDROMORPHONE HYDROCHLORIDE 0.5 MG: 1 INJECTION, SOLUTION INTRAMUSCULAR; INTRAVENOUS; SUBCUTANEOUS at 16:10

## 2024-01-16 RX ADMIN — GABAPENTIN 300 MG: 300 CAPSULE ORAL at 21:17

## 2024-01-16 NOTE — PLAN OF CARE
Goal Outcome Evaluation:  Plan of Care Reviewed With: patient           Outcome Evaluation: Patient is a 68 year old female admitted to [facility] with pulmonary nodule POD1 R lower lobectomy, chest tube x1. Patient was previously independent at home with ADLs, IADLs, and functional mobility at home and community, lives with spouse at baseline. Patient reports significant pain in chest which she describes as burning/tingling, with difficulty getting comfortable. OT notified nursing who assessed patient with plan to give patient IV pain meds, but patient is agreeable to mobilizing prior to pain med administration. Patient performed bed mobility with SBA, able to come to stand with CGA, but O2 sats decreased to 78% on room air with ambulation, RONAK Odell remained with patient and OT throughout session today. Patient assisted to chair and let with RN, thoracic MD/ARNP for further assessment. OT to follow during acute stay, anticipate patient will be able to dc home with HH at acute dc if no further complications.      Anticipated Discharge Disposition (OT): home, home with home health

## 2024-01-16 NOTE — THERAPY EVALUATION
Patient Name: Bernadette Perez  : 1955    MRN: [id number]                              Today's Date: 2024       Admit Date: 1/15/2024    Visit Dx:     ICD-10-CM ICD-9-CM   1. Solitary pulmonary nodule  R91.1 793.11     Patient Active Problem List   Diagnosis    Arthritis    Allergic rhinitis    Hx of colonic polyps    Family history of diabetes mellitus type II    Family history of heart disease    Hyperlipidemia    Elevated liver enzymes    Gastroesophageal reflux disease    Depression with anxiety    Primary hypertension    Melanoma    Solitary pulmonary nodule    Non-small cell lung cancer     Past Medical History:   Diagnosis Date    Allergic     ALLERGY INJECTIONS - [facility] EVERY 2 WEEKS    Anxiety     Arthritis     Colon polyp     GERD (gastroesophageal reflux disease)     H/O bone density study     dr torres - [facility]    H/O complete eye exam 2016    History of lung biopsy     normal    Hyperlipidemia     Hypertension     Melanoma 06/15/2016    Right mid back    Pulmonary nodule     RIGHT     Past Surgical History:   Procedure Laterality Date    BRONCHOSCOPY WITH ION ROBOTIC ASSIST  2023    Procedure: BRONCHOSCOPY WITH ION ROBOT WITH BAL, BX AND FNA;  Surgeon: Robinson Epperson MD PhD;  Location: [facility] ENDOSCOPY;  Service: Robotics - Pulmonary;;  PRE/POST - lung nodule    CARPAL TUNNEL RELEASE Bilateral     COLONOSCOPY  due    polyps     ENDOSCOPY      EYE SURGERY Right     CORNEA TRANSPLANT    HEMORRHOIDECTOMY      HYSTERECTOMY      LOBECTOMY Right 1/15/2024    Procedure: COMPLETE BRONCHOSCOPY, THORACOSCOPY WITH RIGHT LOWER LOBE WEDGE AND COMPLETION RIGHT LOWER LOBECTOMY WITH DAVINCI ROBOT, INTERCOSTAL NERVE BLOCK CRYO ABLATION, MEDIASTINAL LYMPH NODE DISSECTION;  Surgeon: Robinson Epperson MD PhD;  Location: [facility] OR;  Service: Robotics - DaVinci;  Laterality: Right;    SKIN CANCER EXCISION      BACK      General Information       Row Name 24 1526           OT Time and Intention    Document Type evaluation  -     Mode of Treatment individual therapy;occupational therapy  -       Row Name 01/16/24 1526          General Information    Patient Profile Reviewed yes  -     Prior Level of Function independent:  -     Existing Precautions/Restrictions fall  Chest tube x1  -     Barriers to Rehab medically complex  -       Row Name 01/16/24 1526          Living Environment    People in Home spouse  -[facility] Name 01/16/24 1526          Cognition    Orientation Status (Cognition) oriented x 4  -       Row Name 01/16/24 1526          Safety Issues, Functional Mobility    Impairments Affecting Function (Mobility) pain;shortness of breath  -               User Key  (r) = Recorded By, (t) = Taken By, (c) = Cosigned By      Initials Name Provider Type     Krystin Rogers OT Occupational Therapist                     Mobility/ADL's       Row Name 01/16/24 1526          Bed Mobility    Bed Mobility bed mobility (all) activities  -     All Activities, [place] (Bed Mobility) standby assist  -     Assistive Device (Bed Mobility) bed rails;head of bed elevated  -[facility] Name 01/16/24 1526          Transfers    Transfers sit-stand transfer;stand-sit transfer  -[facility] Name 01/16/24 1526          Sit-Stand Transfer    Sit-Stand [place] (Transfers) contact guard  -     Comment, (Sit-Stand Transfer) HHA  -       Row Name 01/16/24 1526          Stand-Sit Transfer    Stand-Sit [place] (Transfers) contact guard  -[facility] Name 01/16/24 1526          Functional Mobility    Functional Mobility- Ind. Level contact guard assist  -     Patient was able to Ambulate yes  -               User Key  (r) = Recorded By, (t) = Taken By, (c) = Cosigned By      Initials Name Provider Type     Krystin Rogers OT Occupational Therapist                   Obj/Interventions       Row Name 01/16/24 1527          Sensory Assessment (Somatosensory)     Sensory Assessment (Somatosensory) sensation intact  -     Sensory Assessment Reports feeling of burning in chest  -[facility] Name 01/16/24 1527          Range of Motion Comprehensive    General Range of Motion bilateral upper extremity ROM WFL  -[facility] Name 01/16/24 1527          Strength Comprehensive (MMT)    Comment, General Manual Muscle Testing (MMT) Assessment Generalized weakness  -[facility] Name 01/16/24 1527          Motor Skills    Motor Skills functional endurance  -     Functional Endurance Fair  -[facility] Name 01/16/24 1527          Balance    Balance Assessment sitting static balance;sitting dynamic balance;sit to stand dynamic balance;standing static balance;standing dynamic balance  -     Static Sitting Balance standby assist  -     Dynamic Sitting Balance standby assist  -     Position, Sitting Balance unsupported  -     Sit to Stand Dynamic Balance contact guard  -     Static Standing Balance contact guard  -     Dynamic Standing Balance contact guard  -     Position/Device Used, Standing Balance unsupported  -     Balance Interventions sitting;standing;sit to stand;occupation based/functional task  -               User Key  (r) = Recorded By, (t) = Taken By, (c) = Cosigned By      Initials Name Provider Type     Krystin Rogers OT Occupational Therapist                   Goals/Plan       Row Name 01/16/24 1533          Bed Mobility Goal 1 (OT)    Activity/Assistive Device (Bed Mobility Goal 1, OT) bed mobility activities, all  -     [place] Level/Cues Needed (Bed Mobility Goal 1, OT) modified independence  -     Time Frame (Bed Mobility Goal 1, OT) short term goal (STG);2 weeks  -[facility] Name 01/16/24 1533          Transfer Goal 1 (OT)    Activity/Assistive Device (Transfer Goal 1, OT) transfers, all  -     [place] Level/Cues Needed (Transfer Goal 1, OT) modified independence  -     Time Frame (Transfer Goal 1, OT) short term goal  (STG);2 weeks  -     Progress/Outcome (Transfer Goal 1, OT) new goal  -       Row Name 01/16/24 1533          Dressing Goal 1 (OT)    Activity/Device (Dressing Goal 1, OT) dressing skills, all;upper body dressing;lower body dressing  -     [place]/Cues Needed (Dressing Goal 1, OT) modified independence  -     Time Frame (Dressing Goal 1, OT) short term goal (STG);2 weeks  -     Progress/Outcome (Dressing Goal 1, OT) new goal  -       Row Name 01/16/24 1533          Toileting Goal 1 (OT)    Activity/Device (Toileting Goal 1, OT) toileting skills, all  -     [place] Level/Cues Needed (Toileting Goal 1, OT) modified independence  -     Time Frame (Toileting Goal 1, OT) short term goal (STG);2 weeks  -     Progress/Outcome (Toileting Goal 1, OT) new goal  -[facility] Name 01/16/24 1533          Grooming Goal 1 (OT)    Activity/Device (Grooming Goal 1, OT) grooming skills, all  -     [place] (Grooming Goal 1, OT) modified independence  -     Time Frame (Grooming Goal 1, OT) short term goal (STG);2 weeks  -     Progress/Outcome (Grooming Goal 1, OT) new goal  -       Row Name 01/16/24 1533          Problem Specific Goal 1 (OT)    Problem Specific Goal 1 (OT) Patient will implement energy conservation and work simplification strategies in order to complete ADL routine independently.  -     Time Frame (Problem Specific Goal 1, OT) short term goal (STG);2 weeks  -     Progress/Outcome (Problem Specific Goal 1, OT) new goal  -       Row Name 01/16/24 1533          Therapy Assessment/Plan (OT)    Planned Therapy Interventions (OT) activity tolerance training;BADL retraining;functional balance retraining;occupation/activity based interventions;patient/caregiver education/training;strengthening exercise;transfer/mobility retraining  -               User Key  (r) = Recorded By, (t) = Taken By, (c) = Cosigned By      Initials Name Provider Type     Krystin Rogers, OT Occupational  Therapist                   Clinical Impression       Row Name 01/16/24 1527          Pain Assessment    Pretreatment Pain Rating 8/10  -     Posttreatment Pain Rating 8/10  -     Pre/Posttreatment Pain Comment Chest pain/burning sensation, notified nursing  -     Pain Intervention(s) Medication (See MAR)  -       Row Name 01/16/24 1527          Plan of Care Review    Plan of Care Reviewed With patient  -     Outcome Evaluation Patient is a 68 year old female admitted to [facility] with pulmonary nodule POD1 R lower lobectomy, chest tube x1. Patient was previously independent at home with ADLs, IADLs, and functional mobility at home and community, lives with spouse at baseline. Patient reports significant pain in chest which she describes as burning/tingling, with difficulty getting comfortable. OT notified nursing who assessed patient with plan to give patient IV pain meds, but patient is agreeable to mobilizing prior to pain med administration. Patient performed bed mobility with SBA, able to come to stand with CGA, but O2 sats decreased to 78% on room air with ambulation, RONAK Odell remained with patient and OT throughout session today. Patient assisted to chair and let with RN, thoracic MD/ARNP for further assessment. OT to follow during acute stay, anticipate patient will be able to dc home with HH at acute dc if no further complications.  -       Row Name 01/16/24 1527          Therapy Assessment/Plan (OT)    Rehab Potential (OT) good, to achieve stated therapy goals  -     Criteria for Skilled Therapeutic Interventions Met (OT) yes;meets criteria;skilled treatment is necessary  -     Therapy Frequency (OT) 5 times/wk  -       Row Name 01/16/24 1527          Therapy Plan Review/Discharge Plan (OT)    Anticipated Discharge Disposition (OT) home;home with home health  -       Row Name 01/16/24 1527          Positioning and Restraints    Pre-Treatment Position in bed  -     Post Treatment Position  chair  -     In Chair sitting;notified nsg;with nsg;with other staff  -               User Key  (r) = Recorded By, (t) = Taken By, (c) = Cosigned By      Initials Name Provider Type     Krystin Rogers OT Occupational Therapist                   Outcome Measures       Row Name 01/16/24 1533          How much help from another is currently needed...    Putting on and taking off regular lower body clothing? 3  -LH     Bathing (including washing, rinsing, and drying) 3  -LH     Toileting (which includes using toilet bed pan or urinal) 3  -LH     Putting on and taking off regular upper body clothing 3  -LH     Taking care of personal grooming (such as brushing teeth) 3  -LH     Eating meals 4  -     AM-PAC 6 Clicks Score (OT) 19  -       Row Name 01/16/24 0800          How much help from another person do you currently need...    Turning from your back to your side while in flat bed without using bedrails? 3  -DC     Moving from lying on back to sitting on the side of a flat bed without bedrails? 3  -DC     Moving to and from a bed to a chair (including a wheelchair)? 3  -DC     Standing up from a chair using your arms (e.g., wheelchair, bedside chair)? 3  -DC     Climbing 3-5 steps with a railing? 3  -DC     To walk in hospital room? 3  -DC     AM-PAC 6 Clicks Score (PT) 18  -DC     Highest Level of Mobility Goal 6 --> Walk 10 steps or more  -MD       Row Name 01/16/24 1533          Functional Assessment    Outcome Measure Options AM-PAC 6 Clicks Daily Activity (OT)  -               User Key  (r) = Recorded By, (t) = Taken By, (c) = Cosigned By      Initials Name Provider Type    Cass Martínez, RN Registered Nurse     Krystin Rogers OT Occupational Therapist                    Occupational Therapy Education       Title: PT OT SLP Therapies (Done)       Topic: Occupational Therapy (Done)       Point: ADL training (Done)       Description:   Instruct learner(s) on proper safety adaptation and remediation  techniques during self care or transfers.   Instruct in proper use of assistive devices.                  Learning Progress Summary             Patient Acceptance, E,TB, VU by  at 1/16/2024 1533    Comment: Instructed in role of OT, discharge plan, paced breathing                         Point: Home exercise program (Done)       Description:   Instruct learner(s) on appropriate technique for monitoring, assisting and/or progressing therapeutic exercises/activities.                  Learning Progress Summary             Patient Acceptance, E,TB, VU by  at 1/16/2024 1533    Comment: Instructed in role of OT, discharge plan, paced breathing                         Point: Precautions (Done)       Description:   Instruct learner(s) on prescribed precautions during self-care and functional transfers.                  Learning Progress Summary             Patient Acceptance, E,TB, VU by  at 1/16/2024 1533    Comment: Instructed in role of OT, discharge plan, paced breathing                         Point: Body mechanics (Done)       Description:   Instruct learner(s) on proper positioning and spine alignment during self-care, functional mobility activities and/or exercises.                  Learning Progress Summary             Patient Acceptance, E,TB, VU by  at 1/16/2024 1533    Comment: Instructed in role of OT, discharge plan, paced breathing                                         User Key       Initials Effective Dates Name Provider Type Discipline     08/31/23 -  Krystin Rogers OT Occupational Therapist OT                  OT Recommendation and Plan  Planned Therapy Interventions (OT): activity tolerance training, BADL retraining, functional balance retraining, occupation/activity based interventions, patient/caregiver education/training, strengthening exercise, transfer/mobility retraining  Therapy Frequency (OT): 5 times/wk  Plan of Care Review  Plan of Care Reviewed With: patient  Outcome Evaluation:  Patient is a 68 year old female admitted to [facility] with pulmonary nodule POD1 R lower lobectomy, chest tube x1. Patient was previously independent at home with ADLs, IADLs, and functional mobility at home and community, lives with spouse at baseline. Patient reports significant pain in chest which she describes as burning/tingling, with difficulty getting comfortable. OT notified nursing who assessed patient with plan to give patient IV pain meds, but patient is agreeable to mobilizing prior to pain med administration. Patient performed bed mobility with SBA, able to come to stand with CGA, but O2 sats decreased to 78% on room air with ambulation, RN Cass remained with patient and OT throughout session today. Patient assisted to chair and let with RN, thoracic MD/ARNP for further assessment. OT to follow during acute stay, anticipate patient will be able to dc home with HH at acute dc if no further complications.     Time Calculation:   Evaluation Complexity (OT)  Review Occupational Profile/Medical/Therapy History Complexity: expanded/moderate complexity  Assessment, Occupational Performance/Identification of Deficit Complexity: 3-5 performance deficits  Clinical Decision Making Complexity (OT): detailed assessment/moderate complexity  Overall Complexity of Evaluation (OT): moderate complexity     Time Calculation- OT       Row Name 01/16/24 1534             Time Calculation- OT    OT Start Time 1451  -      OT Stop Time 1500  -      OT Time Calculation (min) 9 min  -      OT Non-Billable Time (min) 9 min  -      OT Received On 01/16/24  -      OT - Next Appointment 01/17/24  -      OT Goal Re-Cert Due Date 01/30/24  -         Untimed Charges    OT Eval/Re-eval Minutes 9  -         Total Minutes    Untimed Charges Total Minutes 9  -       Total Minutes 9  -                User Key  (r) = Recorded By, (t) = Taken By, (c) = Cosigned By      Initials Name Provider Type     Krystin Rogers, OT  Occupational Therapist                  Therapy Charges for Today       Code Description Service Date Service Provider Modifiers Qty    [id number] HC OT EVAL MOD COMPLEXITY 3 1/16/2024 Krystin Rogers, MELANIA GO 1                 Krystin Rogers OT  1/16/2024

## 2024-01-16 NOTE — PLAN OF CARE
Problem: Adult Inpatient Plan of Care  Goal: Plan of Care Review  Outcome: Ongoing, Progressing  Flowsheets (Taken 1/16/2024 1523)  Progress: no change  Plan of Care Reviewed With: patient   Goal Outcome Evaluation:  Plan of Care Reviewed With: patient        Progress: no change       Pt right side chest tube is still to water-seal, fluctuation present. Dressing has been changed 4 times due to leaking at the site. Pt remains on room air. Pt went for afternoon walk and became SOA with exertion. STAT chest x-ray ordered, waiting on results, MD aware. Pain controlled with PRN pain medications. VSS. Anticipate discharge home in the next day or two.

## 2024-01-16 NOTE — PLAN OF CARE
Goal Outcome Evaluation:           Progress: no change  Outcome Evaluation: VSS. RA. A/Ox4. No c/o pain. Frequent dry cough. Chest tube to water seal and has air leak with fluctuation. No subqutaneous air around tube. purewick in use.

## 2024-01-16 NOTE — PROGRESS NOTES
"    Chief Complaint: Enlarging right pulmonary nodule, right lower lobe  S/P: Complete bronchoscopy, thoracoscopy with right lower lobe wedge resection with completion right lower lobectomy with da Beata robot, intercostal nerve block cryoablation, mediastinal lymph node dissection  POD # 1    Subjective:  Symptoms:  Stable.  No shortness of breath or cough.    Diet:  Adequate intake.  No nausea or vomiting.    Activity level: Returning to normal.    Pain:  She complains of pain that is moderate.  Pain is well controlled.        Vital Signs:  Temp:  [97.5 °F (36.4 °C)-98.7 °F (37.1 °C)] 98.7 °F (37.1 °C)  Heart Rate:  [67-84] 75  Resp:  [16] 16  BP: (106-139)/(57-93) 106/57    Intake & Output (last day)         01/15 0701  01/16 0700 01/16 0701  01/17 0700    P.O. 4000     I.V. (mL/kg) 1250 (18.4)     IV Piggyback 150     Total Intake(mL/kg) 5400 (79.4)     Urine (mL/kg/hr) 1750 1400 (2.3)    Blood 100     Chest Tube 384     Total Output 2234 1400    Net +3166 -1400          Urine Unmeasured Occurrence  2 x            Objective:  General Appearance:  Comfortable, well-appearing, in no acute distress and in pain.    Vital signs: (most recent): Blood pressure 106/57, pulse 75, temperature 98.7 °F (37.1 °C), temperature source Oral, resp. rate 16, height 158.8 cm (62.52\"), weight 68 kg (149 lb 14.6 oz), SpO2 97%.    Lungs:  Normal effort and normal respiratory rate.  She is not in respiratory distress.    Heart: Normal rate.  Regular rhythm.    Chest: Symmetric chest wall expansion.   Abdomen: Abdomen is soft and non-distended.    Neurological: Patient is alert and oriented to person, place and time.    Skin:  Warm and dry.                Chest tube:   Site: Right, Clean, Dry, and Intact  Suction: waterseal  Air Leak: negative, wide fluctuation  24 Hour Total: 384ml    Results Review:       I reviewed the patient's new clinical results.  I reviewed the patient's new imaging results and agree with the " interpretation.  Discussed with patient, nurse, Dr. Epperson  Imaging Results (Last 24 Hours)       Procedure Component Value Units Date/Time    XR Chest 1 View [[id number]] Collected: 01/16/24 1516     Updated: 01/16/24 1520    Narrative:      XR CHEST 1 VW-1/16/2024 at 030 8:00 p.m.     HISTORY: Chest tube management.     Again seen is volume loss of the left mid thorax. There is mild lucency  along the medial margin of the right lung as well as possible pleural  line projecting over the heart which is shifted to the right. This is  suggestive of a small but somewhat atypical appearing right pneumothorax  similar to the earlier study today.     Left lung appears clear. Heart size is within normal limits. Soft tissue  air is seen on the right with a right chest tube curled in the right  hemithorax.       Impression:      1. There still is suggestion of a right pneumothorax similar to the  earlier study today. This could be further evaluated with a CT of the  chest if clinically indicated.     This report was finalized on 1/16/2024 3:17 PM by Dr. Alek Shi M.D on Workstation: [id number]       XR Chest 1 View [[id number]] Collected: 01/16/24 1018     Updated: 01/16/24 1023    Narrative:      XR CHEST 1 VW-1/16/2024     HISTORY: Postop. Chest tube management.     There is volume loss of the right hemithorax. There is some relative  lucency along the medial aspect of the right hemithorax adjacent to the  mediastinum which may represent small medial pneumothorax. Additional  lucency and possible pleural line is seen to the right of the mid  thoracic spine. Right chest tube terminates in the right base. Soft  tissue air is seen on the right. Left lung appears clear.     Heart size is mildly enlarged.       Impression:      1. Volume loss of the right hemithorax as described. There may be a  small medial pneumothorax.        This report was finalized on 1/16/2024 10:20 AM by Dr. Alek Shi M.D on Workstation:  [id number]       XR Chest 1 View [[id number]] Collected: 01/15/24 1601     Updated: 01/15/24 1610    Narrative:      XR CHEST 1 VW-     HISTORY: Female who is 68 years-old, chest tube placement     TECHNIQUE: Frontal view of the chest     COMPARISON: 12/18/2023     FINDINGS: Right chest tube extends to the medial right base. The patient  is rotated towards the right. The heart appears mildly enlarged with  mild vascular congestion. Likely atelectasis in the left midlung.  Lucency apparent medially at the right base suggests basilar  pneumothorax. No pleural effusion or left pneumothorax. No acute osseous  process.       Impression:      Right chest tube placement with suspected medial right  basilar pneumothorax. Cardiomegaly and pulmonary vascular congestion.     This report was finalized on 1/15/2024 4:07 PM by Dr. Zack Li M.D on Workstation: [id number]               Lab Results:     Lab Results (last 24 hours)       Procedure Component Value Units Date/Time    Basic Metabolic Panel [[id number]]  (Abnormal) Collected: 01/16/24 0624    Specimen: Blood Updated: 01/16/24 0754     Glucose 125 mg/dL      BUN 10 mg/dL      Creatinine 0.72 mg/dL      Sodium 137 mmol/L      Potassium 4.0 mmol/L      Chloride 100 mmol/L      CO2 22.2 mmol/L      Calcium 8.5 mg/dL      BUN/Creatinine Ratio 13.9     Anion Gap 14.8 mmol/L      eGFR 91.8 mL/min/1.73     Narrative:      GFR Normal >60  Chronic Kidney Disease <60  Kidney Failure <15      CBC & Differential [[id number]]  (Abnormal) Collected: 01/16/24 0624    Specimen: Blood Updated: 01/16/24 0735    Narrative:      The following orders were created for panel order CBC & Differential.  Procedure                               Abnormality         Status                     ---------                               -----------         ------                     CBC Auto Differential[[id number]]        Abnormal            Final result                 Please view results for these  tests on the individual orders.    CBC Auto Differential [[id number]]  (Abnormal) Collected: 01/16/24 0624    Specimen: Blood Updated: 01/16/24 0735     WBC 15.01 10*3/mm3      RBC 3.93 10*6/mm3      Hemoglobin 11.3 g/dL      Hematocrit 33.2 %      MCV 84.5 fL      MCH 28.8 pg      MCHC 34.0 g/dL      RDW 13.0 %      RDW-SD 40.6 fl      MPV 10.1 fL      Platelets 359 10*3/mm3      Neutrophil % 87.7 %      Lymphocyte % 6.7 %      Monocyte % 4.9 %      Eosinophil % 0.0 %      Basophil % 0.2 %      Immature Grans % 0.5 %      Neutrophils, Absolute 13.17 10*3/mm3      Lymphocytes, Absolute 1.00 10*3/mm3      Monocytes, Absolute 0.73 10*3/mm3      Eosinophils, Absolute 0.00 10*3/mm3      Basophils, Absolute 0.03 10*3/mm3      Immature Grans, Absolute 0.08 10*3/mm3      nRBC 0.0 /100 WBC     Tissue Pathology Exam [[id number]] Collected: 01/15/24 1250    Specimen: Tissue from Lung, Right Lower Lobe; Tissue from Lymph Node; Tissue from Lymph Node; Tissue from Lung, Right Lower Lobe; Tissue from Lymph Node Updated: 01/15/24 2217    Basic Metabolic Panel [[id number]]  (Abnormal) Collected: 01/15/24 1549    Specimen: Blood Updated: 01/15/24 1634     Glucose 144 mg/dL      BUN 9 mg/dL      Creatinine 0.71 mg/dL      Sodium 140 mmol/L      Potassium 4.1 mmol/L      Chloride 104 mmol/L      CO2 23.4 mmol/L      Calcium 8.6 mg/dL      BUN/Creatinine Ratio 12.7     Anion Gap 12.6 mmol/L      eGFR 93.3 mL/min/1.73     Narrative:      GFR Normal >60  Chronic Kidney Disease <60  Kidney Failure <15      CBC (No Diff) [[id number]]  (Abnormal) Collected: 01/15/24 1549    Specimen: Blood Updated: 01/15/24 1615     WBC 11.94 10*3/mm3      RBC 4.05 10*6/mm3      Hemoglobin 11.6 g/dL      Hematocrit 34.9 %      MCV 86.2 fL      MCH 28.6 pg      MCHC 33.2 g/dL      RDW 13.5 %      RDW-SD 43.0 fl      MPV 9.7 fL      Platelets 344 10*3/mm3              Assessment & Plan       Solitary pulmonary nodule    Non-small cell lung cancer     Assessment &  Plan  Patient continues to do well postoperatively. She denies any shortness of breath although has some dyspnea on exertion when with OT evaluation.  She is on room air although may benefit from walking oximetry prior to discharge to assess for O2 need with exertion.  She is endorsing a mild to moderate amount of postoperative pain today, continue analgesic medications.  A.m. chest x-ray reviewed which demonstrates some right pleural separation, not uncommon following right lobectomy.  She has a rash localized to her back.  Patient reports she is hypersensitive to tape that they have been using.  Will switch dressings to paper tape, she reports she is not allergic to this.  As needed Benadryl cream for her rash.  Encourage good pulmonary hygiene.  Increase activity including walks around the nurses station.    TRISH Clarke  [facility]  01/16/24  16:03 EST

## 2024-01-17 ENCOUNTER — APPOINTMENT (OUTPATIENT)
Dept: GENERAL RADIOLOGY | Facility: HOSPITAL | Age: 69
End: 2024-01-17
Payer: MEDICARE

## 2024-01-17 PROCEDURE — 97162 PT EVAL MOD COMPLEX 30 MIN: CPT

## 2024-01-17 PROCEDURE — 25010000002 FUROSEMIDE PER 20 MG: Performed by: NURSE PRACTITIONER

## 2024-01-17 PROCEDURE — 94799 UNLISTED PULMONARY SVC/PX: CPT

## 2024-01-17 PROCEDURE — 25010000002 ENOXAPARIN PER 10 MG: Performed by: STUDENT IN AN ORGANIZED HEALTH CARE EDUCATION/TRAINING PROGRAM

## 2024-01-17 PROCEDURE — 71045 X-RAY EXAM CHEST 1 VIEW: CPT

## 2024-01-17 PROCEDURE — 97530 THERAPEUTIC ACTIVITIES: CPT

## 2024-01-17 PROCEDURE — 94762 N-INVAS EAR/PLS OXIMTRY CONT: CPT

## 2024-01-17 PROCEDURE — 94760 N-INVAS EAR/PLS OXIMETRY 1: CPT

## 2024-01-17 PROCEDURE — 99024 POSTOP FOLLOW-UP VISIT: CPT

## 2024-01-17 PROCEDURE — 97535 SELF CARE MNGMENT TRAINING: CPT

## 2024-01-17 RX ORDER — GABAPENTIN 300 MG/1
300 CAPSULE ORAL EVERY 8 HOURS SCHEDULED
Qty: 90 CAPSULE | Refills: 0 | Status: SHIPPED | OUTPATIENT
Start: 2024-01-17 | End: 2024-02-16

## 2024-01-17 RX ORDER — ACETAMINOPHEN 500 MG
1000 TABLET ORAL 3 TIMES DAILY
Qty: 80 TABLET | Refills: 0 | Status: SHIPPED | OUTPATIENT
Start: 2024-01-17 | End: 2024-01-31

## 2024-01-17 RX ORDER — OXYCODONE HYDROCHLORIDE 5 MG/1
5 TABLET ORAL EVERY 4 HOURS PRN
Qty: 18 TABLET | Refills: 0 | Status: SHIPPED | OUTPATIENT
Start: 2024-01-17 | End: 2024-01-20

## 2024-01-17 RX ORDER — HYDROXYZINE HYDROCHLORIDE 25 MG/1
25 TABLET, FILM COATED ORAL 3 TIMES DAILY PRN
Status: DISCONTINUED | OUTPATIENT
Start: 2024-01-17 | End: 2024-01-18 | Stop reason: HOSPADM

## 2024-01-17 RX ORDER — TRIAMCINOLONE ACETONIDE 1 MG/G
CREAM TOPICAL EVERY 12 HOURS SCHEDULED
Status: DISCONTINUED | OUTPATIENT
Start: 2024-01-17 | End: 2024-01-18 | Stop reason: HOSPADM

## 2024-01-17 RX ORDER — FUROSEMIDE 10 MG/ML
20 INJECTION INTRAMUSCULAR; INTRAVENOUS ONCE
Status: COMPLETED | OUTPATIENT
Start: 2024-01-17 | End: 2024-01-17

## 2024-01-17 RX ORDER — DIPHENHYDRAMINE HYDROCHLORIDE, ZINC ACETATE 2; .1 G/100G; G/100G
1 CREAM TOPICAL 3 TIMES DAILY PRN
Qty: 28 G | Refills: 0 | Status: SHIPPED | OUTPATIENT
Start: 2024-01-17 | End: 2024-02-16

## 2024-01-17 RX ADMIN — ACETAMINOPHEN 1000 MG: 500 TABLET ORAL at 16:28

## 2024-01-17 RX ADMIN — GABAPENTIN 300 MG: 300 CAPSULE ORAL at 20:54

## 2024-01-17 RX ADMIN — OXYCODONE HYDROCHLORIDE 5 MG: 5 TABLET ORAL at 14:14

## 2024-01-17 RX ADMIN — HYDROXYZINE HYDROCHLORIDE 25 MG: 25 TABLET ORAL at 21:14

## 2024-01-17 RX ADMIN — ATORVASTATIN CALCIUM 20 MG: 20 TABLET, FILM COATED ORAL at 20:54

## 2024-01-17 RX ADMIN — OXYCODONE HYDROCHLORIDE 5 MG: 5 TABLET ORAL at 00:51

## 2024-01-17 RX ADMIN — OXYCODONE HYDROCHLORIDE 5 MG: 5 TABLET ORAL at 06:43

## 2024-01-17 RX ADMIN — HYDROXYZINE HYDROCHLORIDE 25 MG: 25 TABLET ORAL at 06:43

## 2024-01-17 RX ADMIN — PAROXETINE HYDROCHLORIDE HEMIHYDRATE 20 MG: 20 TABLET, FILM COATED ORAL at 06:43

## 2024-01-17 RX ADMIN — GABAPENTIN 300 MG: 300 CAPSULE ORAL at 14:14

## 2024-01-17 RX ADMIN — DOCUSATE SODIUM 100 MG: 100 CAPSULE, LIQUID FILLED ORAL at 08:48

## 2024-01-17 RX ADMIN — HYDROXYZINE HYDROCHLORIDE 25 MG: 25 TABLET ORAL at 14:14

## 2024-01-17 RX ADMIN — DOCUSATE SODIUM 100 MG: 100 CAPSULE, LIQUID FILLED ORAL at 20:54

## 2024-01-17 RX ADMIN — FUROSEMIDE 20 MG: 10 INJECTION, SOLUTION INTRAMUSCULAR; INTRAVENOUS at 10:32

## 2024-01-17 RX ADMIN — OXYCODONE HYDROCHLORIDE 5 MG: 5 TABLET ORAL at 18:28

## 2024-01-17 RX ADMIN — ACETAMINOPHEN 1000 MG: 500 TABLET ORAL at 20:54

## 2024-01-17 RX ADMIN — ENOXAPARIN SODIUM 40 MG: 100 INJECTION SUBCUTANEOUS at 08:49

## 2024-01-17 RX ADMIN — TRIAMCINOLONE ACETONIDE: 1 CREAM TOPICAL at 18:28

## 2024-01-17 RX ADMIN — GABAPENTIN 300 MG: 300 CAPSULE ORAL at 06:43

## 2024-01-17 RX ADMIN — ACETAMINOPHEN 1000 MG: 500 TABLET ORAL at 08:48

## 2024-01-17 NOTE — THERAPY EVALUATION
Patient Name: Bernadette Perez  : 1955    MRN: [id number]                              Today's Date: 2024       Admit Date: 1/15/2024    Visit Dx:     ICD-10-CM ICD-9-CM   1. Non-small cell cancer of right lung  C34.91 162.9   2. Solitary pulmonary nodule  R91.1 793.11     Patient Active Problem List   Diagnosis    Arthritis    Allergic rhinitis    Hx of colonic polyps    Family history of diabetes mellitus type II    Family history of heart disease    Hyperlipidemia    Elevated liver enzymes    Gastroesophageal reflux disease    Depression with anxiety    Primary hypertension    Melanoma    Solitary pulmonary nodule    Non-small cell lung cancer     Past Medical History:   Diagnosis Date    Allergic     ALLERGY INJECTIONS - [facility] EVERY 2 WEEKS    Anxiety     Arthritis     Colon polyp     GERD (gastroesophageal reflux disease)     H/O bone density study     dr torres - [facility]    H/O complete eye exam 2016    History of lung biopsy     normal    Hyperlipidemia     Hypertension     Melanoma 06/15/2016    Right mid back    Pulmonary nodule     RIGHT     Past Surgical History:   Procedure Laterality Date    BRONCHOSCOPY WITH ION ROBOTIC ASSIST  2023    Procedure: BRONCHOSCOPY WITH ION ROBOT WITH BAL, BX AND FNA;  Surgeon: Robinson Epperson MD PhD;  Location: [facility] ENDOSCOPY;  Service: Robotics - Pulmonary;;  PRE/POST - lung nodule    CARPAL TUNNEL RELEASE Bilateral     COLONOSCOPY  due    polyps     ENDOSCOPY      EYE SURGERY Right     CORNEA TRANSPLANT    HEMORRHOIDECTOMY      HYSTERECTOMY      LOBECTOMY Right 1/15/2024    Procedure: COMPLETE BRONCHOSCOPY, THORACOSCOPY WITH RIGHT LOWER LOBE WEDGE AND COMPLETION RIGHT LOWER LOBECTOMY WITH DAVINCI ROBOT, INTERCOSTAL NERVE BLOCK CRYO ABLATION, MEDIASTINAL LYMPH NODE DISSECTION;  Surgeon: Robinson Epperson MD PhD;  Location: [facility] MAIN OR;  Service: Robotics - DaVinci;  Laterality: Right;    SKIN CANCER EXCISION      BACK       General Information       Row Name 01/17/24 0930          Physical Therapy Time and Intention    Document Type evaluation  -EM     Mode of Treatment individual therapy;physical therapy  -EM       Row Name 01/17/24 0930          General Information    Patient Profile Reviewed yes  -EM     Prior Level of Function independent:  -EM     Existing Precautions/Restrictions fall  -EM       Row Name 01/17/24 0930          Living Environment    People in Home spouse  -EM       Row Name 01/17/24 0930          Home Main Entrance    Number of Stairs, Main Entrance two  -EM       Row Name 01/17/24 0930          Stairs Within Home, Primary    Number of Stairs, Within Home, Primary none  -EM       Row Name 01/17/24 0930          Cognition    Orientation Status (Cognition) oriented x 4  -EM               User Key  (r) = Recorded By, (t) = Taken By, (c) = Cosigned By      Initials Name Provider Type    EM Sneha Corbett PT Physical Therapist                   Mobility       Row Name 01/17/24 0930          Bed Mobility    Comment, (Bed Mobility) not tested, up in chair  -EM       Row Name 01/17/24 0930          Sit-Stand Transfer    Sit-Stand [place] (Transfers) supervision  -EM       Row Name 01/17/24 0930          Gait/Stairs (Locomotion)    [place] Level (Gait) standby assist  -EM     Distance in Feet (Gait) 350  -EM     Comment, (Gait/Stairs) no LOB, no SOA noted  -EM               User Key  (r) = Recorded By, (t) = Taken By, (c) = Cosigned By      Initials Name Provider Type    EM Sneha Corbett PT Physical Therapist                   Obj/Interventions       Row Name 01/17/24 0931          Range of Motion Comprehensive    General Range of Motion no range of motion deficits identified  -EM       Row Name 01/17/24 0931          Strength Comprehensive (MMT)    General Manual Muscle Testing (MMT) Assessment other (see comments)  -EM     Comment, General Manual Muscle Testing (MMT) Assessment no focal deficits  "identified  -EM       Row Name 01/17/24 0931          Balance    Static Sitting Balance independent  -EM     Dynamic Sitting Balance supervision  -EM     Position, Sitting Balance sitting in chair  -EM     Static Standing Balance supervision  -EM     Dynamic Standing Balance supervision  -EM       Row Name 01/17/24 0931          Sensory Assessment (Somatosensory)    Sensory Assessment (Somatosensory) sensation intact  -EM               User Key  (r) = Recorded By, (t) = Taken By, (c) = Cosigned By      Initials Name Provider Type    EM Sneha Corbett, PT Physical Therapist                   Goals/Plan    No documentation.                  Clinical Impression       Row Name 01/17/24 0933          Pain    Pretreatment Pain Rating 3/10  -EM     Pain Location - Side/Orientation Right  -EM     Pain Location - flank  -EM     Pre/Posttreatment Pain Comment site of chest tube insertion, pt states it is \"sore\" when she takes a deep breath  -EM     Pain Intervention(s) Medication (See MAR)  -EM       Row Name 01/17/24 0933          Plan of Care Review    Plan of Care Reviewed With patient  -EM     Outcome Evaluation Pt is 67 yo female admitted to [facility] for R lower lobectomy. Pt lives with spouse, independent prior to admission. Today, pateint rates pain at 3/10 level at site of chest tube insertion, sitting up in chair, awake and alert and agreeable to therapy. Patient performed sit to stand independently, ambulated 2 laps around nurses station with supervision, no loss of balance, no shortness of air noted. Patient steady with mobility, encouraged patient to continue to mobilize and ambulate with [facility] staff frequently, no further acute skilled PT needs. d/c plans are home with assist of spouse.  -EM       Row Name 01/17/24 0933          Therapy Assessment/Plan (PT)    Patient/Family Therapy Goals Statement (PT) get better, go home  -EM     Criteria for Skilled Interventions Met (PT) no problems identified which require " skilled intervention  -EM     Therapy Frequency (PT) evaluation only  -EM       Row Name 01/17/24 0933          Vital Signs    Pre SpO2 (%) 93  -EM     O2 Delivery Pre Treatment room air  -EM     Post SpO2 (%) 97  -EM     O2 Delivery Post Treatment room air  -EM       Row Name 01/17/24 0933          Positioning and Restraints    Pre-Treatment Position sitting in chair/recliner  -EM     Post Treatment Position chair  -EM     In Chair sitting;call light within reach;exit alarm on;notified nsg  -EM               User Key  (r) = Recorded By, (t) = Taken By, (c) = Cosigned By      Initials Name Provider Type    EM Sneha Corbett PT Physical Therapist                   Outcome Measures       Row Name 01/17/24 0936          How much help from another person do you currently need...    Turning from your back to your side while in flat bed without using bedrails? 4  -EM     Moving from lying on back to sitting on the side of a flat bed without bedrails? 4  -EM     Moving to and from a bed to a chair (including a wheelchair)? 4  -EM     Standing up from a chair using your arms (e.g., wheelchair, bedside chair)? 4  -EM     Climbing 3-5 steps with a railing? 3  -EM     To walk in hospital room? 3  -EM     AM-PAC 6 Clicks Score (PT) 22  -EM     Highest Level of Mobility Goal 7 --> Walk 25 feet or more  -EM               User Key  (r) = Recorded By, (t) = Taken By, (c) = Cosigned By      Initials Name Provider Type    EM Sneha Corbett PT Physical Therapist                                 Physical Therapy Education       Title: PT OT SLP Therapies (Done)       Topic: Physical Therapy (Done)       Point: Mobility training (Done)       Learning Progress Summary             Patient Acceptance, E, VU by EM at 1/17/2024 0936                                         User Key       Initials Effective Dates Name Provider Type Discipline    EM 06/16/21 -  Sneha Corbett PT Physical Therapist PT                  PT  Recommendation and Plan     Plan of Care Reviewed With: patient  Outcome Evaluation: Pt is 69 yo female admitted to [facility] for R lower lobectomy. Pt lives with spouse, independent prior to admission. Today, pateint rates pain at 3/10 level at site of chest tube insertion, sitting up in chair, awake and alert and agreeable to therapy. Patient performed sit to stand independently, ambulated 2 laps around nurses station with supervision, no loss of balance, no shortness of air noted. Patient steady with mobility, encouraged patient to continue to mobilize and ambulate with nsg staff frequently, no further acute skilled PT needs. d/c plans are home with assist of spouse.     Time Calculation:         PT Charges       Row Name 01/17/24 0937             Time Calculation    Start Time 0916  -EM      Stop Time 0927  -EM      Time Calculation (min) 11 min  -EM      PT Received On 01/17/24  -EM         Time Calculation- PT    Total Timed Code Minutes- PT 8 minute(s)  -EM         Timed Charges    68319 - PT Therapeutic Activity Minutes 8  -EM         Total Minutes    Timed Charges Total Minutes 8  -EM       Total Minutes 8  -EM                User Key  (r) = Recorded By, (t) = Taken By, (c) = Cosigned By      Initials Name Provider Type    EM Sneha Corbett, PT Physical Therapist                  Therapy Charges for Today       Code Description Service Date Service Provider Modifiers Qty    96192182714  PT THERAPEUTIC ACT EA 15 MIN 1/17/2024 Sneha Corbett PT GP 1    [id number]  PT EVAL MOD COMPLEXITY 1 1/17/2024 Sneha Corbett PT GP 1            PT G-Codes  Outcome Measure Options: AM-PAC 6 Clicks Daily Activity (OT)  AM-PAC 6 Clicks Score (PT): 22  AM-PAC 6 Clicks Score (OT): 19  PT Discharge Summary  Anticipated Discharge Disposition (PT): home with assist    Sneha Corbett PT  1/17/2024

## 2024-01-17 NOTE — PLAN OF CARE
Goal Outcome Evaluation:  Plan of Care Reviewed With: patient        Progress: improving  Outcome Evaluation: Patient participated in ADL including toileting and UB dressing, and completed with SBA. Patient performed toileting and hospital floor ambulation without AD, with assist managing chest tube. Patient requires SBA with mobility and ADL transfers. Patient denies pain today, reports some soreness, and itching from torso/back rash, which she states is an allergic reaction from a medication used during surgery. Patient is tolerating mobility and self care well. Patient is performing functional mobility and self care with SBA for safety, and is ready for acute OT dc. Anticipate dc home with spouse, OT to sign off.      Anticipated Discharge Disposition (OT): home, home with home health

## 2024-01-17 NOTE — PLAN OF CARE
Goal Outcome Evaluation:  Plan of Care Reviewed With: patient           Outcome Evaluation: Pt is 67 yo female admitted to [facility] for R lower lobectomy. Pt lives with spouse, independent prior to admission. Today, pateint rates pain at 3/10 level at site of chest tube insertion, sitting up in chair, awake and alert and agreeable to therapy. Patient performed sit to stand independently, ambulated 2 laps around nurses station with supervision, no loss of balance, no shortness of air noted. Patient steady with mobility, encouraged patient to continue to mobilize and ambulate with nsg staff frequently, no further acute skilled PT needs. d/c plans are home with assist of spouse.      Anticipated Discharge Disposition (PT): home with assist

## 2024-01-17 NOTE — PROGRESS NOTES
"    Chief Complaint: Enlarging right pulmonary nodule, right lower lobe  S/P: Complete bronchoscopy, thoracoscopy with right lower lobe wedge resection with completion right lower lobectomy with da Beata robot, intercostal nerve block cryoablation, mediastinal lymph node dissection  POD # 2    Subjective:  Symptoms:  Improved.  No shortness of breath or cough.    Diet:  Adequate intake.  No nausea or vomiting.    Activity level: Returning to normal.    Pain:  She complains of pain that is moderate.  Pain is well controlled.        Vital Signs:  Temp:  [98.2 °F (36.8 °C)-98.7 °F (37.1 °C)] 98.2 °F (36.8 °C)  Heart Rate:  [74-75] 74  Resp:  [16-18] 18  BP: ()/(47-90) 117/68    Intake & Output (last day)         01/16 0701  01/17 0700 01/17 0701  01/18 0700    P.O. 240     I.V. (mL/kg)      IV Piggyback      Total Intake(mL/kg) 240 (3.5)     Urine (mL/kg/hr) 1400 (0.9)     Blood      Chest Tube 530     Total Output 1930     Net -1690           Urine Unmeasured Occurrence 5 x 3 x            Objective:  General Appearance:  Comfortable, well-appearing, in no acute distress and in pain.    Vital signs: (most recent): Blood pressure 117/68, pulse 74, temperature 98.2 °F (36.8 °C), temperature source Oral, resp. rate 18, height 158.8 cm (62.52\"), weight 68 kg (149 lb 14.6 oz), SpO2 96%.    Lungs:  Normal effort and normal respiratory rate.  She is not in respiratory distress.    Heart: Normal rate.  Regular rhythm.    Chest: Symmetric chest wall expansion.   Abdomen: Abdomen is soft and non-distended.    Neurological: Patient is alert and oriented to person, place and time.    Skin:  Warm and dry.              Chest tube:   Site: Right, Clean, Dry, and Intact  Suction: waterseal  Air Leak: negative, wide fluctuation  24 Hour Total: 530ml    Results Review:       I reviewed the patient's new clinical results.  I reviewed the patient's new imaging results and agree with the interpretation.  Discussed with patient, " nurse, Dr. Lomax.   Imaging Results (Last 24 Hours)       Procedure Component Value Units Date/Time    XR Chest 1 View [[id number]] Collected: 01/17/24 1031     Updated: 01/17/24 1035    Narrative:      XR CHEST 1 VW-1/17/2024     HISTORY: Chest tube management.     Right chest tube curls in the right hemithorax terminating in the medial  aspect of the right lower hemithorax. Soft tissue air is seen in the  right hemithorax. There is volume loss of the right lung with some  patchy atelectasis or infiltrate in the right lung base and there still  may be a small medial pneumothorax.     Left lung appears clear.       Impression:      1. The chest appears largely unchanged from yesterday. There may be a  small medial pneumothorax on the right.        This report was finalized on 1/17/2024 10:32 AM by Dr. Alek Shi M.D on Workstation: [id number]       XR Chest 1 View [[id number]] Collected: 01/16/24 1516     Updated: 01/16/24 1520    Narrative:      XR CHEST 1 VW-1/16/2024 at 030 8:00 p.m.     HISTORY: Chest tube management.     Again seen is volume loss of the left mid thorax. There is mild lucency  along the medial margin of the right lung as well as possible pleural  line projecting over the heart which is shifted to the right. This is  suggestive of a small but somewhat atypical appearing right pneumothorax  similar to the earlier study today.     Left lung appears clear. Heart size is within normal limits. Soft tissue  air is seen on the right with a right chest tube curled in the right  hemithorax.       Impression:      1. There still is suggestion of a right pneumothorax similar to the  earlier study today. This could be further evaluated with a CT of the  chest if clinically indicated.     This report was finalized on 1/16/2024 3:17 PM by Dr. Alek Shi M.D on Workstation: [id number]               Lab Results:     Lab Results (last 24 hours)       ** No results found for the last 24 hours. **              Assessment & Plan       Solitary pulmonary nodule    Non-small cell lung cancer     Assessment & Plan  Patient continues to do well postoperatively.  She denies any shortness of breath and is on room air, 2 L as needed.  Plan for overnight oximetry tonight and walking oximetry tomorrow.  Patient reports her pain is well-controlled, continue analgesic medications.  A.m. chest x-ray reviewed,, unchanged from the previous. Her chest tube has drained a moderate amount of thin serosanguineous fluid, volume output too much for removal. Will give one-time dose of IV Lasix and continue chest tube to waterseal. Encourage good pulmonary hygiene, use of I-S. Increase mobility, walks around nurses station. Regarding her rash, suspect this is related to chlorhexidine and her hypersensitivity to tape.  Continue Benadryl cream and as needed hydroxyzine.     TRISH Clarke  [facility]  01/17/24  13:12 EST

## 2024-01-17 NOTE — THERAPY DISCHARGE NOTE
Patient Name: Bernadette Perez  : 1955    MRN: [id number]                              Today's Date: 2024       Admit Date: 1/15/2024    Visit Dx:     ICD-10-CM ICD-9-CM   1. Non-small cell cancer of right lung  C34.91 162.9   2. Solitary pulmonary nodule  R91.1 793.11     Patient Active Problem List   Diagnosis    Arthritis    Allergic rhinitis    Hx of colonic polyps    Family history of diabetes mellitus type II    Family history of heart disease    Hyperlipidemia    Elevated liver enzymes    Gastroesophageal reflux disease    Depression with anxiety    Primary hypertension    Melanoma    Solitary pulmonary nodule    Non-small cell lung cancer     Past Medical History:   Diagnosis Date    Allergic     ALLERGY INJECTIONS - [facility] EVERY 2 WEEKS    Anxiety     Arthritis     Colon polyp     GERD (gastroesophageal reflux disease)     H/O bone density study     dr torres - [facility]    H/O complete eye exam 2016    History of lung biopsy     normal    Hyperlipidemia     Hypertension     Melanoma 06/15/2016    Right mid back    Pulmonary nodule     RIGHT     Past Surgical History:   Procedure Laterality Date    BRONCHOSCOPY WITH ION ROBOTIC ASSIST  2023    Procedure: BRONCHOSCOPY WITH ION ROBOT WITH BAL, BX AND FNA;  Surgeon: Robinson Epperson MD PhD;  Location: [facility] ENDOSCOPY;  Service: Robotics - Pulmonary;;  PRE/POST - lung nodule    CARPAL TUNNEL RELEASE Bilateral     COLONOSCOPY  due    polyps     ENDOSCOPY      EYE SURGERY Right     CORNEA TRANSPLANT    HEMORRHOIDECTOMY      HYSTERECTOMY      LOBECTOMY Right 1/15/2024    Procedure: COMPLETE BRONCHOSCOPY, THORACOSCOPY WITH RIGHT LOWER LOBE WEDGE AND COMPLETION RIGHT LOWER LOBECTOMY WITH DAVINCI ROBOT, INTERCOSTAL NERVE BLOCK CRYO ABLATION, MEDIASTINAL LYMPH NODE DISSECTION;  Surgeon: Robinson Epperson MD PhD;  Location: [facility] MAIN OR;  Service: Robotics - DaVinci;  Laterality: Right;    SKIN CANCER EXCISION      BACK       General Information       Row Name 01/17/24 1616          OT Time and Intention    Document Type discharge treatment  -     Mode of Treatment individual therapy;occupational therapy  -       Row Name 01/17/24 1616          General Information    Patient Profile Reviewed yes  -     Prior Level of Function independent:  -     Existing Precautions/Restrictions fall  -       Row Name 01/17/24 1616          Living Environment    People in Home spouse  -       Row Name 01/17/24 1616          Home Main Entrance    Number of Stairs, Main Entrance two  -       Row Name 01/17/24 1616          Cognition    Orientation Status (Cognition) oriented x 4  -       Row Name 01/17/24 1616          Safety Issues, Functional Mobility    Impairments Affecting Function (Mobility) shortness of breath  -               User Key  (r) = Recorded By, (t) = Taken By, (c) = Cosigned By      Initials Name Provider Type     Krystin Rogers, OT Occupational Therapist                     Mobility/ADL's       Row Name 01/17/24 1616          Bed Mobility    Bed Mobility bed mobility (all) activities  -     All Activities, [place] (Bed Mobility) standby assist  -     Assistive Device (Bed Mobility) bed rails;head of bed elevated  -       Row Name 01/17/24 1616          Transfers    Transfers sit-stand transfer;stand-sit transfer;bed-chair transfer  -       Row Name 01/17/24 1616          Bed-Chair Transfer    Bed-Chair [place] (Transfers) standby assist  -       Row Name 01/17/24 1616          Sit-Stand Transfer    Sit-Stand [place] (Transfers) supervision  -       Row Name 01/17/24 1616          Stand-Sit Transfer    Stand-Sit [place] (Transfers) standby assist  -       Row Name 01/17/24 1616          Functional Mobility    Patient was able to Ambulate yes  -       Row Name 01/17/24 1616          Activities of Daily Living    BADL Assessment/Intervention toileting;upper body dressing  -[facility]  Name 01/17/24 1616          Toileting Assessment/Training    [place] Level (Toileting) toileting skills;adjust/manage clothing;change pad/brief;standby assist  -[facility] Name 01/17/24 1616          Upper Body Dressing Assessment/Training    [place] Level (Upper Body Dressing) upper body dressing skills;doff;don;front opening garment;standby assist  -               User Key  (r) = Recorded By, (t) = Taken By, (c) = Cosigned By      Initials Name Provider Type     Krystin Rogers OT Occupational Therapist                   Obj/Interventions       [facility] Name 01/17/24 1617          Sensory Assessment (Somatosensory)    Sensory Assessment (Somatosensory) sensation intact  -[facility] Name 01/17/24 1617          Range of Motion Comprehensive    General Range of Motion no range of motion deficits identified  -[facility] Name 01/17/24 1617          Strength Comprehensive (MMT)    Comment, General Manual Muscle Testing (MMT) Assessment Generalized weakness  -[facility] Name 01/17/24 1617          Motor Skills    Motor Skills functional endurance  -     Functional Endurance Fair  -[facility] Name 01/17/24 1617          Balance    Balance Assessment sitting static balance;sitting dynamic balance;sit to stand dynamic balance;standing static balance;standing dynamic balance  -     Static Sitting Balance independent  -     Dynamic Sitting Balance independent  -     Position, Sitting Balance sitting in chair;sitting edge of bed  -     Static Standing Balance supervision  -     Dynamic Standing Balance supervision  -     Position/Device Used, Standing Balance unsupported  -     Balance Interventions sitting;standing;occupation based/functional task  -               User Key  (r) = Recorded By, (t) = Taken By, (c) = Cosigned By      Initials Name Provider Type     Krystin Rogers OT Occupational Therapist                   Goals/Plan    No documentation.                  Clinical Impression        Row Name 01/17/24 1617          Pain Assessment    Pretreatment Pain Rating 0/10 - no pain  -     Posttreatment Pain Rating 0/10 - no pain  -     Pre/Posttreatment Pain Comment No complain of pain today, patient has rash over torso, which she reports is very itchy  -LH       Row Name 01/17/24 1617          Plan of Care Review    Plan of Care Reviewed With patient  -     Progress improving  -     Outcome Evaluation Patient participated in ADL including toileting and UB dressing, and completed with SBA. Patient performed toileting and hospital floor ambulation without AD, with assist managing chest tube. Patient requires SBA with mobility and ADL transfers. Patient denies pain today, reports some soreness, and itching from torso/back rash, which she states is an allergic reaction from a medication used during surgery. Patient is tolerating mobility and self care well. Patient is performing functional mobility and self care with SBA for safety, and is ready for acute OT dc. Anticipate dc home with spouse, OT to sign off.  -LH       Row Name 01/17/24 1617          Therapy Assessment/Plan (OT)    Criteria for Skilled Therapeutic Interventions Met (OT) no problems identified which require skilled intervention  -     Predicted Duration of Therapy Intervention (OT) OT discharge  -LH       Row Name 01/17/24 1617          Therapy Plan Review/Discharge Plan (OT)    Anticipated Discharge Disposition (OT) home;home with home health  -[facility] 01/17/24 1617          Positioning and Restraints    Pre-Treatment Position in bed  -     Post Treatment Position chair  -     In Chair sitting;call light within reach;encouraged to call for assist;exit alarm on  -               User Key  (r) = Recorded By, (t) = Taken By, (c) = Cosigned By      Initials Name Provider Type     Krystin Rogers, OT Occupational Therapist                   Outcome Measures       [facility] Name 01/17/24 1627          How much help from  another is currently needed...    Putting on and taking off regular lower body clothing? 4  -LH     Bathing (including washing, rinsing, and drying) 4  -LH     Toileting (which includes using toilet bed pan or urinal) 4  -LH     Putting on and taking off regular upper body clothing 4  -LH     Taking care of personal grooming (such as brushing teeth) 4  -LH     Eating meals 4  -     AM-PAC 6 Clicks Score (OT) 24  -       Row Name 01/17/24 0936 01/17/24 0800       How much help from another person do you currently need...    Turning from your back to your side while in flat bed without using bedrails? 4  -EM 4  -TS    Moving from lying on back to sitting on the side of a flat bed without bedrails? 4  -EM 4  -TS    Moving to and from a bed to a chair (including a wheelchair)? 4  -EM 4  -TS    Standing up from a chair using your arms (e.g., wheelchair, bedside chair)? 4  -EM 4  -TS    Climbing 3-5 steps with a railing? 3  -EM 3  -TS    To walk in hospital room? 3  -EM 3  -TS    AM-PAC 6 Clicks Score (PT) 22  -EM 22  -TS    Highest Level of Mobility Goal 7 --> Walk 25 feet or more  -EM 7 --> Walk 25 feet or more  -TS      Row Name 01/17/24 1620          Functional Assessment    Outcome Measure Options AM-PAC 6 Clicks Daily Activity (OT)  -               User Key  (r) = Recorded By, (t) = Taken By, (c) = Cosigned By      Initials Name Provider Type    EM Sneha Corbett, PT Physical Therapist    Jade Castillo, RN Registered Nurse     Krystin Rogers, OT Occupational Therapist                    Occupational Therapy Education       Title: PT OT SLP Therapies (Done)       Topic: Occupational Therapy (Done)       Point: ADL training (Done)       Description:   Instruct learner(s) on proper safety adaptation and remediation techniques during self care or transfers.   Instruct in proper use of assistive devices.                  Learning Progress Summary             Patient Acceptance, E,TB, VU by  at  1/16/2024 1533    Comment: Instructed in role of OT, discharge plan, paced breathing                         Point: Home exercise program (Done)       Description:   Instruct learner(s) on appropriate technique for monitoring, assisting and/or progressing therapeutic exercises/activities.                  Learning Progress Summary             Patient Acceptance, E,TB, VU by  at 1/16/2024 1533    Comment: Instructed in role of OT, discharge plan, paced breathing                         Point: Precautions (Done)       Description:   Instruct learner(s) on prescribed precautions during self-care and functional transfers.                  Learning Progress Summary             Patient Acceptance, E,TB, VU by  at 1/16/2024 1533    Comment: Instructed in role of OT, discharge plan, paced breathing                         Point: Body mechanics (Done)       Description:   Instruct learner(s) on proper positioning and spine alignment during self-care, functional mobility activities and/or exercises.                  Learning Progress Summary             Patient Acceptance, E,TB, VU by  at 1/16/2024 1533    Comment: Instructed in role of OT, discharge plan, paced breathing                                         User Key       Initials Effective Dates Name Provider Type Discipline     08/31/23 -  Krystin Rogers OT Occupational Therapist OT                  OT Recommendation and Plan  Planned Therapy Interventions (OT): activity tolerance training, BADL retraining, functional balance retraining, occupation/activity based interventions, patient/caregiver education/training, strengthening exercise, transfer/mobility retraining  Therapy Frequency (OT): 5 times/wk  Plan of Care Review  Plan of Care Reviewed With: patient  Progress: improving  Outcome Evaluation: Patient participated in ADL including toileting and UB dressing, and completed with SBA. Patient performed toileting and hospital floor ambulation without AD, with  assist managing chest tube. Patient requires SBA with mobility and ADL transfers. Patient denies pain today, reports some soreness, and itching from torso/back rash, which she states is an allergic reaction from a medication used during surgery. Patient is tolerating mobility and self care well. Patient is performing functional mobility and self care with SBA for safety, and is ready for acute OT dc. Anticipate dc home with spouse, OT to sign off.     Time Calculation:   Evaluation Complexity (OT)  Review Occupational Profile/Medical/Therapy History Complexity: expanded/moderate complexity  Assessment, Occupational Performance/Identification of Deficit Complexity: 3-5 performance deficits  Clinical Decision Making Complexity (OT): detailed assessment/moderate complexity  Overall Complexity of Evaluation (OT): moderate complexity     Time Calculation- OT       Row Name 01/17/24 1620             Time Calculation- OT    OT Start Time 1549  -      OT Stop Time 1614  -      OT Time Calculation (min) 25 min  -      Total Timed Code Minutes- OT 25 minute(s)  -      OT Received On 01/17/24  -         Timed Charges    72329 - OT Therapeutic Activity Minutes 10  -LH      23889 - OT Self Care/Mgmt Minutes 15  -         Total Minutes    Timed Charges Total Minutes 25  -       Total Minutes 25  -                User Key  (r) = Recorded By, (t) = Taken By, (c) = Cosigned By      Initials Name Provider Type     Krystin Rogers OT Occupational Therapist                  Therapy Charges for Today       Code Description Service Date Service Provider Modifiers Qty    49636628336 HC OT EVAL MOD COMPLEXITY 3 1/16/2024 Krystin Rogers OT GO 1    95981336786 HC OT THERAPEUTIC ACT EA 15 MIN 1/17/2024 Krystin Rogers OT GO 1    95329156959 HC OT SELF CARE/MGMT/TRAIN EA 15 MIN 1/17/2024 Krystin Rogers OT GO 1                 Krystin Rogers OT  1/17/2024

## 2024-01-18 ENCOUNTER — READMISSION MANAGEMENT (OUTPATIENT)
Dept: CALL CENTER | Facility: HOSPITAL | Age: 69
End: 2024-01-18
Payer: MEDICARE

## 2024-01-18 ENCOUNTER — APPOINTMENT (OUTPATIENT)
Dept: GENERAL RADIOLOGY | Facility: HOSPITAL | Age: 69
End: 2024-01-18
Payer: MEDICARE

## 2024-01-18 VITALS
WEIGHT: 149.91 LBS | BODY MASS INDEX: 26.56 KG/M2 | SYSTOLIC BLOOD PRESSURE: 90 MMHG | OXYGEN SATURATION: 94 % | HEART RATE: 96 BPM | TEMPERATURE: 98.5 F | HEIGHT: 63 IN | DIASTOLIC BLOOD PRESSURE: 71 MMHG | RESPIRATION RATE: 16 BRPM

## 2024-01-18 LAB
LAB AP CASE REPORT: NORMAL
LAB AP DIAGNOSIS COMMENT: NORMAL
LAB AP SPECIAL STAINS: NORMAL
LAB AP SYNOPTIC CHECKLIST: NORMAL
Lab: NORMAL
PATH REPORT.FINAL DX SPEC: NORMAL
PATH REPORT.GROSS SPEC: NORMAL

## 2024-01-18 PROCEDURE — 25010000002 ENOXAPARIN PER 10 MG: Performed by: STUDENT IN AN ORGANIZED HEALTH CARE EDUCATION/TRAINING PROGRAM

## 2024-01-18 PROCEDURE — 71045 X-RAY EXAM CHEST 1 VIEW: CPT

## 2024-01-18 PROCEDURE — 94799 UNLISTED PULMONARY SVC/PX: CPT

## 2024-01-18 PROCEDURE — 94618 PULMONARY STRESS TESTING: CPT

## 2024-01-18 RX ORDER — DIPHENHYDRAMINE HCL 25 MG
25 CAPSULE ORAL EVERY 8 HOURS PRN
Qty: 20 CAPSULE | Refills: 0 | Status: SHIPPED | OUTPATIENT
Start: 2024-01-18 | End: 2024-01-25

## 2024-01-18 RX ADMIN — PAROXETINE HYDROCHLORIDE HEMIHYDRATE 20 MG: 20 TABLET, FILM COATED ORAL at 06:26

## 2024-01-18 RX ADMIN — LOSARTAN POTASSIUM 50 MG: 50 TABLET, FILM COATED ORAL at 08:03

## 2024-01-18 RX ADMIN — ENOXAPARIN SODIUM 40 MG: 100 INJECTION SUBCUTANEOUS at 08:03

## 2024-01-18 RX ADMIN — ACETAMINOPHEN 1000 MG: 500 TABLET ORAL at 08:03

## 2024-01-18 RX ADMIN — GABAPENTIN 300 MG: 300 CAPSULE ORAL at 06:26

## 2024-01-18 RX ADMIN — TRIAMCINOLONE ACETONIDE: 1 CREAM TOPICAL at 08:03

## 2024-01-18 RX ADMIN — DOCUSATE SODIUM 100 MG: 100 CAPSULE, LIQUID FILLED ORAL at 08:03

## 2024-01-18 RX ADMIN — OXYCODONE HYDROCHLORIDE 5 MG: 5 TABLET ORAL at 00:32

## 2024-01-18 NOTE — OUTREACH NOTE
Prep Survey      Flowsheet Row Responses   [facility] patient discharged from? [facility]   Is LACE score < 7 ? No   Eligibility [facility]   Date of Admission 01/15/24   Date of Discharge 01/18/24   Discharge Disposition Home or Self Care   Discharge diagnosis COMPLETE BRONCHOSCOPY, THORACOSCOPY WITH RIGHT LOWER LOBE WEDGE AND COMPLETION RIGHT LOWER LOBECTOMY   Does the patient have one of the following disease processes/diagnoses(primary or secondary)? Cardiothoracic surgery   Does the patient have Home health ordered? No   Is there a DME ordered? Yes   What DME was ordered? O2 from [place]   Prep survey completed? Yes            Ekaterina FRANKLIN - Registered Nurse

## 2024-01-18 NOTE — PROGRESS NOTES
Exercise Oximetry    Patient Name:Bernadette Perez   MRN: [id number]   Date: 01/18/24             ROOM AIR BASELINE   SpO2% 94   Heart Rate 80   Blood Pressure      EXERCISE ON ROOM AIR SpO2% EXERCISE ON O2 @  LPM SpO2%   1 MINUTE 93 1 MINUTE    2 MINUTES 92 2 MINUTES    3 MINUTES 92 3 MINUTES    4 MINUTES 91 4 MINUTES    5 MINUTES 90 5 MINUTES    6 MINUTES 91 6 MINUTES               Distance Walked  120 Distance Walked   Dyspnea (Rolando Scale)  3 Dyspnea (Rolando Scale)   Fatigue (Rolando Scale)  2 Fatigue (Rolando Scale)   SpO2% Post Exercise  95 SpO2% Post Exercise   HR Post Exercise  78 HR Post Exercise   Time to Recovery   Time to Recovery     Comments: Walked with pt around nurses station. Pt was on RA throughout walk and at rest in room. Sats above 90%

## 2024-01-18 NOTE — PLAN OF CARE
Problem: Adult Inpatient Plan of Care  Goal: Plan of Care Review  Outcome: Ongoing, Progressing  Flowsheets (Taken 1/17/2024 1945)  Progress: improving  Plan of Care Reviewed With: patient  Goal: Patient-Specific Goal (Individualized)  Outcome: Ongoing, Progressing  Goal: Absence of Hospital-Acquired Illness or Injury  Outcome: Ongoing, Progressing  Intervention: Identify and Manage Fall Risk  Recent Flowsheet Documentation  Taken 1/17/2024 1800 by Jade Iglesias RN  Safety Promotion/Fall Prevention:   activity supervised   assistive device/personal items within reach   clutter free environment maintained   fall prevention program maintained   nonskid shoes/slippers when out of bed  Taken 1/17/2024 1600 by Jade Iglesias RN  Safety Promotion/Fall Prevention:   activity supervised   assistive device/personal items within reach   clutter free environment maintained   fall prevention program maintained   safety round/check completed  Taken 1/17/2024 1400 by Jade Iglesias RN  Safety Promotion/Fall Prevention:   assistive device/personal items within reach   activity supervised   clutter free environment maintained   fall prevention program maintained   nonskid shoes/slippers when out of bed   safety round/check completed  Taken 1/17/2024 1200 by Jade Iglesias RN  Safety Promotion/Fall Prevention:   activity supervised   assistive device/personal items within reach   clutter free environment maintained   fall prevention program maintained   nonskid shoes/slippers when out of bed   safety round/check completed  Taken 1/17/2024 1000 by Jade Iglesias RN  Safety Promotion/Fall Prevention:   activity supervised   assistive device/personal items within reach   clutter free environment maintained   fall prevention program maintained   nonskid shoes/slippers when out of bed   safety round/check completed  Taken 1/17/2024 0800 by Jade Iglesias RN  Safety Promotion/Fall Prevention:   assistive device/personal  items within reach   activity supervised   clutter free environment maintained   fall prevention program maintained   nonskid shoes/slippers when out of bed   safety round/check completed  Intervention: Prevent and Manage VTE (Venous Thromboembolism) Risk  Recent Flowsheet Documentation  Taken 1/17/2024 1800 by Jade Iglesias RN  Activity Management: activity encouraged  Taken 1/17/2024 1600 by Jade Iglesias RN  Activity Management: back to bed  Taken 1/17/2024 1400 by Jade Iglesias RN  Activity Management: activity encouraged  Taken 1/17/2024 1200 by Jade Iglesias RN  Activity Management: activity encouraged  Taken 1/17/2024 1000 by Jade Iglesias RN  Activity Management: up in chair  Taken 1/17/2024 0800 by Jade Iglesias RN  Activity Management: activity encouraged  Intervention: Prevent Infection  Recent Flowsheet Documentation  Taken 1/17/2024 1800 by Jade Iglesias RN  Infection Prevention: single patient room provided  Taken 1/17/2024 1600 by Jade Iglesias RN  Infection Prevention: single patient room provided  Taken 1/17/2024 1400 by Jade Iglesias RN  Infection Prevention: single patient room provided  Taken 1/17/2024 1200 by Jade Iglesias RN  Infection Prevention: single patient room provided  Taken 1/17/2024 1000 by Jade Iglesias RN  Infection Prevention: single patient room provided  Taken 1/17/2024 0800 by Jade Iglesias RN  Infection Prevention: single patient room provided  Goal: Optimal Comfort and Wellbeing  Outcome: Ongoing, Progressing  Intervention: Monitor Pain and Promote Comfort  Recent Flowsheet Documentation  Taken 1/17/2024 0800 by Jade Iglesias RN  Pain Management Interventions: see MAR  Intervention: Provide Person-Centered Care  Recent Flowsheet Documentation  Taken 1/17/2024 0800 by Jade Iglesias RN  Trust Relationship/Rapport:   care explained   choices provided  Goal: Readiness for Transition of Care  Outcome: Ongoing, Progressing   Goal Outcome  Evaluation:  Plan of Care Reviewed With: patient        Progress: improving

## 2024-01-18 NOTE — DISCHARGE PLACEMENT REQUEST
"Sonja Radford (68 y.o. Female)       Date of Birth   1955    Social Security Number       Address   [address] [place], [place] [address]    Home Phone   [phone]    MRN   [id number]       [facility]    Marital Status                               Admission Date   1/15/24    Admission Type   Elective    Admitting Provider   Robinson Epperson MD PhD    Attending Provider   Robinson Epperson MD PhD    Department, Room/Bed   [address], E558/1       Discharge Date       Discharge Disposition   Home or Self Care    Discharge Destination                                 Attending Provider: Robinson Epperson MD PhD    Allergies: Celebrex [Celecoxib], Codeine, Mobic [Meloxicam], Penicillins, Chlorhexidine, Contrast Dye (Echo Or Unknown Ct/mr), Tape    Isolation: None   Infection: None   Code Status: CPR    Ht: 158.8 cm (62.52\")   Wt: 68 kg (149 lb 14.6 oz)    Admission Cmt: None   Principal Problem: Solitary pulmonary nodule [R91.1]                   Active Insurance as of 1/15/2024       Primary Coverage       Payor Plan Insurance Group Employer/Plan Group    MEDICARE MEDICARE A & B        Payor Plan Address Payor Plan Phone Number Payor Plan Fax Number Effective Dates    [address] [phone]  12/1/2021 - None Entered    [facility] [id number]         Subscriber Name Subscriber Birth Date Member ID       SONJA RADFORD 1955 [id number]               Secondary Coverage       Payor Plan Insurance Group Employer/Plan Group    [facility] BLUE CROSS [facility] [place] [facility] SUPP [id number]       Payor Plan Address Payor Plan Phone Number Payor Plan Fax Number Effective Dates    [address]   12/1/2021 - None Entered    [facility] [id number]         Subscriber Name Subscriber Birth Date Member ID       SONJA RADFORD 1955 [id number]                     Emergency Contacts        (Rel.) Home Phone Work Phone Mobile Phone    COLLEEN LOTT (\"VIH-NEE-TA\") " "(Sister) [phone] -- --    [place] RADFORDEW (Grandchild) [phone] -- [phone]    Harmeet Radford (Spouse) -- -- [phone]              Emergency Contact Information       Name Relation Home Work Mobile    COLLEEN LOTT (\"VIH-NEE-TA\") Sister [phone]      MALINAAMEYA Grandchild [phone]    Harmeet Radford Spouse   [phone]          " Stable

## 2024-01-18 NOTE — CASE MANAGEMENT/SOCIAL WORK
Case Management Discharge Note      Final Note: Pt discharged home with nighttime O2 from [place].         Selected Continued Care - Discharged on 1/18/2024 Admission date: 1/15/2024 - Discharge disposition: Home or Self Care      Destination    No services have been selected for the patient.                Durable Medical Equipment       Service Provider Selected Services Address Phone Fax Patient Preferred    [facility] [address] [address] [phone] --              Dialysis/Infusion    No services have been selected for the patient.                Home Medical Care    No services have been selected for the patient.                Therapy    No services have been selected for the patient.                Community Resources    No services have been selected for the patient.                Community & DME    No services have been selected for the patient.                    Transportation Services  Private: Car    Final Discharge Disposition Code: 01 - home or self-care

## 2024-01-18 NOTE — PLAN OF CARE
Goal Outcome Evaluation:  Plan of Care Reviewed With: patient        Progress: improving  Outcome Evaluation: VSS, no complaints of pain. Chest tube removed by thoracic. Plan to DC home.

## 2024-01-19 ENCOUNTER — TRANSITIONAL CARE MANAGEMENT TELEPHONE ENCOUNTER (OUTPATIENT)
Dept: CALL CENTER | Facility: HOSPITAL | Age: 69
End: 2024-01-19
Payer: MEDICARE

## 2024-01-19 NOTE — OUTREACH NOTE
Call Center TCM Note      Flowsheet Row Responses   [facility] patient discharged from? [place]   Does the patient have one of the following disease processes/diagnoses(primary or secondary)? Cardiothoracic surgery   TCM attempt successful? Yes   Call start time 0902   Call end time 0905   Discharge diagnosis COMPLETE BRONCHOSCOPY, THORACOSCOPY WITH RIGHT LOWER LOBE WEDGE AND COMPLETION RIGHT LOWER LOBECTOMY   Meds reviewed with patient/caregiver? Yes   Is the patient having any side effects they believe may be caused by any medication additions or changes? No   Does the patient have all medications related to this admission filled (includes all antibiotics, pain medications, cardiac medications, etc.) Yes   Is the patient taking all medications as directed (includes completed medication regime)? Yes   Does the patient have an appointment with their PCP within 7-14 days of discharge? No   Nursing Interventions Patient declined scheduling/rescheduling appointment at this time   Has home health visited the patient within 72 hours of discharge? N/A   What DME was ordered? O2 from [facility]   Has all DME been delivered? Yes   Psychosocial issues? No   Did the patient receive a copy of their discharge instructions? Yes   Nursing interventions Reviewed instructions with patient   What is the patient's perception of their health status since discharge? Improving   Nursing interventions Nurse provided patient education   Is the patient/caregiver able to teach back normal signs of recovery? Nausea and lack of appetite   Nursing interventions Reassured on normal signs of recovery   Is the patient /caregiver able to teach back basic post-op care? Drive as instructed by MD in discharge instructions, No tub bath, swimming, or hot tub until instructed by MD, Shower daily, Use a clean wash cloth and antibacterial bar or liquid soap to clean incisions   Is the patient/caregiver able to teach back signs and symptoms of  incisional infection? Increased redness, swelling or pain at the incisonal site, Increased drainage or bleeding, Incisional warmth, Pus or odor from incision, Fever   Is the patient/caregiver able to teach back steps to recovery at home? Set small, achievable goals for return to baseline health, Rest and rebuild strength, gradually increase activity, Make a list of questions for surgeon's appointment   If the patient is a current smoker, are they able to teach back resources for cessation? Not a smoker   Is the patient/caregiver able to teach back the hierarchy of who to call/visit for symptoms/problems? PCP, Specialist, Home health nurse, Urgent Care, ED, 911 Yes   TCM call completed? Yes   Call end time 0905   Would this patient benefit from a Referral to [facility] Social Work? No   Is the patient interested in additional calls from an ambulatory ? No            Zoila Varela LPN    1/19/2024, 09:16 EST

## 2024-01-22 ENCOUNTER — PATIENT OUTREACH (OUTPATIENT)
Dept: OTHER | Facility: HOSPITAL | Age: 69
End: 2024-01-22
Payer: MEDICARE

## 2024-01-22 DIAGNOSIS — C34.91 NON-SMALL CELL CANCER OF RIGHT LUNG: Primary | ICD-10-CM

## 2024-01-26 ENCOUNTER — READMISSION MANAGEMENT (OUTPATIENT)
Dept: CALL CENTER | Facility: HOSPITAL | Age: 69
End: 2024-01-26
Payer: MEDICARE

## 2024-01-26 NOTE — OUTREACH NOTE
"CT Surgery Week 2 Survey      Flowsheet Row Responses   [facility] patient discharged from? [place]   Does the patient have one of the following disease processes/diagnoses(primary or secondary)? Cardiothoracic surgery   Week 2 attempt successful? Yes   Call start time 1500   Call end time 1505   Meds reviewed with patient/caregiver? Yes   Is the patient having any side effects they believe may be caused by any medication additions or changes? No   Does the patient have all medications related to this admission filled (includes all antibiotics, pain medications, cardiac medications, etc.) Yes   Is the patient taking all medications as directed (includes completed medication regime)? Yes   Comments regarding appointments Surgeon appt 01/31/24.   Does the patient have a primary care provider?  Yes   Does the patient have an appointment scheduled with their C/T surgeon? Yes   Has the patient kept scheduled appointments due by today? N/A   Has home health visited the patient within 72 hours of discharge? N/A   Has all DME been delivered? Yes   DME comments States wearing O2 at night. States O2 sats are staying \"good\".   Psychosocial issues? No   Did the patient receive a copy of their discharge instructions? Yes   Nursing interventions Reviewed instructions with patient   What is the patient's perception of their health status since discharge? Improving   Nursing interventions Nurse provided patient education   Is the patient/caregiver able to teach back signs and symptoms of incisional infection? Increased redness, swelling or pain at the incisonal site, Increased drainage or bleeding, Incisional warmth, Pus or odor from incision, Fever   Is the patient/caregiver able to teach back the hierarchy of who to call/visit for symptoms/problems? PCP, Specialist, Home health nurse, Urgent Care, ED, 911 Yes   Week 2 call completed? Yes   Graduated Yes   Is the patient interested in additional calls from an ambulatory " ? No   Would this patient benefit from a Referral to [facility] Social Work? No   Wrap up additional comments States is doing well. Denies any s/s of infection at incision. States recovery has gone much smoother than anticipated. Denies any needs/concerns today. States plans to return to work when released by surgeon.   Call end time 1505            Rose LOPEZ - Registered Nurse

## 2024-01-29 LAB
LAB AP CASE REPORT: NORMAL
LAB AP DIAGNOSIS COMMENT: NORMAL
LAB AP SPECIAL STAINS: NORMAL
LAB AP SYNOPTIC CHECKLIST: NORMAL
Lab: NORMAL
MYCOBACTERIUM SPEC CULT: NORMAL
NIGHT BLUE STAIN TISS: NORMAL
PATH REPORT.ADDENDUM SPEC: NORMAL
PATH REPORT.FINAL DX SPEC: NORMAL
PATH REPORT.GROSS SPEC: NORMAL

## 2024-01-31 ENCOUNTER — PATIENT OUTREACH (OUTPATIENT)
Dept: OTHER | Facility: HOSPITAL | Age: 69
End: 2024-01-31
Payer: MEDICARE

## 2024-01-31 ENCOUNTER — HOSPITAL ENCOUNTER (OUTPATIENT)
Dept: GENERAL RADIOLOGY | Facility: HOSPITAL | Age: 69
Discharge: HOME | End: 2024-01-31
Admitting: NURSE PRACTITIONER
Payer: MEDICARE

## 2024-01-31 ENCOUNTER — OFFICE VISIT (OUTPATIENT)
Dept: SURGERY | Facility: CLINIC | Age: 69
End: 2024-01-31
Payer: MEDICARE

## 2024-01-31 VITALS
HEART RATE: 56 BPM | HEIGHT: 63 IN | OXYGEN SATURATION: 98 % | SYSTOLIC BLOOD PRESSURE: 140 MMHG | BODY MASS INDEX: 26.4 KG/M2 | DIASTOLIC BLOOD PRESSURE: 96 MMHG | WEIGHT: 149 LBS

## 2024-01-31 DIAGNOSIS — C34.91 NON-SMALL CELL CANCER OF RIGHT LUNG: Primary | ICD-10-CM

## 2024-01-31 DIAGNOSIS — C34.91 NON-SMALL CELL CANCER OF RIGHT LUNG: ICD-10-CM

## 2024-01-31 PROCEDURE — 71046 X-RAY EXAM CHEST 2 VIEWS: CPT

## 2024-01-31 NOTE — PROGRESS NOTES
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  Subjective        Bernadette Perez presents to [facility] THORACIC SURGERY  History of Present Illness  Ms. Perez is a pleasant 68-year-old lady who presents today for her first postoperative follow-up after undergoing a robotic assisted lower lobe wedge followed by completion lobectomy for non-small cell lung cancer.  She did well postoperatively, she did have a localized skin rash likely due to the Ioband drape used intraoperatively.  Her rash has improved since she was discharged from the hospital.  She denies any shortness of breath.  She denies any fevers or chills.  She is slowly regaining her strength.  Her incisions are healing nicely.  She is complaining of some mild incisional pain.    Objective   Vital Signs:  /96 (BP Location: Left arm, Patient Position: Sitting, Cuff Size: Adult)   Pulse 56   Ht 158.8 cm (62.52\")   Wt 67.6 kg (149 lb)   SpO2 98%   BMI 26.80 kg/m²   Estimated body mass index is 26.8 kg/m² as calculated from the following:    Height as of this encounter: 158.8 cm (62.52\").    Weight as of this encounter: 67.6 kg (149 lb).               Physical Exam  Constitutional:       Appearance: Normal appearance.   Cardiovascular:      Rate and Rhythm: Normal rate and regular rhythm.      Pulses: Normal pulses.      Heart sounds: Normal heart sounds.   Pulmonary:      Effort: Pulmonary effort is normal.      Comments: Mild decrease respirations in the right base.  Neurological:      Mental Status: She is alert.        Result Review :    Chest x-ray from today was visualized and reviewed.  She has a moderate-sized right basilar effusion.           Assessment and Plan     Diagnoses and all orders for this visit:    1. " Non-small cell cancer of right lung (Primary)  -     CT Chest Without Contrast Diagnostic; Future    Ms. 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  overall she is doing very well.  On today's chest x-ray she does have a mild to moderate size right-sided effusion.  She denies shortness of breath with exertion.  She is asymptomatic.  She satting 98% on room air, I would opt to continue to monitor this effusion.  Her skin rash has almost completely resolved since she was in the hospital.  In regards to continued surveillance, I would like to see her back in 3 months with a noncontrasted CT scan of the chest.  Based on her tumor stage, is not recommended undergoing adjuvant therapy.         I spent 20 minutes caring for Bernadette on this date of service. This time includes time spent by me in the following activities:preparing for the visit, reviewing tests, obtaining and/or reviewing a separately obtained history, performing a medically appropriate examination and/or evaluation , counseling and educating the patient/family/caregiver, ordering medications, tests, or procedures, referring and communicating with other health care professionals , documenting information in the medical record, independently interpreting results and communicating that information with the patient/family/caregiver, and care coordination  Follow Up     No follow-ups on file.  Patient was given instructions and counseling regarding her condition or for health maintenance advice. Please see specific information pulled into the AVS if appropriate.

## 2024-01-31 NOTE — PROGRESS NOTES
Referral rec'd from Dr. Epperson. I accompanied patient to her 2 week pos-op appt with Dr. Epperson today.  Introduced myself and explained navigational services.    The patient was referred to Dr. Epperson for an enlarging right lower lobe pulmonary nodule. Her ION bronchoscopy on 12/18/23 was non-diagnostic.  The patient was hospitalized 1/15-1/18/24 and underwent right robotic assisted lower lobe wedge resection with completion lobectomy and mediastinal lymph node dissection on 1/15/24.  She progressed well and discharged home on 1/18/24 with supplemental oxygen at night through [facility].     Dr. Epperson reviewed the patient's pathology which revealed pT1b pN0 invasive well to moderately differentiated mucinous adenocarcinoma.  He also reviewed her chest xray, which revealed a pleural effusion. The patient denies increased shortness of breath.  She was encouraged to contact Dr. Epperson's office if she developed shortness of breath or pain. The patient developed a rash post surgery, which is improving.      We discussed common post-op symptoms and possible duration of those symptoms.  Patient verbalized understanding.  Dr. Epperson ordered a f/u CT scan in 3 months; the patient is scheduled for an appt with Dr. Epperson on 4/24. The patient has a good understanding of her pathology and treatment options.  She was able to teach back of her plan of care.    The patient is alert and oriented. She ambulates without assistive devices, denies falls and is independent with ADLs. The patient lives with her  in [place], [place].  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 Dr. Epperson's office will provide return to work paperwork.     The patient is a never smoker.    The patient has Medicare and [place].  She denies any current BHE claim issues. We discussed financial navigation, cost estimates and possible financial assistance if needed in the future.     The patient denies transportation, financial or other resource needs at  this time.     The patient has been eating well and denies weight loss.    The patient does not have an ACP on file; she declined additional ACP information.    The patient has a PMH of melanoma on her right mid back, removed 6/15/16. She reports a family history of ovarian cancer (mother); she reports several cousins also had cancer although she does not know the cancer type.      The patient is coping well with her diagnosis. She reports an adequate support system.  We discussed OSW and Behavioral oncology services.  Patient expressed gratitude for my support and denied any additional needs at this time. We also discussed [employer] and [employer]. The patient declined referrals to either organization at this time.    We discussed integrative therapies and other services at the [facility]. She received a navigation folder with the following information at her appointment today: [facility] Cancer Support [facility], Cancer and Restorative Exercise - CARE, [employer] exercise program, Living with a Diagnosis of Lung Cancer, Lung Cancer [place] Support Services, Cancer Resource Agenda, Message Therapy, Reiki Therapy, [facility] [place], Cancer Nutrition, Smoking Cessation and Survivorship Clinic.      I will call in a few weeks; provided my name and number and encouraged patient to call if any needs arise.

## 2024-02-13 ENCOUNTER — PATIENT OUTREACH (OUTPATIENT)
Dept: OTHER | Facility: HOSPITAL | Age: 69
End: 2024-02-13
Payer: MEDICARE

## 2024-02-21 ENCOUNTER — TELEPHONE (OUTPATIENT)
Dept: SURGERY | Facility: CLINIC | Age: 69
End: 2024-02-21
Payer: MEDICARE

## 2024-02-21 NOTE — PROGRESS NOTES
"Chief Complaint  Follow up, RLL lobectomy     Subjective        Bernadette Perez presents to [facility] THORACIC SURGERY  History of Present Illness    Ms. Samson is a very pleasant 68-year-old lady who is status post right lower lobectomy by Dr. Epperson on 1/15/2024 for T1b N0 adenocarcinoma.  She was recently seen in the office last month for her postop follow-up appointment and was doing well.  She called our office with increased tenderness and swelling to the right flank under her breast and was advised to come in for further evaluation.      She denies new onset shortness of breath.  She has a postnasal drip and has noticed some intermittent wheezing over the past few days.  No fever, night sweats or chills.  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  She took her last dose of gabapentin over the weekend.  She remains active but is curious why the right side of her chest is causing her some discomfort.  He presents today with an x-ray.      Objective   Vital Signs:  /64 (BP Location: Left arm, Patient Position: Sitting, Cuff Size: Adult)   Pulse 68   Ht 158.8 cm (62.52\")   Wt 68.9 kg (152 lb)   SpO2 97%   BMI 27.34 kg/m²   Estimated body mass index is 27.34 kg/m² as calculated from the following:    Height as of this encounter: 158.8 cm (62.52\").    Weight as of this encounter: 68.9 kg (152 lb).               Physical Exam  Constitutional:       General: She is not in acute distress.     Appearance: Normal appearance. She is not ill-appearing.   HENT:      Head: Normocephalic and atraumatic.   Cardiovascular:      Rate and Rhythm: Normal rate.   Pulmonary:      Effort: Pulmonary effort is normal. No respiratory distress.   Chest:      Chest wall: Tenderness present. No lacerations, crepitus or edema.          Comments: Mild tenderness to right lateral chest in accordance with dermatome associated with surgery from VATS.  No evidence of infection  Musculoskeletal:         " General: Normal range of motion.      Cervical back: Normal range of motion and neck supple.   Skin:     General: Skin is warm.   Neurological:      General: No focal deficit present.      Mental Status: She is alert.   Psychiatric:         Mood and Affect: Mood normal.         Thought Content: Thought content normal.        Result Review :            Chest x-ray performed prior to her appointment today demonstrates persistent right-sided moderate pleural effusion.         Assessment and Plan     Diagnoses and all orders for this visit:    1. Non-small cell cancer of right lung  -     gabapentin (NEURONTIN) 300 MG capsule; Take 1 capsule by mouth Every 8 (Eight) Hours for 90 days.  Dispense: 90 capsule; Refill: 2    Other orders  -     albuterol sulfate  (90 Base) MCG/ACT inhaler; Inhale 2 puffs Every 4 (Four) Hours As Needed for Wheezing or Shortness of Air.  Dispense: 18 g; Refill: 0    MsFlorentino Perez is approximately 5 weeks out from right robot-assisted lower lobectomy.  She has some discomfort to her right flank, which is not unexpected in the postoperative period.  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  I have sent in a refill of this medication to be taken as directed.  I have also sent her an albuterol inhaler to take as needed for wheezing.  Reassurance and education provided today.  She will follow-up as scheduled with Dr. Epperson in April 2024 with CT chest for lung cancer surveillance.       I spent 22 minutes caring for Bernadette on this date of service. This time includes time spent by me in the following activities:preparing for the visit, reviewing tests, obtaining and/or reviewing a separately obtained history, performing a medically appropriate examination and/or evaluation , ordering medications, tests, or procedures, documenting information in the medical record, and independently interpreting results and communicating that  information with the patient/family/caregiver  Follow Up     Return in about 6 weeks (around 4/4/2024) for Next scheduled follow up.  Patient was given instructions and counseling regarding her condition or for health maintenance advice. Please see specific information pulled into the AVS if appropriate.

## 2024-02-21 NOTE — TELEPHONE ENCOUNTER
PT CALLED STATING SHE HAD ACTIVE SYMPTOMS. SHE ASKED FOR A SOONER APPT BECAUSE OF HER CONCERN. I INFORMED HER THAT I WOULD MESSAGE THE MA/NP AND THAT WE WILL CALL HER BACK IN A DAY OR TWO AND THAT WE WILL SEE IF WE NEED TO MOVE HER APPT PT WAS AGREEABLE AND STATED UNDERSTANDING

## 2024-02-22 ENCOUNTER — HOSPITAL ENCOUNTER (OUTPATIENT)
Dept: GENERAL RADIOLOGY | Facility: HOSPITAL | Age: 69
Discharge: HOME | End: 2024-02-22
Admitting: STUDENT IN AN ORGANIZED HEALTH CARE EDUCATION/TRAINING PROGRAM
Payer: MEDICARE

## 2024-02-22 ENCOUNTER — OFFICE VISIT (OUTPATIENT)
Dept: SURGERY | Facility: CLINIC | Age: 69
End: 2024-02-22
Payer: MEDICARE

## 2024-02-22 VITALS
SYSTOLIC BLOOD PRESSURE: 126 MMHG | BODY MASS INDEX: 26.93 KG/M2 | DIASTOLIC BLOOD PRESSURE: 64 MMHG | HEART RATE: 68 BPM | OXYGEN SATURATION: 97 % | WEIGHT: 152 LBS | HEIGHT: 63 IN

## 2024-02-22 DIAGNOSIS — C34.91 NON-SMALL CELL CANCER OF RIGHT LUNG: ICD-10-CM

## 2024-02-22 DIAGNOSIS — C34.91 NON-SMALL CELL CANCER OF RIGHT LUNG: Primary | ICD-10-CM

## 2024-02-22 PROCEDURE — 71046 X-RAY EXAM CHEST 2 VIEWS: CPT

## 2024-02-22 RX ORDER — ALBUTEROL SULFATE 90 UG/1
2 AEROSOL, METERED RESPIRATORY (INHALATION) EVERY 4 HOURS PRN
Qty: 18 G | Refills: 0 | Status: SHIPPED | OUTPATIENT
Start: 2024-02-22

## 2024-02-22 RX ORDER — GABAPENTIN 300 MG/1
300 CAPSULE ORAL EVERY 8 HOURS SCHEDULED
Qty: 90 CAPSULE | Refills: 2 | Status: SHIPPED | OUTPATIENT
Start: 2024-02-22 | End: 2024-05-22

## 2024-02-27 ENCOUNTER — PATIENT OUTREACH (OUTPATIENT)
Dept: OTHER | Facility: HOSPITAL | Age: 69
End: 2024-02-27
Payer: MEDICARE

## 2024-02-27 NOTE — PROGRESS NOTES
Reviewed chart. Status post right lower lobectomy by Dr. Epperson on 1/15/2024 for T1b N0 adenocarcinoma. Patient saw [facility]ie thoracic surgery APRN 2/22, refilled gabapentin for intercostal neuralgia . CT scan 4/17, Dr. Epperson 4/24    Called patient. Left message that I was just touching base to see how she was doing. Wanted to know if she had any questions/concerns or resource/supportive care needs; requested cb.  If we don;t connect, I will see her at her upcoming appt with Dr. Epperson in April.

## 2024-04-01 DIAGNOSIS — F41.8 DEPRESSION WITH ANXIETY: ICD-10-CM

## 2024-04-08 RX ORDER — PAROXETINE HYDROCHLORIDE 20 MG/1
20 TABLET, FILM COATED ORAL EVERY MORNING
Qty: 90 TABLET | Refills: 0 | Status: SHIPPED | OUTPATIENT
Start: 2024-04-08

## 2024-04-08 RX ORDER — HYDROXYZINE HYDROCHLORIDE 25 MG/1
25 TABLET, FILM COATED ORAL 3 TIMES DAILY PRN
Qty: 90 TABLET | Refills: 0 | Status: SHIPPED | OUTPATIENT
Start: 2024-04-08

## 2024-04-17 ENCOUNTER — HOSPITAL ENCOUNTER (OUTPATIENT)
Facility: HOSPITAL | Age: 69
Discharge: HOME | End: 2024-04-17
Admitting: STUDENT IN AN ORGANIZED HEALTH CARE EDUCATION/TRAINING PROGRAM
Payer: MEDICARE

## 2024-04-17 DIAGNOSIS — C34.91 NON-SMALL CELL CANCER OF RIGHT LUNG: ICD-10-CM

## 2024-04-17 PROCEDURE — 71250 CT THORAX DX C-: CPT

## 2024-04-24 ENCOUNTER — PATIENT OUTREACH (OUTPATIENT)
Dept: OTHER | Facility: HOSPITAL | Age: 69
End: 2024-04-24
Payer: MEDICARE

## 2024-04-24 ENCOUNTER — OFFICE VISIT (OUTPATIENT)
Dept: SURGERY | Facility: CLINIC | Age: 69
End: 2024-04-24
Payer: MEDICARE

## 2024-04-24 VITALS
DIASTOLIC BLOOD PRESSURE: 94 MMHG | OXYGEN SATURATION: 98 % | HEIGHT: 63 IN | SYSTOLIC BLOOD PRESSURE: 156 MMHG | WEIGHT: 153 LBS | BODY MASS INDEX: 27.11 KG/M2 | HEART RATE: 66 BPM

## 2024-04-24 DIAGNOSIS — C34.91 NON-SMALL CELL CANCER OF RIGHT LUNG: Primary | ICD-10-CM

## 2024-04-24 PROCEDURE — 3077F SYST BP >= 140 MM HG: CPT | Performed by: STUDENT IN AN ORGANIZED HEALTH CARE EDUCATION/TRAINING PROGRAM

## 2024-04-24 PROCEDURE — 99214 OFFICE O/P EST MOD 30 MIN: CPT | Performed by: STUDENT IN AN ORGANIZED HEALTH CARE EDUCATION/TRAINING PROGRAM

## 2024-04-24 PROCEDURE — 3080F DIAST BP >= 90 MM HG: CPT | Performed by: STUDENT IN AN ORGANIZED HEALTH CARE EDUCATION/TRAINING PROGRAM

## 2024-04-24 RX ORDER — OXYCODONE HYDROCHLORIDE 5 MG/1
TABLET ORAL
COMMUNITY

## 2024-04-24 NOTE — PROGRESS NOTES
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  Subjective        Bernadette Perez presents to [facility] THORACIC SURGERY  History of Present Illness  Ms. Perez is a pleasant 68-year-old lady who presents today for her 3-month postoperative surveillance visit.  Overall she is doing very well.  She has some mild pain in and around her incisions that radiate to the anterior chest wall.  She is complaining of difficulty wearing a bra all which is still common at this point in the recovery process.  Otherwise she denies any shortness of breath.  She denies any fevers, chills and/or cough.  She denies any hemoptysis.    Objective   Vital Signs:  /94 (BP Location: Left arm, Patient Position: Sitting, Cuff Size: Adult)   Pulse 66   Ht 158.8 cm (62.52\")   Wt 69.4 kg (153 lb)   SpO2 98%   BMI 27.52 kg/m²   Estimated body mass index is 27.52 kg/m² as calculated from the following:    Height as of this encounter: 158.8 cm (62.52\").    Weight as of this encounter: 69.4 kg (153 lb).               Physical Exam  Constitutional:       Appearance: Normal appearance.   Cardiovascular:      Rate and Rhythm: Normal rate and regular rhythm.      Pulses: Normal pulses.   Pulmonary:      Effort: Pulmonary effort is normal.      Breath sounds: Normal breath sounds.   Skin:     Comments: Right chest incisions are clean dry and intact.  Well-healed.   Neurological:      Mental Status: She is alert.        Result Review :  CT scan on 4/17/2024 was visualized and reviewed by myself.  I agree the findings.             Assessment and Plan     Diagnoses and all orders for this visit:    1. Non-small cell cancer of right lung (Primary)  -     CT Chest Without Contrast Diagnostic; Future    Ms. 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    Overall she is doing very well.  She is complaining of some mild incisional numbness that radiates to the anterior chest wall.  This is still common after cryo nerve ablation.  She denies any shortness of breath, fevers chills or cough.  She denies any overall symptoms.    Her CT scan on 4/17/2024 was visualized and reviewed by myself.  I agree with the interpretation.  No signs of recurrence noted.    I will see her back in 3 months time with a 6-month postoperative surveillance CT scan of the chest.  If her CT scan is similar at that time, we can transition to every 6 months until the 2-year josefina.           I spent 30 minutes caring for Bernadette on this date of service. This time includes time spent by me in the following activities:preparing for the visit, reviewing tests, obtaining and/or reviewing a separately obtained history, performing a medically appropriate examination and/or evaluation , counseling and educating the patient/family/caregiver, ordering medications, tests, or procedures, referring and communicating with other health care professionals , documenting information in the medical record, independently interpreting results and communicating that information with the patient/family/caregiver, and care coordination  Follow Up     No follow-ups on file.  Patient was given instructions and counseling regarding her condition or for health maintenance advice. Please see specific information pulled into the AVS if appropriate.

## 2024-04-24 NOTE — PROGRESS NOTES
Reviewed chart. S/p right robotic assisted lower lobe wedge resection with completion lobectomy and mediastinal lymph node dissection on 1/15/24. Pathology revealed pT1b pN0 invasive well to moderately differentiated mucinous adenocarcinoma.     I accompanied patient to f/u appt with Dr. Epperson today. Dr. Epperson reviewed the patient's most recent CT scan.  Dr. Epperson answered all patient questions. The patient reports intermittent numbness and pain, which may persist 6-12 months post surgery. She returned to work. The patient is breathing well; she is requesting her oxygen be picked up.  Dr. Epperson's office is facilitating this with [facility].  The patient will have a CT scan in 3 months and is scheduled to meet with Dr. Epperson on 7/24/24.      The patient denies any questions, concerns or ongoing resource needs. I will attend her 6 mth follow up appt with Dr. Epperson or call within a few days of that appt. Encouraged patient to call as needed.

## 2024-05-07 ENCOUNTER — TELEPHONE (OUTPATIENT)
Dept: SURGERY | Facility: CLINIC | Age: 69
End: 2024-05-07

## 2024-05-07 NOTE — TELEPHONE ENCOUNTER
Caller: Bernadette Perez    Relationship: Self    Best call back number: [phone]    What orders are you requesting (i.e. lab or imaging):  OF OXYGEN SUPPLIES    In what timeframe would the patient need to come in: N/A    Where will you receive your lab/imaging services: N/A    Additional notes: PLEASE PLACE AN ORDER FOR  OF OXYGEN SUPPLIES. THANK YOU

## 2024-05-08 ENCOUNTER — TELEPHONE (OUTPATIENT)
Dept: SURGERY | Facility: CLINIC | Age: 69
End: 2024-05-08
Payer: MEDICARE

## 2024-05-17 ENCOUNTER — TELEPHONE (OUTPATIENT)
Dept: SURGERY | Facility: CLINIC | Age: 69
End: 2024-05-17
Payer: MEDICARE

## 2024-05-17 NOTE — TELEPHONE ENCOUNTER
PT CALLED WANTING TO GET THE ORDER SENT TO  HER [employer], I TOLD HER WE WOULD FORWARD THE MESSAGE, BUT SINCE [employer] IS OUT IT MAY HAVE TO WAIT TILL MONDAY, PT STATED UNDERSTANDING AND WAS AGREEABLE

## 2024-05-20 RX ORDER — ATORVASTATIN CALCIUM 20 MG/1
TABLET, FILM COATED ORAL
Qty: 90 TABLET | Refills: 2 | Status: SHIPPED | OUTPATIENT
Start: 2024-05-20

## 2024-05-22 ENCOUNTER — TELEPHONE (OUTPATIENT)
Dept: SURGERY | Facility: CLINIC | Age: 69
End: 2024-05-22
Payer: MEDICARE

## 2024-05-22 NOTE — TELEPHONE ENCOUNTER
Called and left message for patient that we will need to get her scheduled for a 6min walk test to justify for insurance.

## 2024-05-22 NOTE — TELEPHONE ENCOUNTER
Provider: YOANA JEFFRIES    Caller: SONJA RADFORD    Relationship to Patient:SELF    FAX NUMBER: [phone] [facility]     Reason for Call:PT CALLING FOR [employer]  PT NEEDING OXYGEN RETURNED BUT ORDERS NEED FAXED TO [facility]. FAX NUMBER PROVIDED ABOVE- THANK YOU.    PT REQUESTING CALL BACK FROM [employer] REGARDING THIS- THANK YOU

## 2024-05-23 ENCOUNTER — OFFICE VISIT (OUTPATIENT)
Dept: SURGERY | Facility: CLINIC | Age: 69
End: 2024-05-23
Payer: MEDICARE

## 2024-05-23 VITALS
HEIGHT: 63 IN | BODY MASS INDEX: 27.52 KG/M2 | OXYGEN SATURATION: 98 % | SYSTOLIC BLOOD PRESSURE: 124 MMHG | DIASTOLIC BLOOD PRESSURE: 66 MMHG

## 2024-05-23 DIAGNOSIS — Z85.118 HISTORY OF LUNG CANCER: Primary | ICD-10-CM

## 2024-05-23 NOTE — PROGRESS NOTES
"Chief Complaint  Follow-up, walking oximetry status post lung resection    Subjective        Bernadette Perez presents to [facility] THORACIC SURGERY  History of Present Illness    Ms. Perez is a pleasant 68-year-old lady who is status post right lower lobectomy by Dr. Epperson on 1/15/2024 for T1b N0 adenocarcinoma.  She was discharged home with oxygen and presents today for walking oximetry to determine if this is needed.  She has no new complaints today.    Objective   Vital Signs:  /66 (BP Location: Left arm, Patient Position: Sitting, Cuff Size: Adult)   Ht 158.8 cm (62.52\")   SpO2 98%   BMI 27.52 kg/m²   Estimated body mass index is 27.52 kg/m² as calculated from the following:    Height as of this encounter: 158.8 cm (62.52\").    Weight as of 4/24/24: 69.4 kg (153 lb).               Physical Exam  Constitutional:       General: She is not in acute distress.     Appearance: Normal appearance. She is not ill-appearing.   HENT:      Head: Normocephalic and atraumatic.      Nose: Nose normal.   Cardiovascular:      Rate and Rhythm: Normal rate.   Pulmonary:      Effort: Pulmonary effort is normal. No respiratory distress.   Musculoskeletal:         General: Normal range of motion.      Cervical back: Normal range of motion and neck supple.   Skin:     General: Skin is warm and dry.   Neurological:      General: No focal deficit present.      Mental Status: She is alert and oriented to person, place, and time.      Cranial Nerves: No cranial nerve deficit.        Result Review :                     Assessment and Plan     Diagnoses and all orders for this visit:    1. History of lung cancer (Primary)    Walking oximetry performed in the office today.  Patient remained above 93% on room air in the office today.  We will fax order form for her to return oxygen equipment back to [facility] and plan to see her as scheduled with surveillance imaging in July 2024.       I spent 19 minutes caring " buck Flemnig on this date of service. This time includes time spent by me in the following activities:preparing for the visit, reviewing tests, performing a medically appropriate examination and/or evaluation , ordering medications, tests, or procedures, referring and communicating with other health care professionals , documenting information in the medical record, independently interpreting results and communicating that information with the patient/family/caregiver, and care coordination  Follow Up     Return in about 2 months (around 7/23/2024) for Next scheduled follow up.  Patient was given instructions and counseling regarding her condition or for health maintenance advice. Please see specific information pulled into the AVS if appropriate.          EXERCISE ON ROOM AIR SpO2% EXERCISE ON O2 @ 3 LPM SpO2%   1 MINUTE 98 1 MINUTE     2 MINUTES 94 2 MINUTES     3 MINUTES 95 3 MINUTES    4 MINUTES 96 4 MINUTES    5 MINUTES 96 5 MINUTES    6 MINUTES 96 6 MINUTES

## 2024-06-05 ENCOUNTER — OFFICE VISIT (OUTPATIENT)
Dept: INTERNAL MEDICINE | Facility: CLINIC | Age: 69
End: 2024-06-05
Payer: MEDICARE

## 2024-06-05 VITALS
SYSTOLIC BLOOD PRESSURE: 120 MMHG | BODY MASS INDEX: 26.82 KG/M2 | HEART RATE: 77 BPM | WEIGHT: 151.4 LBS | HEIGHT: 63 IN | DIASTOLIC BLOOD PRESSURE: 69 MMHG | OXYGEN SATURATION: 98 %

## 2024-06-05 DIAGNOSIS — E78.5 HYPERLIPIDEMIA, UNSPECIFIED HYPERLIPIDEMIA TYPE: ICD-10-CM

## 2024-06-05 DIAGNOSIS — F41.8 DEPRESSION WITH ANXIETY: Primary | ICD-10-CM

## 2024-06-05 DIAGNOSIS — D72.829 LEUKOCYTOSIS, UNSPECIFIED TYPE: ICD-10-CM

## 2024-06-05 DIAGNOSIS — I10 PRIMARY HYPERTENSION: ICD-10-CM

## 2024-06-05 LAB
ALBUMIN SERPL-MCNC: 4.6 G/DL (ref 3.5–5.2)
ALBUMIN/GLOB SERPL: 2 G/DL
ALP SERPL-CCNC: 95 U/L (ref 39–117)
ALT SERPL-CCNC: 16 U/L (ref 1–33)
AST SERPL-CCNC: 22 U/L (ref 1–32)
BASOPHILS # BLD AUTO: 0.08 10*3/MM3 (ref 0–0.2)
BASOPHILS NFR BLD AUTO: 1.4 % (ref 0–1.5)
BILIRUB SERPL-MCNC: 0.7 MG/DL (ref 0–1.2)
BUN SERPL-MCNC: 19 MG/DL (ref 8–23)
BUN/CREAT SERPL: 23.5 (ref 7–25)
CALCIUM SERPL-MCNC: 9.7 MG/DL (ref 8.6–10.5)
CHLORIDE SERPL-SCNC: 103 MMOL/L (ref 98–107)
CO2 SERPL-SCNC: 24.8 MMOL/L (ref 22–29)
CREAT SERPL-MCNC: 0.81 MG/DL (ref 0.57–1)
EGFRCR SERPLBLD CKD-EPI 2021: 79.7 ML/MIN/1.73
EOSINOPHIL # BLD AUTO: 0.18 10*3/MM3 (ref 0–0.4)
EOSINOPHIL NFR BLD AUTO: 3.2 % (ref 0.3–6.2)
ERYTHROCYTE [DISTWIDTH] IN BLOOD BY AUTOMATED COUNT: 14.2 % (ref 12.3–15.4)
GLOBULIN SER CALC-MCNC: 2.3 GM/DL
GLUCOSE SERPL-MCNC: 88 MG/DL (ref 65–99)
HCT VFR BLD AUTO: 39.2 % (ref 34–46.6)
HGB BLD-MCNC: 13.1 G/DL (ref 12–15.9)
IMM GRANULOCYTES # BLD AUTO: 0.04 10*3/MM3 (ref 0–0.05)
IMM GRANULOCYTES NFR BLD AUTO: 0.7 % (ref 0–0.5)
LYMPHOCYTES # BLD AUTO: 1.58 10*3/MM3 (ref 0.7–3.1)
LYMPHOCYTES NFR BLD AUTO: 27.9 % (ref 19.6–45.3)
MCH RBC QN AUTO: 28.2 PG (ref 26.6–33)
MCHC RBC AUTO-ENTMCNC: 33.4 G/DL (ref 31.5–35.7)
MCV RBC AUTO: 84.3 FL (ref 79–97)
MONOCYTES # BLD AUTO: 0.36 10*3/MM3 (ref 0.1–0.9)
MONOCYTES NFR BLD AUTO: 6.4 % (ref 5–12)
NEUTROPHILS # BLD AUTO: 3.42 10*3/MM3 (ref 1.7–7)
NEUTROPHILS NFR BLD AUTO: 60.4 % (ref 42.7–76)
NRBC BLD AUTO-RTO: 0 /100 WBC (ref 0–0.2)
PLATELET # BLD AUTO: 449 10*3/MM3 (ref 140–450)
POTASSIUM SERPL-SCNC: 4.9 MMOL/L (ref 3.5–5.2)
PROT SERPL-MCNC: 6.9 G/DL (ref 6–8.5)
RBC # BLD AUTO: 4.65 10*6/MM3 (ref 3.77–5.28)
SODIUM SERPL-SCNC: 141 MMOL/L (ref 136–145)
WBC # BLD AUTO: 5.66 10*3/MM3 (ref 3.4–10.8)

## 2024-06-05 PROCEDURE — 99214 OFFICE O/P EST MOD 30 MIN: CPT | Performed by: STUDENT IN AN ORGANIZED HEALTH CARE EDUCATION/TRAINING PROGRAM

## 2024-06-05 PROCEDURE — 3078F DIAST BP <80 MM HG: CPT | Performed by: STUDENT IN AN ORGANIZED HEALTH CARE EDUCATION/TRAINING PROGRAM

## 2024-06-05 PROCEDURE — 3074F SYST BP LT 130 MM HG: CPT | Performed by: STUDENT IN AN ORGANIZED HEALTH CARE EDUCATION/TRAINING PROGRAM

## 2024-06-05 PROCEDURE — 1126F AMNT PAIN NOTED NONE PRSNT: CPT | Performed by: STUDENT IN AN ORGANIZED HEALTH CARE EDUCATION/TRAINING PROGRAM

## 2024-06-05 RX ORDER — PAROXETINE 30 MG/1
30 TABLET, FILM COATED ORAL EVERY MORNING
Qty: 90 TABLET | Refills: 2 | Status: SHIPPED | OUTPATIENT
Start: 2024-06-05

## 2024-06-05 NOTE — PROGRESS NOTES
Dereje Alvarez M.D.  Internal Medicine  [facility]  [address], [address]  [place] [address]  [phone]      Chief Complaint  Follow-up (6 mth F/U /)    SUBJECTIVE    History of Present Illness    Bernadette Perez is a 68 y.o. female with hypertension, hyperlipidemia, GERD, history of melanoma, history of depression with anxiety  who presents to the office today as an established patient that last saw me on 11/7/2023.     At last appointment we started Paxil and PRN hydroxyzine. Feels Paxil is helping. Takes hydroxyzine as needed. Not doing counseling. Takes hydroxyzine on occasion. 1-2 drinks after work.     Hypertension-on losartan    Hyperlipidemia-on Lipitor    She underwent right lower lobectomy by Dr. Epperson on January 15 for T1b N0 adenocarcinoma. She was discharged home with oxygen. States she did a walk test and was told she did not need oxygen. Feels needles pins near incision sights.     Colonoscopy?    Was planning to do mammogram and DEXA at GYN    Review of Systems    Allergies   Allergen Reactions    Celebrex [Celecoxib] Other (See Comments)     ELEVATED LIVER ENZYMES    Codeine Nausea And Vomiting    Mobic [Meloxicam] Other (See Comments)     ELEVATED LIVER ENZYMES    Penicillins Hives    Chlorhexidine Rash    Contrast Dye (Echo Or Unknown Ct/Mr) Rash     Broke out in rash on arm    Tape Rash     PAPER TAPE IS OK.    ALLERGIC TO ADHESIVE TAPE        Outpatient Medications Marked as Taking for the 6/5/24 encounter (Office Visit) with Dereje Alvarez MD   Medication Sig Dispense Refill    atorvastatin (LIPITOR) 20 MG tablet TAKE 1 TABLET BY MOUTH EVERY DAY 90 tablet 2    Azelastine HCl 137 MCG/SPRAY solution 2 sprays into the nostril(s) as directed by provider As Needed.      fluorometholone (Flarex) 0.1 % ophthalmic suspension Administer 1 drop to the right eye Daily.      fluticasone (FLONASE) 50 MCG/ACT nasal spray 1 spray into the nostril(s) as directed by provider Daily  As Needed. Administer 2 sprays in each nostril for each dose.      hydrOXYzine (ATARAX) 25 MG tablet TAKE 1 TABLET BY MOUTH THREE TIMES A DAY AS NEEDED FOR ANXIETY 90 tablet 0    losartan (COZAAR) 50 MG tablet TAKE 1 TABLET BY MOUTH EVERY DAY 90 tablet 1    PARoxetine (PAXIL) 30 MG tablet Take 1 tablet by mouth Every Morning. 90 tablet 2    triamcinolone (KENALOG) 0.1 % cream APPLY TOPICALLY TO THE APPROPRIATE AREA TWICE A DAY AS DIRECTED 30 g 0    [DISCONTINUED] PARoxetine (PAXIL) 20 MG tablet TAKE 1 TABLET BY MOUTH EVERY DAY IN THE MORNING 90 tablet 0        Past Medical History:   Diagnosis Date    Allergic     ALLERGY INJECTIONS - ALLERGY FIRST EVERY 2 WEEKS    Anxiety     Arthritis     Colon polyp     GERD (gastroesophageal reflux disease)     H/O bone density study 2016    dr torres - [facility]    H/O complete eye exam 06/2016    History of lung biopsy     normal    Hyperlipidemia     Hypertension     Melanoma 06/15/2016    Right mid back    Pulmonary nodule     RIGHT     Past Surgical History:   Procedure Laterality Date    BRONCHOSCOPY WITH ION ROBOTIC ASSIST  12/18/2023    Procedure: BRONCHOSCOPY WITH ION ROBOT WITH BAL, BX AND FNA;  Surgeon: Robinson Epperson MD PhD;  Location: [facility] ENDOSCOPY;  Service: Robotics - Pulmonary;;  PRE/POST - lung nodule    CARPAL TUNNEL RELEASE Bilateral     COLONOSCOPY  due    polyps     ENDOSCOPY      EYE SURGERY Right     CORNEA TRANSPLANT    HEMORRHOIDECTOMY      HYSTERECTOMY      LOBECTOMY Right 1/15/2024    Procedure: COMPLETE BRONCHOSCOPY, THORACOSCOPY WITH RIGHT LOWER LOBE WEDGE AND COMPLETION RIGHT LOWER LOBECTOMY WITH LettuceThinnerI ROBOT, INTERCOSTAL NERVE BLOCK CRYO ABLATION, MEDIASTINAL LYMPH NODE DISSECTION;  Surgeon: Robinson Epperson MD PhD;  Location: [facility] MAIN OR;  Service: Robotics - DaVinci;  Laterality: Right;    SKIN CANCER EXCISION      BACK     Family History   Problem Relation Age of Onset    Diabetes Mother     COPD Mother     Mental illness Father      "Heart disease Father     Heart disease Other     Hypertension Other     Diabetes Other     Cancer Other     Thyroid disease Other     Arthritis Other     Breast cancer Cousin     Breast cancer Maternal Cousin     Robert Hyperthermia Neg Hx     reports that she has never smoked. She has never used smokeless tobacco. She reports current alcohol use. She reports that she does not use drugs.    OBJECTIVE    Vital Signs:   /69   Pulse 77   Ht 158.8 cm (62.52\")   Wt 68.7 kg (151 lb 6.4 oz)   SpO2 98%   BMI 27.23 kg/m²     Physical Exam  Constitutional:       Appearance: Normal appearance. She is normal weight.   Cardiovascular:      Rate and Rhythm: Normal rate and regular rhythm.      Heart sounds: Normal heart sounds. No murmur heard.  Pulmonary:      Effort: Pulmonary effort is normal.      Breath sounds: Normal breath sounds.   Abdominal:      General: Abdomen is flat. There is no distension.      Palpations: Abdomen is soft.      Tenderness: There is no abdominal tenderness.   Skin:     General: Skin is warm and dry.      Comments: Multiple surgical incisions   Neurological:      Mental Status: She is alert.   Psychiatric:         Mood and Affect: Mood normal.         Behavior: Behavior normal.         Thought Content: Thought content normal.            The following data was reviewed by: Dereje Alvarez MD on 06/05/2024:  CMP          1/9/2024    11:30 1/15/2024    15:49 1/16/2024    06:24   CMP   Glucose 82  144  125    BUN 13  9  10    Creatinine 0.92  0.71  0.72    EGFR 68.4  93.3  91.8    Sodium 139  140  137    Potassium 4.0  4.1  4.0    Chloride 102  104  100    Calcium 9.6  8.6  8.5    BUN/Creatinine Ratio 14.1  12.7  13.9    Anion Gap 13.1  12.6  14.8      CBC w/diff          1/9/2024    11:30 1/15/2024    15:49 1/16/2024    06:24   CBC w/Diff   WBC 4.62  11.94  15.01    RBC 4.51  4.05  3.93    Hemoglobin 12.1  11.6  11.3    Hematocrit 37.0  34.9  33.2    MCV 82.0  86.2  84.5    MCH 26.8  28.6  28.8  "   MCHC 32.7  33.2  34.0    RDW 12.9  13.5  13.0    Platelets 377  344  359    Neutrophil Rel %   87.7    Immature Granulocyte Rel %   0.5    Lymphocyte Rel %   6.7    Monocyte Rel %   4.9    Eosinophil Rel %   0.0    Basophil Rel %   0.2      Lipid Panel          11/7/2023    08:20   Lipid Panel   Total Cholesterol 214    Triglycerides 162    HDL Cholesterol 60    VLDL Cholesterol 28    LDL Cholesterol  126          Data reviewed : Recent CT surgery notes              ASSESSMENT & PLAN     Diagnoses and all orders for this visit:    1. Leukocytosis, unspecified type (Primary)  -     CBC & Differential    2. Primary hypertension  Assessment & Plan:  Hypertension is stable and controlled  Continue current treatment regimen.  Blood pressure will be reassessed in 6 months.    Orders:  -     Comprehensive Metabolic Panel    3. Depression with anxiety  -     PARoxetine (PAXIL) 30 MG tablet; Take 1 tablet by mouth Every Morning.  Dispense: 90 tablet; Refill: 2    4. Hyperlipidemia, unspecified hyperlipidemia type      Needs to schedule DEXA and Mammo. Needs to schedule colonoscopy.     Increase Paxil for anxiety/depression. Encouraged alcohol cessation today. Anxiety/depression are contributing. Discussed counseling.    She is recovering well from surgery and is off oxygen.      Health Maintenance Due   Topic Date Due    ZOSTER VACCINE (1 of 2) Never done    RSV Vaccine - Adults (1 - 1-dose 60+ series) Never done    DXA SCAN  01/01/2018    MAMMOGRAM  07/12/2018    COVID-19 Vaccine (3 - 2023-24 season) 09/01/2023    TDAP/TD VACCINES (2 - Tdap) 01/01/2024        Follow Up  Return in about 3 months (around 9/5/2024) for Recheck.    Patient/family had no further questions at this time and verbalized understanding of the plan discussed today.

## 2024-06-25 ENCOUNTER — TELEPHONE (OUTPATIENT)
Dept: SURGERY | Facility: CLINIC | Age: 69
End: 2024-06-25
Payer: MEDICARE

## 2024-06-25 NOTE — TELEPHONE ENCOUNTER
I responded to pt concerns by leaving a message that Dr Epperson was the only provider who had clinic on Wednesdays, however, I can have her scheduled with Jose Angel Perdomo who she has seen before on 7/25/2024 at 2 pm. I pt calls please schedule    DB 1:42  6/25/2024

## 2024-06-25 NOTE — TELEPHONE ENCOUNTER
Caller: Bernadette Perez    Relationship to patient: Self    Best call back number: [phone]     Chief complaint: PT HAS APPT WITH [facility]-NEEDS APPT SAME DAY WITH ANOTHER PROVIDER    Type of visit: F/U    Requested date: 7/24/24     If rescheduling, when is the original appointment: 7/24/24     Additional notes:PT WILLIE WITH [facility] STATES SHE CAN SEE ANY PROVIDER THAT CAN SEE HER SAME DAY DUE TO ALREADY BEING OFF WORK     PLEASE LEAVE MESSAGE IF NO ANSWER- PT IS AT WORK 8-4 MONDAY THROUGH FRIDAY

## 2024-06-25 NOTE — TELEPHONE ENCOUNTER
I responded to pt concerns by leaving a message that Dr Jeffries was the only provider who had clinic on Wednesdays, however, I can have her scheduled with Jose Angel Perdomo who she has seen before on 7/25/2024 at 2 pm. I pt calls please schedule    DB 1:41  6/25/2024    Caller: Bernadette Perez    Relationship to patient: Self    Best call back number: [phone]     Chief complaint: PT HAS APPT WITH [facility]-NEEDS APPT SAME DAY WITH ANOTHER PROVIDER    Type of visit: F/U    Requested date: 7/24/24     If rescheduling, when is the original appointment: 7/24/24     Additional notes:PT WILLIE WITH [facility] STATES SHE CAN SEE ANY PROVIDER THAT CAN SEE HER SAME DAY DUE TO ALREADY BEING OFF WORK     PLEASE LEAVE MESSAGE IF NO ANSWER- PT IS AT WORK 8-4 MONDAY THROUGH FRIDAY

## 2024-06-27 ENCOUNTER — OFFICE VISIT (OUTPATIENT)
Dept: SURGERY | Facility: CLINIC | Age: 69
End: 2024-06-27
Payer: MEDICARE

## 2024-06-27 VITALS
BODY MASS INDEX: 26.61 KG/M2 | HEIGHT: 63 IN | WEIGHT: 150.2 LBS | DIASTOLIC BLOOD PRESSURE: 68 MMHG | SYSTOLIC BLOOD PRESSURE: 125 MMHG

## 2024-06-27 DIAGNOSIS — K62.5 RECTAL BLEEDING: Primary | ICD-10-CM

## 2024-06-27 PROCEDURE — 1160F RVW MEDS BY RX/DR IN RCRD: CPT | Performed by: SURGERY

## 2024-06-27 PROCEDURE — 3078F DIAST BP <80 MM HG: CPT | Performed by: SURGERY

## 2024-06-27 PROCEDURE — 99203 OFFICE O/P NEW LOW 30 MIN: CPT | Performed by: SURGERY

## 2024-06-27 PROCEDURE — 3074F SYST BP LT 130 MM HG: CPT | Performed by: SURGERY

## 2024-06-27 PROCEDURE — 1159F MED LIST DOCD IN RCRD: CPT | Performed by: SURGERY

## 2024-07-08 DIAGNOSIS — I10 PRIMARY HYPERTENSION: ICD-10-CM

## 2024-07-08 RX ORDER — LOSARTAN POTASSIUM 50 MG/1
TABLET ORAL
Qty: 90 TABLET | Refills: 1 | Status: SHIPPED | OUTPATIENT
Start: 2024-07-08

## 2024-07-17 NOTE — PROGRESS NOTES
General Surgery  Initial Office Visit    CC: Rectal bleeding    HPI: The patient is a pleasant 68 y.o. year-old lady who presents today for evaluation of intermittent rectal bleeding that has been ongoing every few months for the last many years.  It seems to occur sporadically, although she has noticed a correlation with hematochezia occurring after she has eaten popcorn.  The hematochezia is painless with no anal or rectal pain noted during these episodes.  She will notice bright red blood mixed in with the water and on the toilet paper but has not noticed any blood within the stool or any melena.  She had a fairly severe episode about 2 weeks ago that lasted for a few days before subsiding.  She has a history of internal hemorrhoids and underwent a hemorrhoidectomy in 2002 by Dr. Carpenter.  She is unsure if she has ever had a colonoscopy, although her chart lists a colonoscopy performed in 2012.  She does not recall ever performing a bowel prep, even with her hemorrhoidectomy.    Past Medical History:   Hypertension  Hyperlipidemia  GERD  History of melanoma  Depression with anxiety    Past Surgical History:   Robotic thoracoscopy with right lower lobectomy and mediastinal lymph node dissection (January 2024, Dr. Epperson)  Bronchoscopy with biopsy (December 2023, Dr. Epperson)  Right cornea transplant  Hemorrhoidectomy  Hysterectomy    Medications:   Atorvastatin 20 mg daily  Azelastine nasal spray as needed  Fluorometholone eyedrops to right eye daily  Fluticasone nasal spray as needed  Hydroxyzine 25 mg 3 times daily as needed for anxiety  Paroxetine 30 mg daily  Losartan 50 mg daily  Triamcinolone 0.1% cream twice daily    Allergies: Celebrex, codeine, meloxicam, penicillin, chlorhexidine, contrast dye, adhesive tape    Family History: No family history of gastrointestinal malignancy, 2 of her cousins had breast cancer    Social History: , non-smoker, social alcohol use, works at [employer]    ROS:    Constitutional: Negative for fevers or chills  HENT: Negative for hearing loss or runny nose  Eyes: Negative for vision changes or scleral icterus  Respiratory: Negative for cough or shortness of breath  Cardiovascular: Negative for chest pain or heart palpitations  Gastrointestinal: Positive for hematochezia; negative for abdominal distension, nausea, vomiting, constipation, melena, or hematochezia  Genitourinary: Negative for hematuria or dysuria  Musculoskeletal: Negative for joint swelling or gait instability  Neurologic: Negative for tremors or seizures  Psychiatric: Negative for suicidal ideations or agitation  All other systems reviewed and negative    Physical Exam:  Height: 158 cm  Weight: 68 kg  BMI: 27  General: No acute distress, well-nourished & well-developed  HEAD: normocephalic, atraumatic  EYES: normal conjunctiva, sclera anicteric  EARS: grossly normal hearing  NECK: supple, no thyromegaly  CARDIOVASCULAR: regular rate and rhythm  RESPIRATORY: clear to auscultation bilaterally  GASTROINTESTINAL: soft, nontender, non-distended  MUSCULOSKELETAL: normal gait and station. No gross extremity abnormalities  PSYCHIATRIC: oriented x3, normal mood and affect      ASSESSMENT & PLAN  Mrs. Perez is a 68-year-old lady with intermittent hematochezia, possibly hemorrhoidal in nature.  I would recommend scheduling her for colonoscopy with possible hemorrhoid banding at her convenience.  I discussed with her the risks of the colonoscopy which include bleeding, colon perforation, and possible missed pathology.  Despite these risks, she has consented to proceed.    Cass Amin MD  General, Robotic, and Endoscopic Surgery  [facility]    [address], [address]  [place] [address]  P: [phone]  F: [phone]

## 2024-07-22 ENCOUNTER — HOSPITAL ENCOUNTER (OUTPATIENT)
Facility: HOSPITAL | Age: 69
Discharge: HOME | End: 2024-07-22
Admitting: STUDENT IN AN ORGANIZED HEALTH CARE EDUCATION/TRAINING PROGRAM
Payer: MEDICARE

## 2024-07-22 DIAGNOSIS — C34.91 NON-SMALL CELL CANCER OF RIGHT LUNG: ICD-10-CM

## 2024-07-22 PROCEDURE — 71250 CT THORAX DX C-: CPT

## 2024-07-25 ENCOUNTER — OFFICE VISIT (OUTPATIENT)
Dept: SURGERY | Facility: CLINIC | Age: 69
End: 2024-07-25
Payer: MEDICARE

## 2024-07-25 VITALS
SYSTOLIC BLOOD PRESSURE: 140 MMHG | WEIGHT: 155.4 LBS | BODY MASS INDEX: 27.97 KG/M2 | DIASTOLIC BLOOD PRESSURE: 80 MMHG | OXYGEN SATURATION: 97 % | HEART RATE: 67 BPM

## 2024-07-25 DIAGNOSIS — Z85.118 HISTORY OF LUNG CANCER: Primary | ICD-10-CM

## 2024-07-25 NOTE — PROGRESS NOTES
"Chief Complaint  Follow-up, status post RLL resection January 2024    Subjective        Bernadette Perez presents to [facility] THORACIC SURGERY  Follow-up      Ms. Perez is a very pleasant 68-year-old lady who is status post robot-assisted right lower lobectomy by Dr. Epperson January 2024 for a T1b N0 adenocarcinoma.  She was on oxygen postoperatively but is now on room air.  She has slowly returned to her baseline.  She is a never smoker.  She denies new onset shortness of breath or associated pulmonary symptoms.  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  She presents today feeling well with CT chest.  She has an upcoming colonoscopy next week.      Objective   Vital Signs:  /80 (BP Location: Right arm, Patient Position: Sitting, Cuff Size: Adult)   Pulse 67   Wt 70.5 kg (155 lb 6.4 oz)   SpO2 97%   BMI 27.97 kg/m²   Estimated body mass index is 27.97 kg/m² as calculated from the following:    Height as of 6/27/24: 158.8 cm (62.5\").    Weight as of this encounter: 70.5 kg (155 lb 6.4 oz).               Physical Exam  Constitutional:       General: She is not in acute distress.     Appearance: Normal appearance. She is not ill-appearing.   HENT:      Head: Normocephalic and atraumatic.      Mouth/Throat:      Mouth: Mucous membranes are moist.   Cardiovascular:      Rate and Rhythm: Normal rate.   Pulmonary:      Effort: Pulmonary effort is normal. No respiratory distress.   Musculoskeletal:         General: Normal range of motion.      Cervical back: Normal range of motion and neck supple.   Skin:     General: Skin is warm and dry.   Neurological:      General: No focal deficit present.      Mental Status: She is alert.      Motor: No weakness.   Psychiatric:         Mood and Affect: Mood normal.         Thought Content: Thought content normal.        Result Review :            CT chest 7/22/2024: Postsurgical changes from right lower lobectomy.  Stable tiny groundglass nodule in " the right upper lobe.  Stable tiny groundglass nodule in the left lower lobe.  Scattered granulomatous calcifications as before.  No mediastinal or hilar lymphadenopathy.  No pleural pericardial effusion.  Imaging was reviewed independently.         Assessment and Plan     Diagnoses and all orders for this visit:    1. History of lung cancer (Primary)  -     CT Chest Without Contrast; Future    Ms. Perez is status post right lower lobectomy in January 2024 for stage I non-small cell lung cancer.  Her most recent CT chest is stable without evidence of recurrent or metastatic disease.  Recommend we continue her surveillance with a CT chest in 6 months.  Current imaging was reviewed with her today and she is in agreement with this plan.       I spent 31 minutes caring for Bernadette on this date of service. This time includes time spent by me in the following activities:preparing for the visit, reviewing tests, obtaining and/or reviewing a separately obtained history, performing a medically appropriate examination and/or evaluation , counseling and educating the patient/family/caregiver, ordering medications, tests, or procedures, documenting information in the medical record, independently interpreting results and communicating that information with the patient/family/caregiver, and care coordination  Follow Up     Return in about 6 months (around 1/25/2025) for Next scheduled follow up.  Patient was given instructions and counseling regarding her condition or for health maintenance advice. Please see specific information pulled into the AVS if appropriate.

## 2024-07-26 ENCOUNTER — PATIENT OUTREACH (OUTPATIENT)
Dept: OTHER | Facility: HOSPITAL | Age: 69
End: 2024-07-26
Payer: MEDICARE

## 2024-07-26 NOTE — PROGRESS NOTES
Reviewed chart. S/p right robotic assisted lower lobe wedge resection with completion lobectomy and mediastinal lymph node dissection on 1/15/24. Pathology revealed pT1b pN0 invasive well to moderately differentiated mucinous adenocarcinoma. The patient's most recent CT chest is stable without evidence of recurrent or metastatic disease. Met with Minnie thoracic surgery APRTIMOTHY yesterday. Plan CT 6 mths.     Called patient. Left message that I was just touching base to see how she was doing.  Since she is stable 6 mths post surgery with no cancer recurrence,I will close her case to navigation although reminded her that she can access the services in the [facility] even with her navigation case being closed. Will mail survivorship packet. Encouraged patient to call in the future if the need arises.

## 2024-07-29 ENCOUNTER — HOSPITAL ENCOUNTER (OUTPATIENT)
Facility: HOSPITAL | Age: 69
Setting detail: HOSPITAL OUTPATIENT SURGERY
Discharge: HOME | End: 2024-07-29
Attending: SURGERY | Admitting: SURGERY
Payer: MEDICARE

## 2024-07-29 ENCOUNTER — ANESTHESIA EVENT (OUTPATIENT)
Dept: GASTROENTEROLOGY | Facility: HOSPITAL | Age: 69
End: 2024-07-29
Payer: MEDICARE

## 2024-07-29 ENCOUNTER — ANESTHESIA (OUTPATIENT)
Dept: GASTROENTEROLOGY | Facility: HOSPITAL | Age: 69
End: 2024-07-29
Payer: MEDICARE

## 2024-07-29 VITALS
SYSTOLIC BLOOD PRESSURE: 139 MMHG | RESPIRATION RATE: 15 BRPM | OXYGEN SATURATION: 97 % | WEIGHT: 150.3 LBS | DIASTOLIC BLOOD PRESSURE: 73 MMHG | BODY MASS INDEX: 27.05 KG/M2 | HEART RATE: 76 BPM

## 2024-07-29 DIAGNOSIS — K64.8 INTERNAL HEMORRHOIDS: ICD-10-CM

## 2024-07-29 DIAGNOSIS — K62.5 RECTAL BLEEDING: ICD-10-CM

## 2024-07-29 DIAGNOSIS — K63.5 POLYP OF COLON, UNSPECIFIED PART OF COLON, UNSPECIFIED TYPE: Primary | ICD-10-CM

## 2024-07-29 PROCEDURE — 88305 TISSUE EXAM BY PATHOLOGIST: CPT | Performed by: SURGERY

## 2024-07-29 PROCEDURE — S0260 H&P FOR SURGERY: HCPCS | Performed by: SURGERY

## 2024-07-29 PROCEDURE — 45381 COLONOSCOPY SUBMUCOUS NJX: CPT | Performed by: SURGERY

## 2024-07-29 PROCEDURE — 25810000003 LACTATED RINGERS PER 1000 ML: Performed by: SURGERY

## 2024-07-29 PROCEDURE — 45385 COLONOSCOPY W/LESION REMOVAL: CPT | Performed by: SURGERY

## 2024-07-29 PROCEDURE — 45398 COLONOSCOPY W/BAND LIGATION: CPT | Performed by: SURGERY

## 2024-07-29 PROCEDURE — 25010000002 GLYCOPYRROLATE 0.2 MG/ML SOLUTION: Performed by: NURSE ANESTHETIST, CERTIFIED REGISTERED

## 2024-07-29 PROCEDURE — 25010000002 PROPOFOL 1000 MG/100ML EMULSION: Performed by: NURSE ANESTHETIST, CERTIFIED REGISTERED

## 2024-07-29 PROCEDURE — 25010000002 PROPOFOL 200 MG/20ML EMULSION: Performed by: NURSE ANESTHETIST, CERTIFIED REGISTERED

## 2024-07-29 RX ORDER — OXYCODONE HYDROCHLORIDE AND ACETAMINOPHEN 5; 325 MG/1; MG/1
1 TABLET ORAL EVERY 4 HOURS PRN
Qty: 15 TABLET | Refills: 0 | Status: SHIPPED | OUTPATIENT
Start: 2024-07-29

## 2024-07-29 RX ORDER — BUPIVACAINE HYDROCHLORIDE AND EPINEPHRINE 2.5; 5 MG/ML; UG/ML
INJECTION, SOLUTION EPIDURAL; INFILTRATION; INTRACAUDAL; PERINEURAL AS NEEDED
Status: DISCONTINUED | OUTPATIENT
Start: 2024-07-29 | End: 2024-07-29 | Stop reason: HOSPADM

## 2024-07-29 RX ORDER — PROPOFOL 10 MG/ML
INJECTION, EMULSION INTRAVENOUS AS NEEDED
Status: DISCONTINUED | OUTPATIENT
Start: 2024-07-29 | End: 2024-07-29 | Stop reason: SURG

## 2024-07-29 RX ORDER — GLYCOPYRROLATE 0.2 MG/ML
INJECTION INTRAMUSCULAR; INTRAVENOUS AS NEEDED
Status: DISCONTINUED | OUTPATIENT
Start: 2024-07-29 | End: 2024-07-29 | Stop reason: SURG

## 2024-07-29 RX ORDER — LIDOCAINE HYDROCHLORIDE 20 MG/ML
INJECTION, SOLUTION INFILTRATION; PERINEURAL AS NEEDED
Status: DISCONTINUED | OUTPATIENT
Start: 2024-07-29 | End: 2024-07-29 | Stop reason: SURG

## 2024-07-29 RX ORDER — SODIUM CHLORIDE, SODIUM LACTATE, POTASSIUM CHLORIDE, CALCIUM CHLORIDE 600; 310; 30; 20 MG/100ML; MG/100ML; MG/100ML; MG/100ML
1000 INJECTION, SOLUTION INTRAVENOUS CONTINUOUS
Status: DISCONTINUED | OUTPATIENT
Start: 2024-07-29 | End: 2024-07-29 | Stop reason: HOSPADM

## 2024-07-29 RX ORDER — PROPOFOL 10 MG/ML
INJECTION, EMULSION INTRAVENOUS CONTINUOUS PRN
Status: DISCONTINUED | OUTPATIENT
Start: 2024-07-29 | End: 2024-07-29 | Stop reason: SURG

## 2024-07-29 RX ADMIN — LIDOCAINE HYDROCHLORIDE 60 MG: 20 INJECTION, SOLUTION INFILTRATION; PERINEURAL at 13:33

## 2024-07-29 RX ADMIN — PROPOFOL INJECTABLE EMULSION 50 MG: 10 INJECTION, EMULSION INTRAVENOUS at 13:33

## 2024-07-29 RX ADMIN — SODIUM CHLORIDE, POTASSIUM CHLORIDE, SODIUM LACTATE AND CALCIUM CHLORIDE 1000 ML: 600; 310; 30; 20 INJECTION, SOLUTION INTRAVENOUS at 13:19

## 2024-07-29 RX ADMIN — GLYCOPYRROLATE 0.2 MG: 0.2 INJECTION INTRAMUSCULAR; INTRAVENOUS at 13:41

## 2024-07-29 RX ADMIN — PROPOFOL 200 MCG/KG/MIN: 10 INJECTION, EMULSION INTRAVENOUS at 13:34

## 2024-07-29 NOTE — ANESTHESIA POSTPROCEDURE EVALUATION
Patient: Bernadette Perez    Procedure Summary       Date: 07/29/24 Room / Location: [facility] 10 / [facility]    Anesthesia Start: 1331 Anesthesia Stop: 1406    Procedure: Colonoscopy to cecum with internal hemorrhoid bandingx2, cold biopsy and hot snare polypectomies, and tattoo injection Diagnosis:       Rectal bleeding      (Rectal bleeding [K62.5])    Surgeons: Cass Amin MD Provider:     Anesthesia Type: MAC ASA Status: 2            Anesthesia Type: MAC    Vitals  Vitals Value Taken Time   /66 07/29/24 1416   Temp     Pulse 74 07/29/24 1422   Resp 23 07/29/24 1415   SpO2 98 % 07/29/24 1422   Vitals shown include unfiled device data.        Post Anesthesia Care and Evaluation    Patient location during evaluation: PACU  Patient participation: complete - patient participated  Level of consciousness: awake and alert  Pain management: adequate    Airway patency: patent  Anesthetic complications: No anesthetic complications    Cardiovascular status: acceptable  Respiratory status: acceptable  Hydration status: acceptable    Comments: --------------------            07/29/24               1415     --------------------   BP:       131/66     Pulse:      80       Resp:       23       SpO2:      96%      --------------------

## 2024-07-29 NOTE — H&P
General Surgery  History & Physical    CC: Rectal bleeding    HPI: The patient is a pleasant 68 y.o. year-old lady who presents today for evaluation of intermittent rectal bleeding that has been ongoing every few months for the last many years.  It seems to occur sporadically, although she has noticed a correlation with hematochezia occurring after she has eaten popcorn.  The hematochezia is painless with no anal or rectal pain noted during these episodes.  She will notice bright red blood mixed in with the water and on the toilet paper but has not noticed any blood within the stool or any melena.  She had a fairly severe episode about 6 weeks ago that lasted for a few days before subsiding.  She has a history of internal hemorrhoids and underwent a hemorrhoidectomy in 2002 by Dr. Carpenter.  She is unsure if she has ever had a colonoscopy, although her chart lists a colonoscopy performed in 2012.  She does not recall ever performing a bowel prep, even with her hemorrhoidectomy.    Past Medical History:   Hypertension  Hyperlipidemia  GERD  History of melanoma  Depression with anxiety    Past Surgical History:   Robotic thoracoscopy with right lower lobectomy and mediastinal lymph node dissection (January 2024, Dr. Epperson)  Bronchoscopy with biopsy (December 2023, Dr. Epperson)  Right cornea transplant  Hemorrhoidectomy  Hysterectomy    Medications:   Atorvastatin 20 mg daily  Azelastine nasal spray as needed  Fluorometholone eyedrops to right eye daily  Fluticasone nasal spray as needed  Hydroxyzine 25 mg 3 times daily as needed for anxiety  Paroxetine 30 mg daily  Losartan 50 mg daily  Triamcinolone 0.1% cream twice daily    Allergies: Celebrex, codeine, meloxicam, penicillin, chlorhexidine, contrast dye, adhesive tape    Family History: No family history of gastrointestinal malignancy, 2 of her cousins had breast cancer    Social History: , non-smoker, social alcohol use, works at [employer]    ROS:   Constitutional:  Negative for fevers or chills  HENT: Negative for hearing loss or runny nose  Eyes: Negative for vision changes or scleral icterus  Respiratory: Negative for cough or shortness of breath  Cardiovascular: Negative for chest pain or heart palpitations  Gastrointestinal: Positive for hematochezia; negative for abdominal distension, nausea, vomiting, constipation, melena, or hematochezia  Genitourinary: Negative for hematuria or dysuria  Musculoskeletal: Negative for joint swelling or gait instability  Neurologic: Negative for tremors or seizures  Psychiatric: Negative for suicidal ideations or agitation  All other systems reviewed and negative    Physical Exam:  Vitals:    07/29/24 1310   BP: 127/79   Pulse: 71   Resp: 16   SpO2: 97%   Height: 158 cm  Weight: 68 kg  BMI: 27  General: No acute distress, well-nourished & well-developed  HEAD: normocephalic, atraumatic  EYES: normal conjunctiva, sclera anicteric  EARS: grossly normal hearing  NECK: supple, no thyromegaly  CARDIOVASCULAR: regular rate and rhythm  RESPIRATORY: clear to auscultation bilaterally  GASTROINTESTINAL: soft, nontender, non-distended  MUSCULOSKELETAL: normal gait and station. No gross extremity abnormalities  PSYCHIATRIC: oriented x3, normal mood and affect    ASSESSMENT & PLAN  Mrs. Perez is a 68-year-old lady with intermittent hematochezia, possibly hemorrhoidal in nature.  I would recommend performing a colonoscopy with possible internal hemorrhoid banding today.  I discussed with her the risks of the colonoscopy which include bleeding, colon perforation, and possible missed pathology.  Despite these risks, she has consented to proceed.    Cass Amin MD  General, Robotic, and Endoscopic Surgery  [facility]    [address], [address]  [place] [address]  P: [phone]  F: [phone]

## 2024-07-29 NOTE — OP NOTE
Operative Note :  Cass Amin MD      Bernadette Eastonon  1955    Procedure Date: 07/29/24    Pre-op Diagnosis:  Rectal bleeding [K62.5]    Post-Operative Diagnosis:  Internal hemorrhoids  External hemorrhoidal skin tags  Colon polyps    Procedure:   Flexible colonoscopy to the cecum with hot snare polypectomy x3, Iza ink tattoo injection, and internal hemorrhoid endoscopic banding x 2    Surgeon: Cass Amin MD    Assistant: None    Anesthesia:  MAC (monitored anesthetic care)    Estimated Blood Loss: Minimal    Specimens:   Transverse colon polyp  Rectal polyps x 2    Complications: None    Indications:  The patient is a 68-year-old lady who came to see me in the office in late June with intermittent hematochezia of a few years duration.  I have recommended proceeding with a colonoscopy and possible internal hemorrhoid banding today.  She has been counseled on the risks of the procedure to include bleeding, colon perforation, and possible missed pathology.  Additionally, if internal hemorrhoid banding is performed she is at risk for developing pelvic sepsis.  Despite these risks, she has consented to proceed.    Findings: 3 colon and rectal polyps removed via the hot snare    Description of procedure:  The patient was brought to the endoscopy suite and jossy in the left lateral decubitus position.  Continuous propofol anesthesia was administered.  A surgical timeout was completed.  A digital rectal exam was performed, revealing a few engorged external anal hemorrhoidal skin tags but no evidence for a thrombosed external hemorrhoid.  An adult colonoscope was then inserted through the anus and passed under direct visualization to the level of the cecum.  The cecum was identified via the ileocecal valve as well as the appendiceal orifice.  The scope was then slowly withdrawn, examining all circumferential walls of the ascending, transverse, descending, and sigmoid colon.  There was a 6 mm polyp of  the transverse colon removed using the hot snare.  The base of the polypectomy site was also removed using the biopsy forceps.  This polypectomy site was injected with 4 cc of Iza ink submucosal tattoo.  Further along in the rectum there were 2 separate 5 mm polyps removed using the hot snare at each location.  All 3 of the colon and rectal polyps were retrieved and sent to pathology.  Within the rectum the scope was retroflexed and showed nonbleeding grade 2 internal hemorrhoids.  The colonoscope was removed and the perianal skin was prepped using an alcohol swab.  The skin and soft tissues were anesthetized circumferentially using 0.25% Marcaine with epinephrine.  I then inserted the EGD scope with the internal hemorrhoid banding apparatus affixed to its tip.  The scope was retroflexed and 2 separate internal hemorrhoid columns were banded above the level of the dentate line.  The remainder of the anal tissues were inspected and appeared to be in good order, with no further engorged internal hemorrhoid columns appreciable.  The scope was then withdrawn and the colon desufflated.  The patient had a very good bowel prep and was transferred to the recovery area in stable condition.     Recommendations:  I will call the patient in 1 week or less with the pathology results of the 3 polyps removed as this will determine when the next screening colonoscopy will be due.    Cass Amin MD  General, Robotic, and Endoscopic Surgery  [facility]    [address], [address]  [place] [id number]  P: [phone]  F: [phone]

## 2024-07-29 NOTE — ANESTHESIA PREPROCEDURE EVALUATION
Anesthesia Evaluation     Patient summary reviewed and Nursing notes reviewed                Airway   Mallampati: II  TM distance: >3 FB  Neck ROM: full  No difficulty expected  Dental      Pulmonary    (+) lung cancer (Non small cell s/p wedge lung resection),  Cardiovascular     ECG reviewed  Rhythm: regular  Rate: normal    (+) hypertension, hyperlipidemia      Neuro/Psych  (+) psychiatric history Anxiety and Depression  GI/Hepatic/Renal/Endo    (+) GERD, GI bleeding     Musculoskeletal     Abdominal    Substance History   (+) alcohol use     OB/GYN negative ob/gyn ROS         Other   arthritis,   history of cancer (skin cancer removal history)                  Anesthesia Plan    ASA 2     MAC     intravenous induction     Anesthetic plan, risks, benefits, and alternatives have been provided, discussed and informed consent has been obtained with: patient.    CODE STATUS:

## 2024-07-30 ENCOUNTER — OFFICE VISIT (OUTPATIENT)
Dept: INTERNAL MEDICINE | Facility: CLINIC | Age: 69
End: 2024-07-30
Payer: MEDICARE

## 2024-07-30 VITALS
SYSTOLIC BLOOD PRESSURE: 126 MMHG | WEIGHT: 150 LBS | OXYGEN SATURATION: 98 % | DIASTOLIC BLOOD PRESSURE: 82 MMHG | HEART RATE: 61 BPM | BODY MASS INDEX: 26.58 KG/M2 | TEMPERATURE: 98.6 F | HEIGHT: 63 IN

## 2024-07-30 DIAGNOSIS — K21.9 GASTROESOPHAGEAL REFLUX DISEASE, UNSPECIFIED WHETHER ESOPHAGITIS PRESENT: ICD-10-CM

## 2024-07-30 DIAGNOSIS — J02.9 SORE THROAT: Primary | ICD-10-CM

## 2024-07-30 LAB
LAB AP CASE REPORT: NORMAL
PATH REPORT.FINAL DX SPEC: NORMAL
PATH REPORT.GROSS SPEC: NORMAL

## 2024-07-30 PROCEDURE — 3079F DIAST BP 80-89 MM HG: CPT | Performed by: INTERNAL MEDICINE

## 2024-07-30 PROCEDURE — 99214 OFFICE O/P EST MOD 30 MIN: CPT | Performed by: INTERNAL MEDICINE

## 2024-07-30 PROCEDURE — 3074F SYST BP LT 130 MM HG: CPT | Performed by: INTERNAL MEDICINE

## 2024-07-30 PROCEDURE — 1126F AMNT PAIN NOTED NONE PRSNT: CPT | Performed by: INTERNAL MEDICINE

## 2024-07-30 PROCEDURE — 1160F RVW MEDS BY RX/DR IN RCRD: CPT | Performed by: INTERNAL MEDICINE

## 2024-07-30 PROCEDURE — 1159F MED LIST DOCD IN RCRD: CPT | Performed by: INTERNAL MEDICINE

## 2024-07-30 RX ORDER — OMEPRAZOLE 40 MG/1
40 CAPSULE, DELAYED RELEASE ORAL DAILY
Qty: 30 CAPSULE | Refills: 1 | Status: SHIPPED | OUTPATIENT
Start: 2024-07-30

## 2024-07-30 NOTE — PROGRESS NOTES
Subjective     Bernadette Perez is a 68 y.o. female who presents with   Chief Complaint   Patient presents with    Sore Throat     Started on Friday 7/26/2024       Sore Throat   Pertinent negatives include no congestion or ear pain.        Five days of sore throat.  No fever.  No ear pain.  No congestion.  No cough.  GERD is poorly controlled.      Review of Systems   Constitutional:  Negative for fever.   HENT:  Positive for sore throat. Negative for congestion, ear pain and sinus pain.    Respiratory: Negative.     Cardiovascular: Negative.        The following portions of the patient's history were reviewed and updated as appropriate: allergies, current medications and problem list.    Patient Active Problem List    Diagnosis Date Noted    Internal hemorrhoids 07/29/2024    Colon polyps 07/29/2024    Rectal bleeding 06/27/2024    Non-small cell lung cancer 01/15/2024    Solitary pulmonary nodule 12/14/2023    Primary hypertension 11/15/2022    Depression with anxiety 05/08/2018    Gastroesophageal reflux disease 07/17/2017    Hyperlipidemia 09/08/2016    Elevated liver enzymes 09/08/2016    Arthritis 07/12/2016    Allergic rhinitis 07/12/2016    Melanoma 06/15/2016     Note Last Updated: 3/2/2023     Right mid back         Current Outpatient Medications on File Prior to Visit   Medication Sig Dispense Refill    atorvastatin (LIPITOR) 20 MG tablet TAKE 1 TABLET BY MOUTH EVERY DAY 90 tablet 2    Azelastine HCl 137 MCG/SPRAY solution 2 sprays into the nostril(s) as directed by provider As Needed.      fluorometholone (Flarex) 0.1 % ophthalmic suspension Administer 1 drop to the right eye Daily.      fluticasone (FLONASE) 50 MCG/ACT nasal spray 1 spray into the nostril(s) as directed by provider Daily As Needed. Administer 2 sprays in each nostril for each dose.      hydrOXYzine (ATARAX) 25 MG tablet TAKE 1 TABLET BY MOUTH THREE TIMES A DAY AS NEEDED FOR ANXIETY 90 tablet 0    losartan (COZAAR) 50 MG tablet TAKE 1  "TABLET BY MOUTH EVERY DAY 90 tablet 1    oxyCODONE-acetaminophen (Percocet) 5-325 MG per tablet Take 1 tablet by mouth Every 4 (Four) Hours As Needed for Moderate Pain. 15 tablet 0    PARoxetine (PAXIL) 30 MG tablet Take 1 tablet by mouth Every Morning. 90 tablet 2    triamcinolone (KENALOG) 0.1 % cream APPLY TOPICALLY TO THE APPROPRIATE AREA TWICE A DAY AS DIRECTED 30 g 0     Current Facility-Administered Medications on File Prior to Visit   Medication Dose Route Frequency Provider Last Rate Last Admin    [DISCONTINUED] bupivacaine-EPINEPHrine PF (MARCAINE w/EPI) 0.25% -1:200000 injection    PRN Cass Amin MD   30 mL at 07/29/24 1359    [DISCONTINUED] glycopyrrolate (ROBINUL) injection   Intravenous PRN Alana Hart CRNA   0.2 mg at 07/29/24 1341    [DISCONTINUED] lactated ringers infusion 1,000 mL  1,000 mL Intravenous Continuous Cass Amin MD 25 mL/hr at 07/29/24 1319 Restarted at 07/29/24 1331    [DISCONTINUED] lidocaine (XYLOCAINE) 2% injection   Intravenous PRN Alana Hart CRNA   60 mg at 07/29/24 1333    [DISCONTINUED] Propofol (DIPRIVAN) injection   Intravenous Continuous PRN Alana Hart CRNA   Stopped at 07/29/24 1400    [DISCONTINUED] Propofol (DIPRIVAN) injection   Intravenous PRN Alana Hart CRNA   50 mg at 07/29/24 1333       Objective     /82   Pulse 61   Temp 98.6 °F (37 °C) (Oral)   Ht 158.8 cm (62.5\")   Wt 68 kg (150 lb)   SpO2 98%   BMI 27.00 kg/m²     Physical Exam  Constitutional:       Appearance: She is well-developed.   HENT:      Head: Normocephalic and atraumatic.      Right Ear: Hearing and tympanic membrane normal.      Left Ear: Hearing and tympanic membrane normal.      Mouth/Throat:      Pharynx: No oropharyngeal exudate or posterior oropharyngeal erythema.   Cardiovascular:      Rate and Rhythm: Normal rate and regular rhythm.      Heart sounds: Normal heart sounds.   Pulmonary:      Effort: Pulmonary effort is " normal.      Breath sounds: Normal breath sounds.   Skin:     General: Skin is warm and dry.   Neurological:      Mental Status: She is alert and oriented to person, place, and time.   Psychiatric:         Behavior: Behavior normal.         Assessment & Plan   Diagnoses and all orders for this visit:    1. Sore throat (Primary)  -     Cancel: POC Rapid Strep A    2. Gastroesophageal reflux disease, unspecified whether esophagitis present    Other orders  -     omeprazole (priLOSEC) 40 MG capsule; Take 1 capsule by mouth Daily.  Dispense: 30 capsule; Refill: 1        Discussion    Patient presents with persistent sore throat possibly secondary to poorly controlled GERD.  Rx for PPI for next couple months.  GERD handout.  The patient is instructed to let our office know if not feeling better over the next week or if there is any change in symptoms.           Future Appointments   Date Time Provider Department Center   9/6/2024  7:30 AM Dereje Alvarez MD [facility]   11/12/2024  7:30 AM Dereje Alvarez MD [facility]   1/30/2025  9:15 AM Minnie Perdomo DNP, APRN [facility]

## 2024-07-31 ENCOUNTER — TELEPHONE (OUTPATIENT)
Dept: SURGERY | Facility: CLINIC | Age: 69
End: 2024-07-31
Payer: MEDICARE

## 2024-07-31 NOTE — TELEPHONE ENCOUNTER
I called Bernadette and left a voicemail on her cell phone regarding the benign pathology findings from colonoscopy.  All 3 polyps were benign, with 2 showing some adenomatous growth.  I explained adenomatous polyps are considered precancerous, but polypectomy is curative.  I would recommend given the presence of 2 adenomatous colon polyps she will need a repeat screening colonoscopy in 5 years.    Jalyn, please update this patient's health maintenance tab and recall pool to reflect a 5-year interval.    Thanks,  PANDA

## 2024-08-25 DIAGNOSIS — F41.8 DEPRESSION WITH ANXIETY: ICD-10-CM

## 2024-08-26 RX ORDER — PAROXETINE 20 MG/1
20 TABLET, FILM COATED ORAL EVERY MORNING
Qty: 90 TABLET | Refills: 0 | OUTPATIENT
Start: 2024-08-26

## 2024-08-26 RX ORDER — PAROXETINE 30 MG/1
30 TABLET, FILM COATED ORAL EVERY MORNING
Qty: 90 TABLET | Refills: 2 | Status: SHIPPED | OUTPATIENT
Start: 2024-08-26

## 2024-09-05 NOTE — PROGRESS NOTES
Dereje Alvarez M.D.  Internal Medicine  [facility]  [address], [address]  [place] [address]  [phone]      Chief Complaint  Anxiety (F/U /), Depression, and Hypertension    SUBJECTIVE    History of Present Illness    Bernadette Perez is a 68 y.o. female with past medical history significant for hypertension, hyperlipidemia, history of melanoma, non-small cell lung cancer, depression/anxiety who presents to the office today as an established patient that last saw me on 6/5/2024.     She is here for follow-up for depression-she is on Paxil and hydroxyzine. Feeling well. Denies symptoms. Brother recently had a stroke and she is worried about him.     Hyperlipidemia on Lipitor 20 mg    Hypertension-on losartan 50 mg. Has good adherence. Does not monitor at home    Colonoscopy was significant for tubular adenoma.  Follow-up due in 5 years.    Saw Dr. Amezcua for acid reflux. Omeprazole works very well.     She has Mammogram Monday.       Review of Systems    Allergies   Allergen Reactions    Celebrex [Celecoxib] Other (See Comments)     ELEVATED LIVER ENZYMES    Codeine Nausea And Vomiting    Mobic [Meloxicam] Other (See Comments)     ELEVATED LIVER ENZYMES    Penicillins Hives     Beta lactam allergy details  Antibiotic reaction: hives, other (swelling)  Age at reaction: adult  Dose to reaction time: (!) minutes  Reason for antibiotic: sore throat  Epinephrine required for reaction?: unknown  Tolerated antibiotics: other, unknown       Chlorhexidine Rash    Contrast Dye (Echo Or Unknown Ct/Mr) Rash     Broke out in rash on arm    Tape Rash     PAPER TAPE IS OK.    ALLERGIC TO ADHESIVE TAPE        Outpatient Medications Marked as Taking for the 9/6/24 encounter (Office Visit) with Dereej Alvarez MD   Medication Sig Dispense Refill    atorvastatin (LIPITOR) 20 MG tablet TAKE 1 TABLET BY MOUTH EVERY DAY 90 tablet 2    Azelastine HCl 137 MCG/SPRAY solution 2 sprays into the nostril(s) as  directed by provider As Needed.      fluorometholone (Flarex) 0.1 % ophthalmic suspension Administer 1 drop to the right eye Daily.      fluticasone (FLONASE) 50 MCG/ACT nasal spray 1 spray into the nostril(s) as directed by provider Daily As Needed. Administer 2 sprays in each nostril for each dose.      hydrOXYzine (ATARAX) 25 MG tablet TAKE 1 TABLET BY MOUTH THREE TIMES A DAY AS NEEDED FOR ANXIETY 90 tablet 0    losartan (COZAAR) 50 MG tablet TAKE 1 TABLET BY MOUTH EVERY DAY 90 tablet 1    omeprazole (priLOSEC) 40 MG capsule Take 1 capsule by mouth Daily. 30 capsule 1    PARoxetine (PAXIL) 30 MG tablet Take 1 tablet by mouth Every Morning. 90 tablet 2    triamcinolone (KENALOG) 0.1 % cream APPLY TOPICALLY TO THE APPROPRIATE AREA TWICE A DAY AS DIRECTED 30 g 0    [DISCONTINUED] omeprazole (priLOSEC) 40 MG capsule Take 1 capsule by mouth Daily. 30 capsule 1        Past Medical History:   Diagnosis Date    Allergic     ALLERGY INJECTIONS - ALLERGY FIRST EVERY 2 WEEKS    Anxiety     Arthritis     Colon polyp     GERD (gastroesophageal reflux disease)     H/O bone density study 2016    dr torres [facility]    H/O complete eye exam 06/2016    History of lung biopsy     normal    Hyperlipidemia     Hypertension     Lung cancer 01/2024    Melanoma 06/15/2016    Right mid back    Pulmonary nodule     RIGHT     Past Surgical History:   Procedure Laterality Date    BRONCHOSCOPY WITH ION ROBOTIC ASSIST  12/18/2023    Procedure: BRONCHOSCOPY WITH ION ROBOT WITH BAL, BX AND FNA;  Surgeon: Robinson Epperson MD PhD;  Location: [facility] ENDOSCOPY;  Service: Robotics - Pulmonary;;  PRE/POST - lung nodule    CARPAL TUNNEL RELEASE Bilateral     COLONOSCOPY  2012    polyps     COLONOSCOPY N/A 7/29/2024    Procedure: Colonoscopy to cecum with internal hemorrhoid bandingx2, cold biopsy and hot snare polypectomies, and tattoo injection;  Surgeon: Cass Amin MD;  Location: [facility] ENDOSCOPY;  Service: General;  Laterality: N/A;   "pre: rectal bleeding  post: polyps, hemorrhoids (internal and external)    ENDOSCOPY      EYE SURGERY Right     CORNEA TRANSPLANT    HEMORRHOIDECTOMY      HYSTERECTOMY      LOBECTOMY Right 01/15/2024    Procedure: COMPLETE BRONCHOSCOPY, THORACOSCOPY WITH RIGHT LOWER LOBE WEDGE AND COMPLETION RIGHT LOWER LOBECTOMY WITH DAVINCI ROBOT, INTERCOSTAL NERVE BLOCK CRYO ABLATION, MEDIASTINAL LYMPH NODE DISSECTION;  Surgeon: Robinson Epperson MD PhD;  Location: [facility];  Service: Robotics - DaVinci;  Laterality: Right;    SKIN CANCER EXCISION      BACK     Family History   Problem Relation Age of Onset    Diabetes Mother     COPD Mother     Mental illness Father     Heart disease Father     Stroke Brother     Breast cancer Cousin     Breast cancer Maternal Cousin     Heart disease Other     Hypertension Other     Diabetes Other     Cancer Other     Thyroid disease Other     Arthritis Other     Malig Hyperthermia Neg Hx     reports that she has never smoked. She has never been exposed to tobacco smoke. She has never used smokeless tobacco. She reports current alcohol use. She reports that she does not use drugs.    OBJECTIVE    Vital Signs:   /90   Pulse 78   Ht 158.8 cm (62.52\")   Wt 69 kg (152 lb 3.2 oz)   SpO2 98%   BMI 27.38 kg/m²     Physical Exam  Constitutional:       Appearance: Normal appearance. She is normal weight.   Cardiovascular:      Rate and Rhythm: Normal rate and regular rhythm.      Heart sounds: Normal heart sounds. No murmur heard.  Pulmonary:      Effort: Pulmonary effort is normal.      Breath sounds: Normal breath sounds.   Skin:     General: Skin is warm and dry.   Neurological:      Mental Status: She is alert.   Psychiatric:         Mood and Affect: Mood normal.         Behavior: Behavior normal.         Thought Content: Thought content normal.            The following data was reviewed by: Dereje Alvarez MD on 09/06/2024:  Common labs          1/15/2024    15:49 1/16/2024    06:24 " 6/5/2024    08:05   Common Labs   Glucose 144  125  88    BUN 9  10  19    Creatinine 0.71  0.72  0.81    Sodium 140  137  141    Potassium 4.1  4.0  4.9    Chloride 104  100  103    Calcium 8.6  8.5  9.7    Total Protein   6.9    Albumin   4.6    Total Bilirubin   0.7    Alkaline Phosphatase   95    AST (SGOT)   22    ALT (SGPT)   16    WBC 11.94  15.01  5.66    Hemoglobin 11.6  11.3  13.1    Hematocrit 34.9  33.2  39.2    Platelets 344  359  449      Data reviewed : acute care visit              ASSESSMENT & PLAN     Diagnoses and all orders for this visit:    1. Depression with anxiety (Primary)  Assessment & Plan:  No symptoms on medication. Continue current management.       2. Primary hypertension  Assessment & Plan:  Hypertension is borderline  Medication changes per orders.  Dietary sodium restriction.  Weight loss.  Regular aerobic exercise.  Stop smoking.  Blood pressure will be reassessedin 3 months.    Orders:  -     amLODIPine (NORVASC) 5 MG tablet; Take 1 tablet by mouth Daily.  Dispense: 90 tablet; Refill: 0    3. Gastroesophageal reflux disease, unspecified whether esophagitis present  -     omeprazole (priLOSEC) 40 MG capsule; Take 1 capsule by mouth Daily.  Dispense: 30 capsule; Refill: 1          Health Maintenance Due   Topic Date Due    ZOSTER VACCINE (1 of 2) Never done    DXA SCAN  01/01/2018    MAMMOGRAM  07/12/2018    TDAP/TD VACCINES (2 - Tdap) 01/01/2024    COVID-19 Vaccine (3 - 2023-24 season) 09/01/2024    ANNUAL WELLNESS VISIT  11/07/2024        Follow Up  Return in about 3 months (around 12/6/2024) for Medicare Wellness.    Patient/family had no further questions at this time and verbalized understanding of the plan discussed today.

## 2024-09-06 ENCOUNTER — OFFICE VISIT (OUTPATIENT)
Dept: INTERNAL MEDICINE | Facility: CLINIC | Age: 69
End: 2024-09-06
Payer: MEDICARE

## 2024-09-06 VITALS
DIASTOLIC BLOOD PRESSURE: 90 MMHG | HEIGHT: 63 IN | BODY MASS INDEX: 26.97 KG/M2 | WEIGHT: 152.2 LBS | OXYGEN SATURATION: 98 % | SYSTOLIC BLOOD PRESSURE: 146 MMHG | HEART RATE: 78 BPM

## 2024-09-06 DIAGNOSIS — I10 PRIMARY HYPERTENSION: ICD-10-CM

## 2024-09-06 DIAGNOSIS — K21.9 GASTROESOPHAGEAL REFLUX DISEASE, UNSPECIFIED WHETHER ESOPHAGITIS PRESENT: ICD-10-CM

## 2024-09-06 DIAGNOSIS — F41.8 DEPRESSION WITH ANXIETY: Primary | ICD-10-CM

## 2024-09-06 PROCEDURE — 1126F AMNT PAIN NOTED NONE PRSNT: CPT | Performed by: STUDENT IN AN ORGANIZED HEALTH CARE EDUCATION/TRAINING PROGRAM

## 2024-09-06 PROCEDURE — 99214 OFFICE O/P EST MOD 30 MIN: CPT | Performed by: STUDENT IN AN ORGANIZED HEALTH CARE EDUCATION/TRAINING PROGRAM

## 2024-09-06 PROCEDURE — 3077F SYST BP >= 140 MM HG: CPT | Performed by: STUDENT IN AN ORGANIZED HEALTH CARE EDUCATION/TRAINING PROGRAM

## 2024-09-06 PROCEDURE — 3080F DIAST BP >= 90 MM HG: CPT | Performed by: STUDENT IN AN ORGANIZED HEALTH CARE EDUCATION/TRAINING PROGRAM

## 2024-09-06 RX ORDER — OMEPRAZOLE 40 MG/1
40 CAPSULE, DELAYED RELEASE ORAL DAILY
Qty: 30 CAPSULE | Refills: 1 | Status: SHIPPED | OUTPATIENT
Start: 2024-09-06

## 2024-09-06 RX ORDER — AMLODIPINE BESYLATE 5 MG/1
5 TABLET ORAL DAILY
Qty: 90 TABLET | Refills: 0 | Status: SHIPPED | OUTPATIENT
Start: 2024-09-06

## 2024-09-06 NOTE — ASSESSMENT & PLAN NOTE
Hypertension is borderline  Medication changes per orders.  Dietary sodium restriction.  Weight loss.  Regular aerobic exercise.  Stop smoking.  Blood pressure will be reassessedin 3 months.

## 2024-09-22 DIAGNOSIS — I10 PRIMARY HYPERTENSION: ICD-10-CM

## 2024-09-22 DIAGNOSIS — F41.8 DEPRESSION WITH ANXIETY: ICD-10-CM

## 2024-09-23 DIAGNOSIS — F41.8 DEPRESSION WITH ANXIETY: ICD-10-CM

## 2024-09-23 RX ORDER — AMLODIPINE BESYLATE 5 MG/1
5 TABLET ORAL DAILY
Qty: 90 TABLET | Refills: 0 | Status: SHIPPED | OUTPATIENT
Start: 2024-09-23

## 2024-09-23 RX ORDER — PAROXETINE 30 MG/1
30 TABLET, FILM COATED ORAL EVERY MORNING
Qty: 90 TABLET | Refills: 2 | Status: SHIPPED | OUTPATIENT
Start: 2024-09-23

## 2024-09-23 RX ORDER — HYDROXYZINE HYDROCHLORIDE 25 MG/1
25 TABLET, FILM COATED ORAL 3 TIMES DAILY PRN
Qty: 90 TABLET | Refills: 0 | Status: SHIPPED | OUTPATIENT
Start: 2024-09-23

## 2024-09-23 RX ORDER — PAROXETINE 20 MG/1
20 TABLET, FILM COATED ORAL EVERY MORNING
Qty: 90 TABLET | Refills: 0 | OUTPATIENT
Start: 2024-09-23

## 2024-10-14 NOTE — CASE MANAGEMENT/SOCIAL WORK
Discharge Planning Assessment  [facility]     Patient Name: Bernadette Perez  MRN: [id number]  Today's Date: 1/16/2024    Admit Date: 1/15/2024    Plan: Home   Discharge Needs Assessment       Row Name 01/16/24 1559       Living Environment    Current Living Arrangements home    Potentially Unsafe Housing Conditions none    Primary Care Provided by self    Provides Primary Care For no one    Quality of Family Relationships supportive       Transition Planning    Patient/Family Anticipates Transition to home with family    Patient/Family Anticipated Services at Transition none    Transportation Anticipated family or friend will provide       Discharge Needs Assessment    Equipment Currently Used at Home none    Concerns to be Addressed no discharge needs identified    Equipment Needed After Discharge none                   Discharge Plan       Row Name 01/16/24 1600       Plan    Plan Home    Patient/Family in Agreement with Plan yes    Plan Comments Met with pt at bedside. Introduced self, explained CCP role, facesheet verified.  Pt states she is independent with ADLs.  No history of DME, HH, or SNF. Plans to return home at discharge, no identified needs.  CCP will follow.  JOSUÉ Ambriz RN                  Continued Care and Services - Admitted Since 1/15/2024    Coordination has not been started for this encounter.          Demographic Summary       Row Name 01/16/24 1558       General Information    Admission Type inpatient    Arrived From home    Referral Source admission list    Reason for Consult discharge planning    Preferred Language English       Contact Information    Permission Granted to Share Info With family/designee  Vipul Obando (sister) [phone]                   Functional Status    No documentation.                  Psychosocial    No documentation.                  Abuse/Neglect    No documentation.                  Legal    No documentation.                  Substance Abuse    No  documentation.                  Patient Forms    No documentation.                     Rosario Ambriz RN     No

## 2024-11-14 ENCOUNTER — TELEPHONE (OUTPATIENT)
Dept: SURGERY | Facility: CLINIC | Age: 69
End: 2024-11-14
Payer: MEDICARE

## 2024-11-14 NOTE — TELEPHONE ENCOUNTER
OK FOR HUB TO SHARE:      Patient called in wanting new prescription sent in for Gabapentin. Her surgery was Jan of 2024 and she is outside of the acute post window for needing a refill. If she is having pain near her surgical site patient may benefit from pain management referral. If she is having other pain she would need to reach out to her PCP.       Otezla Pregnancy And Lactation Text: This medication is Pregnancy Category C and it isn't known if it is safe during pregnancy. It is unknown if it is excreted in breast milk.

## 2024-11-18 DIAGNOSIS — K21.9 GASTROESOPHAGEAL REFLUX DISEASE, UNSPECIFIED WHETHER ESOPHAGITIS PRESENT: ICD-10-CM

## 2024-11-18 RX ORDER — OMEPRAZOLE 40 MG/1
40 CAPSULE, DELAYED RELEASE ORAL DAILY
Qty: 30 CAPSULE | Refills: 1 | Status: SHIPPED | OUTPATIENT
Start: 2024-11-18

## 2024-12-08 DIAGNOSIS — E78.5 HYPERLIPIDEMIA, UNSPECIFIED HYPERLIPIDEMIA TYPE: ICD-10-CM

## 2024-12-08 DIAGNOSIS — I10 PRIMARY HYPERTENSION: Primary | ICD-10-CM

## 2024-12-12 LAB
ALBUMIN SERPL-MCNC: 4.6 G/DL (ref 3.9–4.9)
ALP SERPL-CCNC: 95 IU/L (ref 44–121)
ALT SERPL-CCNC: 16 IU/L (ref 0–32)
AST SERPL-CCNC: 21 IU/L (ref 0–40)
BILIRUB SERPL-MCNC: 0.5 MG/DL (ref 0–1.2)
BUN SERPL-MCNC: 20 MG/DL (ref 8–27)
BUN/CREAT SERPL: 25 (ref 12–28)
CALCIUM SERPL-MCNC: 9.7 MG/DL (ref 8.7–10.3)
CHLORIDE SERPL-SCNC: 100 MMOL/L (ref 96–106)
CHOLEST SERPL-MCNC: 191 MG/DL (ref 100–199)
CO2 SERPL-SCNC: 21 MMOL/L (ref 20–29)
CREAT SERPL-MCNC: 0.8 MG/DL (ref 0.57–1)
EGFRCR SERPLBLD CKD-EPI 2021: 81 ML/MIN/1.73
GLOBULIN SER CALC-MCNC: 2.2 G/DL (ref 1.5–4.5)
GLUCOSE SERPL-MCNC: 131 MG/DL (ref 70–99)
HDLC SERPL-MCNC: 75 MG/DL
LDLC SERPL CALC-MCNC: 72 MG/DL (ref 0–99)
LDLC/HDLC SERPL: 1 RATIO (ref 0–3.2)
POTASSIUM SERPL-SCNC: 4.8 MMOL/L (ref 3.5–5.2)
PROT SERPL-MCNC: 6.8 G/DL (ref 6–8.5)
SODIUM SERPL-SCNC: 137 MMOL/L (ref 134–144)
TRIGL SERPL-MCNC: 277 MG/DL (ref 0–149)
VLDLC SERPL CALC-MCNC: 44 MG/DL (ref 5–40)

## 2024-12-18 ENCOUNTER — OFFICE VISIT (OUTPATIENT)
Dept: INTERNAL MEDICINE | Facility: CLINIC | Age: 69
End: 2024-12-18
Payer: MEDICARE

## 2024-12-18 VITALS
OXYGEN SATURATION: 98 % | DIASTOLIC BLOOD PRESSURE: 80 MMHG | SYSTOLIC BLOOD PRESSURE: 122 MMHG | WEIGHT: 151.4 LBS | HEIGHT: 63 IN | BODY MASS INDEX: 26.82 KG/M2 | HEART RATE: 81 BPM

## 2024-12-18 DIAGNOSIS — E78.00 PURE HYPERCHOLESTEROLEMIA: ICD-10-CM

## 2024-12-18 DIAGNOSIS — I10 PRIMARY HYPERTENSION: Primary | ICD-10-CM

## 2024-12-18 PROCEDURE — 3074F SYST BP LT 130 MM HG: CPT | Performed by: STUDENT IN AN ORGANIZED HEALTH CARE EDUCATION/TRAINING PROGRAM

## 2024-12-18 PROCEDURE — 1126F AMNT PAIN NOTED NONE PRSNT: CPT | Performed by: STUDENT IN AN ORGANIZED HEALTH CARE EDUCATION/TRAINING PROGRAM

## 2024-12-18 PROCEDURE — G2211 COMPLEX E/M VISIT ADD ON: HCPCS | Performed by: STUDENT IN AN ORGANIZED HEALTH CARE EDUCATION/TRAINING PROGRAM

## 2024-12-18 PROCEDURE — 3078F DIAST BP <80 MM HG: CPT | Performed by: STUDENT IN AN ORGANIZED HEALTH CARE EDUCATION/TRAINING PROGRAM

## 2024-12-18 PROCEDURE — 99214 OFFICE O/P EST MOD 30 MIN: CPT | Performed by: STUDENT IN AN ORGANIZED HEALTH CARE EDUCATION/TRAINING PROGRAM

## 2024-12-18 RX ORDER — OXYCODONE AND ACETAMINOPHEN 5; 325 MG/1; MG/1
1 TABLET ORAL EVERY 4 HOURS PRN
COMMUNITY
End: 2024-12-18

## 2024-12-18 NOTE — PROGRESS NOTES
Dereje Alvarez M.D.  Internal Medicine  [facility]  [address], [address]  [place] [address]  [phone]      Chief Complaint  Hypertension (3 mth F/U /)    SUBJECTIVE    History of Present Illness    Bernadette Perez is a 68 y.o. female  with past medical history significant for hypertension, hyperlipidemia, history of melanoma, non-small cell lung cancer, depression/anxiety who presents to the office today as an established patient that last saw me on 9/6/2024.     depression-she is on Paxil and hydroxyzine. Only needs hydroxyzine as needed.        Hyperlipidemia on Lipitor 20 mg. Gets muscle cramps at night.      Hypertension-on losartan 50 mg.  Started on amlodipine last visit.  Has good adherence. Does not monitor at home.  Sometimes does not sit before checking BP.     Review of Systems    Allergies   Allergen Reactions    Celebrex [Celecoxib] Other (See Comments)     ELEVATED LIVER ENZYMES    Codeine Nausea And Vomiting    Mobic [Meloxicam] Other (See Comments)     ELEVATED LIVER ENZYMES    Penicillins Hives     Beta lactam allergy details  Antibiotic reaction: hives, other (swelling)  Age at reaction: adult  Dose to reaction time: (!) minutes  Reason for antibiotic: sore throat  Epinephrine required for reaction?: unknown  Tolerated antibiotics: other, unknown       Chlorhexidine Rash    Contrast Dye (Echo Or Unknown Ct/Mr) Rash     Broke out in rash on arm    Tape Rash     PAPER TAPE IS OK.    ALLERGIC TO ADHESIVE TAPE        Outpatient Medications Marked as Taking for the 12/18/24 encounter (Office Visit) with Dereje Alvarez MD   Medication Sig Dispense Refill    amLODIPine (NORVASC) 5 MG tablet TAKE 1 TABLET BY MOUTH EVERY DAY 90 tablet 0    atorvastatin (LIPITOR) 20 MG tablet TAKE 1 TABLET BY MOUTH EVERY DAY 90 tablet 2    Azelastine HCl 137 MCG/SPRAY solution Administer 2 sprays into the nostril(s) as directed by provider As Needed.      fluorometholone (Flarex) 0.1 %  ophthalmic suspension Administer 1 drop to the right eye Daily.      fluticasone (FLONASE) 50 MCG/ACT nasal spray Administer 1 spray into the nostril(s) as directed by provider Daily As Needed. Administer 2 sprays in each nostril for each dose.      hydrOXYzine (ATARAX) 25 MG tablet TAKE 1 TABLET BY MOUTH THREE TIMES A DAY AS NEEDED FOR ANXIETY 90 tablet 0    losartan (COZAAR) 50 MG tablet TAKE 1 TABLET BY MOUTH EVERY DAY 90 tablet 1    omeprazole (priLOSEC) 40 MG capsule TAKE 1 CAPSULE BY MOUTH EVERY DAY 30 capsule 1    PARoxetine (PAXIL) 30 MG tablet Take 1 tablet by mouth Every Morning. 90 tablet 2    triamcinolone (KENALOG) 0.1 % cream APPLY TOPICALLY TO THE APPROPRIATE AREA TWICE A DAY AS DIRECTED 30 g 0        Past Medical History:   Diagnosis Date    Allergic     ALLERGY INJECTIONS - ALLERGY FIRST EVERY 2 WEEKS    Anxiety     Arthritis     Colon polyp     GERD (gastroesophageal reflux disease)     H/O bone density study 2016    [facility] torres [facility]    H/O complete eye exam 06/2016    History of lung biopsy     normal    Hyperlipidemia     Hypertension     Lung cancer 01/2024    Melanoma 06/15/2016    Right mid back    Pulmonary nodule     RIGHT     Past Surgical History:   Procedure Laterality Date    BRONCHOSCOPY WITH ION ROBOTIC ASSIST  12/18/2023    Procedure: BRONCHOSCOPY WITH ION ROBOT WITH BAL, BX AND FNA;  Surgeon: Robinson Epperson MD PhD;  Location: [facility] ENDOSCOPY;  Service: Robotics - Pulmonary;;  PRE/POST - lung nodule    CARPAL TUNNEL RELEASE Bilateral     COLONOSCOPY  2012    polyps     COLONOSCOPY N/A 7/29/2024    Procedure: Colonoscopy to cecum with internal hemorrhoid bandingx2, cold biopsy and hot snare polypectomies, and tattoo injection;  Surgeon: Cass Amin MD;  Location: [facility] ENDOSCOPY;  Service: General;  Laterality: N/A;  pre: rectal bleeding  post: polyps, hemorrhoids (internal and external)    ENDOSCOPY      EYE SURGERY Right     CORNEA TRANSPLANT    HEMORRHOIDECTOMY    "   HYSTERECTOMY      LOBECTOMY Right 01/15/2024    Procedure: COMPLETE BRONCHOSCOPY, THORACOSCOPY WITH RIGHT LOWER LOBE WEDGE AND COMPLETION RIGHT LOWER LOBECTOMY WITH DAVINCI ROBOT, INTERCOSTAL NERVE BLOCK CRYO ABLATION, MEDIASTINAL LYMPH NODE DISSECTION;  Surgeon: Robinson Epperson MD PhD;  Location: [facility];  Service: Robotics - DaVinci;  Laterality: Right;    SKIN CANCER EXCISION      BACK     Family History   Problem Relation Age of Onset    Diabetes Mother     COPD Mother     Mental illness Father     Heart disease Father     Stroke Brother     Breast cancer Cousin     Breast cancer Maternal Cousin     Heart disease Other     Hypertension Other     Diabetes Other     Cancer Other     Thyroid disease Other     Arthritis Other     Malig Hyperthermia Neg Hx     reports that she has never smoked. She has never been exposed to tobacco smoke. She has never used smokeless tobacco. She reports current alcohol use. She reports that she does not use drugs.    OBJECTIVE    Vital Signs:   /80   Pulse 81   Ht 158.8 cm (62.52\")   Wt 68.7 kg (151 lb 6.4 oz)   SpO2 98%   BMI 27.23 kg/m²     Physical Exam  Constitutional:       Appearance: Normal appearance.   Cardiovascular:      Rate and Rhythm: Normal rate and regular rhythm.      Heart sounds: Normal heart sounds. No murmur heard.  Pulmonary:      Effort: Pulmonary effort is normal.      Breath sounds: Normal breath sounds.   Musculoskeletal:      Right lower leg: No edema.      Left lower leg: No edema.   Skin:     General: Skin is warm and dry.   Neurological:      Mental Status: She is alert.   Psychiatric:         Mood and Affect: Mood normal.         Behavior: Behavior normal.         Thought Content: Thought content normal.            The following data was reviewed by: Dereje Alvarez MD on 12/18/2024:  CMP          1/16/2024    06:24 6/5/2024    08:05 12/11/2024    13:35   CMP   Glucose 125  88  131    BUN 10  19  20    Creatinine 0.72  0.81  0.80  "   EGFR 91.8      Sodium 137  141  137    Potassium 4.0  4.9  4.8    Chloride 100  103  100    Calcium 8.5  9.7  9.7    Total Protein  6.9  6.8    Albumin  4.6  4.6    Globulin  2.3  2.2    Total Bilirubin  0.7  0.5    Alkaline Phosphatase  95  95    AST (SGOT)  22  21    ALT (SGPT)  16  16    BUN/Creatinine Ratio 13.9  23.5  25    Anion Gap 14.8        CBC w/diff          1/15/2024    15:49 1/16/2024    06:24 6/5/2024    08:05   CBC w/Diff   WBC 11.94  15.01  5.66    RBC 4.05  3.93  4.65    Hemoglobin 11.6  11.3  13.1    Hematocrit 34.9  33.2  39.2    MCV 86.2  84.5  84.3    MCH 28.6  28.8  28.2    MCHC 33.2  34.0  33.4    RDW 13.5  13.0  14.2    Platelets 344  359  449    Neutrophil Rel %  87.7  60.4    Immature Granulocyte Rel %  0.5     Lymphocyte Rel %  6.7  27.9    Monocyte Rel %  4.9  6.4    Eosinophil Rel %  0.0  3.2    Basophil Rel %  0.2  1.4      Lipid Panel          12/11/2024    13:35   Lipid Panel   Total Cholesterol 191    Triglycerides 277    HDL Cholesterol 75    VLDL Cholesterol 44    LDL Cholesterol  72    LDL/HDL Ratio 1.0                ASSESSMENT & PLAN     Diagnoses and all orders for this visit:    1. Primary hypertension (Primary)    2. Pure hypercholesterolemia    Blood pressure well-controlled today.  Continue current management.  Bring blood pressure cuff to next appointment as home readings are higher than office reading.    Gets DEXA through GYN.      Cholesterol well-controlled.    Try magnesium for muscle cramps at night.  Could consider modifying statin dosage if no improvement.        Health Maintenance Due   Topic Date Due    ZOSTER VACCINE (1 of 2) Never done    DXA SCAN  01/01/2018    TDAP/TD VACCINES (2 - Tdap) 01/01/2024    COVID-19 Vaccine (3 - 2024-25 season) 09/01/2024    ANNUAL WELLNESS VISIT  11/07/2024        Follow Up  Return in about 6 months (around 6/18/2025) for Medicare Wellness.    Patient/family had no further questions at this time and verbalized understanding of  the plan discussed today.

## 2024-12-23 DIAGNOSIS — I10 PRIMARY HYPERTENSION: ICD-10-CM

## 2024-12-24 RX ORDER — AMLODIPINE BESYLATE 5 MG/1
5 TABLET ORAL DAILY
Qty: 90 TABLET | Refills: 0 | Status: SHIPPED | OUTPATIENT
Start: 2024-12-24

## 2025-01-20 ENCOUNTER — HOSPITAL ENCOUNTER (OUTPATIENT)
Facility: HOSPITAL | Age: 70
Discharge: HOME | End: 2025-01-20
Admitting: NURSE PRACTITIONER
Payer: MEDICARE

## 2025-01-20 DIAGNOSIS — Z85.118 HISTORY OF LUNG CANCER: ICD-10-CM

## 2025-01-20 PROCEDURE — 71250 CT THORAX DX C-: CPT

## 2025-01-21 DIAGNOSIS — K21.9 GASTROESOPHAGEAL REFLUX DISEASE, UNSPECIFIED WHETHER ESOPHAGITIS PRESENT: ICD-10-CM

## 2025-01-21 RX ORDER — OMEPRAZOLE 40 MG/1
40 CAPSULE, DELAYED RELEASE ORAL DAILY
Qty: 90 CAPSULE | Refills: 21 | Status: SHIPPED | OUTPATIENT
Start: 2025-01-21

## 2025-01-30 ENCOUNTER — OFFICE VISIT (OUTPATIENT)
Dept: SURGERY | Facility: CLINIC | Age: 70
End: 2025-01-30
Payer: MEDICARE

## 2025-01-30 VITALS
DIASTOLIC BLOOD PRESSURE: 65 MMHG | OXYGEN SATURATION: 97 % | BODY MASS INDEX: 28.17 KG/M2 | HEART RATE: 78 BPM | WEIGHT: 156.6 LBS | SYSTOLIC BLOOD PRESSURE: 114 MMHG

## 2025-01-30 DIAGNOSIS — R91.8 LUNG NODULES: ICD-10-CM

## 2025-01-30 DIAGNOSIS — Z85.118 HISTORY OF LUNG CANCER: Primary | ICD-10-CM

## 2025-01-31 NOTE — PROGRESS NOTES
"Chief Complaint  Follow-up, status post RLL resection January 2024    Subjective        Bernadette Perez presents to [facility] THORACIC SURGERY  Primary Care Follow-Up      Ms. Perez is a very pleasant 69-year-old lady who is status post robot-assisted right lower lobectomy by Dr. Epperson January 2024 for a T1b N0 adenocarcinoma.  She was on oxygen for a brief period postoperatively.  She is a never smoker.  She remains active in her daily life and works at [employer].  She has some intermittent intercostal neuralgia.  She remains the primary caretaker of her grandson.  She has no new pulmonary complaints.  No recent illnesses.  She presents today in her baseline state of health with CT chest.        Objective   Vital Signs:  /65 (BP Location: Left arm, Patient Position: Sitting, Cuff Size: Adult)   Pulse 78   Wt 71 kg (156 lb 9.6 oz)   SpO2 97%   BMI 28.17 kg/m²   Estimated body mass index is 28.17 kg/m² as calculated from the following:    Height as of 12/18/24: 158.8 cm (62.52\").    Weight as of this encounter: 71 kg (156 lb 9.6 oz).               Physical Exam  Constitutional:       General: She is not in acute distress.     Appearance: Normal appearance. She is not ill-appearing.   HENT:      Head: Normocephalic and atraumatic.      Mouth/Throat:      Mouth: Mucous membranes are moist.   Cardiovascular:      Rate and Rhythm: Normal rate.   Pulmonary:      Effort: Pulmonary effort is normal. No respiratory distress.   Musculoskeletal:         General: Normal range of motion.      Cervical back: Normal range of motion and neck supple.   Skin:     General: Skin is warm and dry.   Neurological:      General: No focal deficit present.      Mental Status: She is alert.      Motor: No weakness.   Psychiatric:         Mood and Affect: Mood normal.         Thought Content: Thought content normal.        Result Review :    CT chest 1/20/2025: Postsurgical changes on the right as before.  Sub-7 mm " nodules are unchanged bilaterally.  No new or enlarging lung nodule.  No acute intrathoracic process.    CT chest 7/22/2024: Postsurgical changes from right lower lobectomy.  Stable tiny groundglass nodule in the right upper lobe.  Stable tiny groundglass nodule in the left lower lobe.  Scattered granulomatous calcifications as before.  No mediastinal or hilar lymphadenopathy.  No pleural pericardial effusion.          Assessment and Plan     Diagnoses and all orders for this visit:    1. History of lung cancer (Primary)  -     CT Chest Without Contrast; Future    2. Lung nodules  -     CT Chest Without Contrast; Future      Ms. Perez is status post right lower lobectomy in January 2024 for stage I non-small cell lung cancer.  There are some stable sub-7 mm nodules on her most recent CT chest.  No convincing evidence for recurrent or metastatic disease.  We will continue her surveillance with CT chest in 6 months and have her follow-up in the office to review the results.  She is in agreement with this plan.         I spent 31 minutes caring for Bernadette on this date of service. This time includes time spent by me in the following activities:preparing for the visit, reviewing tests, obtaining and/or reviewing a separately obtained history, performing a medically appropriate examination and/or evaluation , counseling and educating the patient/family/caregiver, ordering medications, tests, or procedures, documenting information in the medical record, independently interpreting results and communicating that information with the patient/family/caregiver, and care coordination  Follow Up     Return in about 6 months (around 7/30/2025) for Next scheduled follow up.  Patient was given instructions and counseling regarding her condition or for health maintenance advice. Please see specific information pulled into the AVS if appropriate.

## 2025-02-02 DIAGNOSIS — I10 PRIMARY HYPERTENSION: ICD-10-CM

## 2025-02-03 RX ORDER — LOSARTAN POTASSIUM 50 MG/1
TABLET ORAL
Qty: 90 TABLET | Refills: 1 | Status: SHIPPED | OUTPATIENT
Start: 2025-02-03

## 2025-04-15 RX ORDER — ATORVASTATIN CALCIUM 20 MG/1
20 TABLET, FILM COATED ORAL DAILY
Qty: 90 TABLET | Refills: 2 | Status: SHIPPED | OUTPATIENT
Start: 2025-04-15

## 2025-04-21 DIAGNOSIS — I10 PRIMARY HYPERTENSION: ICD-10-CM

## 2025-04-21 RX ORDER — AMLODIPINE BESYLATE 5 MG/1
5 TABLET ORAL DAILY
Qty: 90 TABLET | Refills: 0 | Status: SHIPPED | OUTPATIENT
Start: 2025-04-21

## 2025-05-05 DIAGNOSIS — I10 PRIMARY HYPERTENSION: ICD-10-CM

## 2025-05-05 RX ORDER — LOSARTAN POTASSIUM 50 MG/1
50 TABLET ORAL DAILY
Qty: 90 TABLET | Refills: 1 | Status: SHIPPED | OUTPATIENT
Start: 2025-05-05

## 2025-06-26 ENCOUNTER — OFFICE VISIT (OUTPATIENT)
Dept: INTERNAL MEDICINE | Facility: CLINIC | Age: 70
End: 2025-06-26
Payer: MEDICARE

## 2025-06-26 VITALS
DIASTOLIC BLOOD PRESSURE: 70 MMHG | HEART RATE: 81 BPM | HEIGHT: 63 IN | SYSTOLIC BLOOD PRESSURE: 123 MMHG | BODY MASS INDEX: 25.76 KG/M2 | WEIGHT: 145.4 LBS | OXYGEN SATURATION: 98 %

## 2025-06-26 DIAGNOSIS — E78.00 PURE HYPERCHOLESTEROLEMIA: ICD-10-CM

## 2025-06-26 DIAGNOSIS — C34.91 NON-SMALL CELL CANCER OF RIGHT LUNG: ICD-10-CM

## 2025-06-26 DIAGNOSIS — G47.62 NOCTURNAL LEG CRAMPS: ICD-10-CM

## 2025-06-26 DIAGNOSIS — Z85.820 HISTORY OF MELANOMA: ICD-10-CM

## 2025-06-26 DIAGNOSIS — I25.10 CORONARY ARTERY CALCIFICATION: ICD-10-CM

## 2025-06-26 DIAGNOSIS — I10 PRIMARY HYPERTENSION: ICD-10-CM

## 2025-06-26 DIAGNOSIS — R94.5 ABNORMAL RESULTS OF LIVER FUNCTION STUDIES: ICD-10-CM

## 2025-06-26 DIAGNOSIS — Z00.00 MEDICARE ANNUAL WELLNESS VISIT, SUBSEQUENT: Primary | ICD-10-CM

## 2025-06-26 NOTE — ASSESSMENT & PLAN NOTE
- On atorvastatin.  Has some nocturnal cramping.  -Triglycerides elevated on recent labs but this was nonfasting.  Her HDL was down as well.

## 2025-06-26 NOTE — PROGRESS NOTES
Subjective   The ABCs of the Annual Wellness Visit  Medicare Wellness Visit      Bernadette Perez is a 69 y.o.  with past medical history significant for hypertension, hyperlipidemia, history of melanoma, non-small cell lung cancer, depression/anxiety patient who presents for a Medicare Wellness Visit.    The following portions of the patient's history were reviewed and   updated as appropriate: allergies, current medications, past family history, past medical history, past social history, past surgical history, and problem list.    Compared to one year ago, the patient's physical   health is better.  Compared to one year ago, the patient's mental   health is the same.    Recent Hospitalizations:  She was not admitted to the hospital during the last year.     Current Medical Providers:  Patient Care Team:  Dereje Alvaerz MD as PCP - General (Internal Medicine)  Teodoro Kaur MD as Consulting Physician (Dermatology)  Jad Steward MD as Consulting Physician (Ophthalmology)  Minnie Perdomo DNP, APRN as Nurse Practitioner (Thoracic Surgery)  Gladys Victoria MD as Consulting Physician (Obstetrics and Gynecology)    Outpatient Medications Prior to Visit   Medication Sig Dispense Refill    amLODIPine (NORVASC) 5 MG tablet TAKE 1 TABLET BY MOUTH EVERY DAY 90 tablet 0    atorvastatin (LIPITOR) 20 MG tablet TAKE 1 TABLET BY MOUTH EVERY DAY 90 tablet 2    Azelastine HCl 137 MCG/SPRAY solution Administer 2 sprays into the nostril(s) as directed by provider As Needed.      fluorometholone (Flarex) 0.1 % ophthalmic suspension Administer 1 drop to the right eye Daily.      fluticasone (FLONASE) 50 MCG/ACT nasal spray Administer 1 spray into the nostril(s) as directed by provider Daily As Needed. Administer 2 sprays in each nostril for each dose.      hydrOXYzine (ATARAX) 25 MG tablet TAKE 1 TABLET BY MOUTH THREE TIMES A DAY AS NEEDED FOR ANXIETY 90 tablet 0    losartan (COZAAR) 50 MG tablet TAKE 1 TABLET BY MOUTH  "EVERY DAY 90 tablet 1    omeprazole (priLOSEC) 40 MG capsule TAKE 1 CAPSULE BY MOUTH EVERY DAY 90 capsule 21    PARoxetine (PAXIL) 30 MG tablet Take 1 tablet by mouth Every Morning. 90 tablet 2    triamcinolone (KENALOG) 0.1 % cream APPLY TOPICALLY TO THE APPROPRIATE AREA TWICE A DAY AS DIRECTED 30 g 0     No facility-administered medications prior to visit.     No opioid medication identified on active medication list. I have reviewed chart for other potential  high risk medication/s and harmful drug interactions in the elderly.      Aspirin is not on active medication list.  Aspirin use is indicated based on review of current medical condition/s. Pros and cons of this therapy have been discussed with this patient. Benefits of this medication outweigh potential harm.  Patient has been instructed to start taking this medication..    Patient Active Problem List   Diagnosis    Arthritis    Allergic rhinitis    Hyperlipidemia    Elevated liver enzymes    Gastroesophageal reflux disease    Depression with anxiety    Primary hypertension    Melanoma    Solitary pulmonary nodule    Non-small cell lung cancer    Rectal bleeding    Internal hemorrhoids    Colon polyps     Advance Care Planning Advance Directive is not on file.  ACP discussion was held with the patient during this visit. Patient does not have an advance directive, information provided.            Objective   Vitals:    06/26/25 0804   BP: 123/70   Pulse: 81   SpO2: 98%   Weight: 66 kg (145 lb 6.4 oz)   Height: 158.8 cm (62.52\")   PainSc: 0-No pain       Estimated body mass index is 26.15 kg/m² as calculated from the following:    Height as of this encounter: 158.8 cm (62.52\").    Weight as of this encounter: 66 kg (145 lb 6.4 oz).                Does the patient have evidence of cognitive impairment? No  Lab Results   Component Value Date    CHLPL 163 06/19/2025    TRIG 691 (H) 06/19/2025    HDL 32 (L) 06/19/2025    LDL 36 06/19/2025    VLDL 95 (H) " 2025                                                                                                Health  Risk Assessment    Smoking Status:  Social History     Tobacco Use   Smoking Status Never    Passive exposure: Never   Smokeless Tobacco Never     Alcohol Consumption:  Social History     Substance and Sexual Activity   Alcohol Use Yes    Comment: social       Fall Risk Screen  STEADI Fall Risk Assessment was completed, and patient is at LOW risk for falls.Assessment completed on:2025    Depression Screening   Little interest or pleasure in doing things? Not at all   Feeling down, depressed, or hopeless? Not at all   PHQ-2 Total Score 0      Health Habits and Functional and Cognitive Screenin/26/2025     8:05 AM   Functional & Cognitive Status   Do you have difficulty preparing food and eating? No   Do you have difficulty bathing yourself, getting dressed or grooming yourself? No   Do you have difficulty using the toilet? No   Do you have difficulty moving around from place to place? No   Do you have trouble with steps or getting out of a bed or a chair? No   Do you need help using the phone?  No   Are you deaf or do you have serious difficulty hearing?  No   Do you need help to go to places out of walking distance? No   Do you need help shopping? No   Do you need help preparing meals?  No   Do you need help with housework?  No   Do you need help with laundry? No   Do you need help taking your medications? No   Do you need help managing money? No   Do you ever drive or ride in a car without wearing a seat belt? No   Have you felt unusual fatigue (could be tiredness), stress, anger or loneliness in the last month? No   Who do you live with? Spouse   If you need help, do you have trouble finding someone available to you? No   Have you been bothered in the last four weeks by sexual problems? No   Do you have difficulty concentrating, remembering or making decisions? No            Age-appropriate Screening Schedule:  Refer to the list below for future screening recommendations based on patient's age, sex and/or medical conditions. Orders for these recommended tests are listed in the plan section. The patient has been provided with a written plan.    Health Maintenance List  Health Maintenance   Topic Date Due    ZOSTER VACCINE (1 of 2) Never done    DXA SCAN  01/01/2018    TDAP/TD VACCINES (2 - Tdap) 01/01/2024    COVID-19 Vaccine (3 - 2024-25 season) 09/01/2024    ANNUAL WELLNESS VISIT  11/07/2024    LIPID PANEL  06/19/2026    MAMMOGRAM  09/09/2026    COLORECTAL CANCER SCREENING  07/29/2029    Pneumococcal Vaccine 50+  Completed    HEPATITIS C SCREENING  Discontinued    INFLUENZA VACCINE  Discontinued                                                                                                                                                CMS Preventative Services Quick Reference  Risk Factors Identified During Encounter  Immunizations Discussed/Encouraged: Tdap, Shingrix, and COVID19    The above risks/problems have been discussed with the patient.  Pertinent information has been shared with the patient in the After Visit Summary.  An After Visit Summary and PPPS were made available to the patient.    Follow Up:   Next Medicare Wellness visit to be scheduled in 1 year.         Additional E&M Note during same encounter follows:  Patient has additional, significant, and separately identifiable condition(s)/problem(s) that require work above and beyond the Medicare Wellness Visit     Chief Complaint  Medicare Wellness-subsequent    Subjective    HPI  Bernadette is also being seen today for additional medical problem/s.       The patient presents for an annual Medicare wellness visit.    She reports experiencing severe leg spasms at night, predominantly in her left leg, which she describes as cramp-like pain. These spasms are not present during the day, even though she is on her feet from  "8:00 AM to 4:00 PM at work. She has not yet tried magnesium supplements for this issue. She has attempted to alleviate the discomfort by elevating her legs with a pillow and applying heat, which provides some relief. She also finds that stretching her legs helps to reduce the spasms.    She has been making efforts to reduce her alcohol consumption and does not use Tylenol. She has lost 20 pounds since starting her medication, attributing this weight loss to decreased alcohol intake. Her mental health status remains unchanged from the previous year. She has not had any hospital admissions in the past year and does not take aspirin. She does not have an advanced directive or living will.    She is uncertain about the need for a DEXA scan but believes she is not due for one. She has completed her mammogram and Pap smear. She is under the care of Dr. Denae Perdomo, a nurse practitioner, and Dr. Gladys Avery, an oncologist. She also consults with a dermatologist, Dr. Goff, and an ophthalmologist, Dr. Montes, annually. She has not seen her ophthalmologist recently following eye surgery. She continues to receive allergy injections every 2 weeks from [facility]. She is currently taking medication for acid reflux.    She monitors her blood pressure at home every 2 to 3 days, which typically reads around 140/70s. She occasionally experiences hot flashes.    PAST SURGICAL HISTORY:  Lung surgery    SOCIAL HISTORY  The patient drinks alcohol, usually having a drink after work and sometimes a beer depending on what she eats for supper. She has been trying to cut back on her alcohol consumption.    FAMILY HISTORY  The patient's mother had shingles.          Objective   Vital Signs:  /70   Pulse 81   Ht 158.8 cm (62.52\")   Wt 66 kg (145 lb 6.4 oz)   SpO2 98%   BMI 26.15 kg/m²   Physical Exam  Constitutional:       Appearance: Normal appearance.   Cardiovascular:      Rate and Rhythm: Normal rate and " regular rhythm.      Heart sounds: Normal heart sounds. No murmur heard.  Pulmonary:      Effort: Pulmonary effort is normal.      Breath sounds: Normal breath sounds.   Abdominal:      General: Abdomen is flat. There is no distension.      Palpations: Abdomen is soft.      Tenderness: There is no abdominal tenderness.   Skin:     General: Skin is warm and dry.   Neurological:      Mental Status: She is alert.   Psychiatric:         Mood and Affect: Mood normal.         Behavior: Behavior normal.         Thought Content: Thought content normal.               The following data was reviewed by: Dereje Alvarez MD on 06/26/2025:    CMP          12/11/2024    13:35 6/19/2025    15:29   CMP   Glucose 131  78    BUN 20  23.0    Creatinine 0.80  0.95    EGFR 81  65.4    Sodium 137  137    Potassium 4.8  4.0    Chloride 100  98    Calcium 9.7  8.9    Total Protein 6.8  6.2    Albumin 4.6  4.0    Globulin 2.2  2.2    Total Bilirubin 0.5  0.6    Alkaline Phosphatase 95  278    AST (SGOT) 21  87    ALT (SGPT) 16  69    Albumin/Globulin Ratio  1.8    BUN/Creatinine Ratio 25  24.2        Lipid Panel          12/11/2024    13:35 6/19/2025    15:29   Lipid Panel   Total Cholesterol 191  163    Triglycerides 277  691    HDL Cholesterol 75  32    VLDL Cholesterol 44  95    LDL Cholesterol  72  36    LDL/HDL Ratio 1.0  <0.00            Results  Labs   - LDL cholesterol: 36 mg/dL   - Triglycerides: Elevated   - Liver enzymes: Slightly elevated    Imaging   - CT scan of chest: Mild coronary artery calcifications observed           Assessment and Plan        Medicare annual wellness visit, subsequent  - LDL cholesterol levels optimal at 36; triglycerides elevated likely due to non-fasting conditions during the test.  - Blood pressure satisfactory today at 123/70.  - Advised to receive shingles vaccine at pharmacy; will receive tetanus vaccine today.  - COVID-19 booster recommended.  - Cholesterol and blood pressure levels to be monitored  every six months.       Pure hypercholesterolemia  - On atorvastatin.  Has some nocturnal cramping.  -Triglycerides elevated on recent labs but this was nonfasting.  Her HDL was down as well.       Primary hypertension  - amlodine and losartan  - monitors at home  - well controlled         Non-small cell cancer of right lung  -Follows with thoracic surgery       History of melanoma  -Follows with dermatology       Nocturnal leg cramps  - Reports severe leg cramps at night, primarily in the left leg, with no daytime symptoms.  - Has tried using a pillow and heat pad for relief.  - Will start taking magnesium at night before bed; moderate exercise, B complex vitamins, tonic water, and proper hydration recommended.  - If symptoms persist, further diagnostic tests and treatments will be considered.         Coronary artery calcification  - Mild coronary artery calcifications noted on scan.  - Will start taking low-dose aspirin (81 mg) as a preventative measure to protect the heart; can be obtained over the counter.  - Continue statin.  Monitor muscle cramps.       Abnormal results of liver function studies  - Liver enzymes slightly elevated; normal six months ago.  - Has significantly reduced alcohol intake.  - Recheck of metabolic panel will be conducted today to monitor liver enzymes.  Orders:    Comprehensive Metabolic Panel    Hepatitis panel, acute    Iron and TIBC    Ferritin                    Follow Up   No follow-ups on file.  Patient was given instructions and counseling regarding her condition or for health maintenance advice. Please see specific information pulled into the AVS if appropriate.  Patient or patient representative verbalized consent for the use of Ambient Listening during the visit with  Dereje Alvarez MD for chart documentation. 6/26/2025  08:49 EDT

## 2025-06-26 NOTE — PATIENT INSTRUCTIONS
"hat are nocturnal (nighttime) leg cramps?   Nighttime leg cramps cause pain and sudden muscle tightness in the legs, feet, or both. The cramps can wake you up from sleep. They can last for many minutes or just a few seconds.  Nighttime leg cramps are common in both adults and children. But as people get older, they are more likely to get them. About half of people older than 50 years get nighttime leg cramps.  What causes nighttime leg cramps?   Most nighttime leg cramps do not have a clear cause. When doctors do find causes, they can include:  ?Having an abnormal leg or foot structure - For example, having flat feet or a knee that bends in the wrong direction.  ?Sitting in an awkward position, or sitting too long in 1 position  ?Standing or walking a lot on concrete floors  ?Changes in your body's fluid balance - This can happen if you:  Take medicines called diuretics (also called \"water pills\")  Are on dialysis (a kind of treatment for kidney disease)  Sweat too much  ?Exercise  ?Having certain conditions - For example, Parkinson disease, diabetes, or low thyroid (also called hypothyroidism).  ?Being pregnant - Some pregnant people do not have enough of the mineral magnesium in their blood. This can cause leg cramps.  ?Taking certain medicines  What can I do to feel better?   You can try to:  ?Ride a stationary bike for a few minutes before bed - If you normally don't exercise much, this might help.  ?Do stretching exercises (picture 1).  ?Wear shoes with firm support, especially at the back of your foot around your heel.  ?Keep the bed covers loose at the foot of your bed and not tucked in.  ?Drink plenty of water, especially if you take diuretics. (Do this only if your doctor or nurse has not told you to limit the amount of water you drink.)  ?Limit alcohol and caffeine.  ?Stay cool when you exercise, and don't exercise in very hot weather or hot rooms.  If you get a cramp, slowly stretch the cramped muscle. To " prevent more cramps, you can try to:  ?Walk around or jiggle your leg or foot.  ?Lie down with your legs and feet up.  ?Take a hot shower with water spraying on the cramp for 5 minutes, or take a warm bath.  ?Rub the cramp with ice wrapped in a towel.  Should I see a doctor or nurse?   See a doctor or nurse if:  ?You wake up several times a night with leg cramps.  ?Your cramps keep you from getting enough sleep.  ?Your cramps are very painful.  ?You have cramps in other parts of your body, such as your upper back or belly.  Will I need tests?   Probably not. Your doctor or nurse will talk with you about your symptoms and do an exam to find out what could be causing your cramps. Depending on your symptoms and exam, you might need blood tests.  How are nighttime leg cramps treated?   Treatment is different for everyone. Most people have to try a few different things before they find a treatment that helps them.  Treatment options include:  ?Lifestyle changes - For example, you can exercise differently, do stretching exercises, wear shoes with good support, and drink enough fluids.  ?Taking supplements - These are pills, capsules, liquids, or tablets with minerals or vitamins your body needs. Tell your doctor or nurse about any minerals, vitamins, or herbal medicines you already take.  ?Stopping any medicines that could cause cramps - But do not stop taking any medicine unless your doctor or nurse says that it is OK.  ?Taking medicines - There are prescription medicines that improve sleep, relax muscles, calm overactive nerves, or help in other ways. Doctors and nurses prescribe medicines for nocturnal leg cramps only when other types of treatment do not work.  What if my child gets nocturnal leg cramps?   Nocturnal leg cramps are common in children. Talk to your child's doctor or nurse if your child:  ?Has leg cramps often  ?Cannot sleep well because of leg cramps  Nocturnal leg cramps can run in families. Tell your  doctor or nurse if someone else in your family also has nocturnal leg cramps.

## 2025-06-26 NOTE — LETTER
[facility]  Vaccine Consent Form    Patient Name:  Bernadette Perez  Patient :  1955     Vaccine(s) Ordered    Tdap Vaccine Greater Than or Equal To 8yo IM        Screening Checklist  The following questions should be completed prior to vaccination. If you answer “yes” to any question, it does not necessarily mean you should not be vaccinated. It just means we may need to clarify or ask more questions. If a question is unclear, please ask your healthcare provider to explain it.    Yes No   Any fever or moderate to severe illness today (mild illness and/or antibiotic treatment are not contraindications)?     Do you have a history of a serious reaction to any previous vaccinations, such as anaphylaxis, encephalopathy within 7 days, Guillain-Everest syndrome within 6 weeks, seizure?     Have you received any live vaccine(s) (e.g MMR, ANALILIA) or any other vaccines in the last month (to ensure duplicate doses aren't given)?     Do you have an anaphylactic allergy to latex (DTaP, DTaP-IPV, Hep A, Hep B, MenB, RV, Td, Tdap), baker’s yeast (Hep B, HPV), polysorbates (RSV, nirsevimab, PCV 20 and 21, Rotavirrus, Tdap, Shingrix), or gelatin (ANALILIA, MMR)?     Do you have an anaphylactic allergy to neomycin (Rabies, ANALILIA, MMR, IPV, Hep A), polymyxin B (IPV), or streptomycin (IPV)?      Any cancer, leukemia, AIDS, or other immune system disorder? (ANALILIA, MMR, RV)     Do you have a parent, brother, or sister with an immune system problem (if immune competence of vaccine recipient clinically verified, can proceed)? (MMR, ANALILIA)     Any recent steroid treatments for >2 weeks, chemotherapy, or radiation treatment? (ANALILIA, MMR)     Have you received antibody-containing blood transfusions or IVIG in the past 11 months (recommended interval is dependent on product)? (MMR, ANALILIA)     Have you taken antiviral drugs (acyclovir, famciclovir, valacyclovir for ANALILIA) in the last 24 or 48 hours, respectively?      Are you pregnant or planning to  "become pregnant within 1 month? (ANALILIA, MMR, HPV, IPV, MenB, Abrexvy; For Hep B- refer to Engerix-B; For RSV - Abrysvo is indicated for 32-36 weeks of pregnancy from September to January)     For infants, have you ever been told your child has had intussusception or a medical emergency involving obstruction of the intestine (Rotavirus)? If not for an infant, can skip this question.         *Ordering Physicians/APC should be consulted if \"yes\" is checked by the patient or guardian above.  I have received, read, and understand the Vaccine Information Statement (VIS) for each vaccine ordered.  I have considered my or my child's health status as well as the health status of my close contacts.  I have taken the opportunity to discuss my vaccine questions with my or my child's health care provider.   I have requested that the ordered vaccine(s) be given to me or my child.  I understand the benefits and risks of the vaccines.  I understand that I should remain in the clinic for 15 minutes after receiving the vaccine(s).  _________________________________________________________  Signature of Patient or Parent/Legal Guardian ____________________  Date     "

## 2025-06-26 NOTE — ASSESSMENT & PLAN NOTE
Orders:    Comprehensive Metabolic Panel    Hepatitis panel, acute    Iron and TIBC    Ferritin

## 2025-06-27 ENCOUNTER — RESULTS FOLLOW-UP (OUTPATIENT)
Dept: INTERNAL MEDICINE | Facility: CLINIC | Age: 70
End: 2025-06-27
Payer: MEDICARE

## 2025-06-27 DIAGNOSIS — R94.5 ABNORMAL RESULTS OF LIVER FUNCTION STUDIES: Primary | ICD-10-CM

## 2025-06-27 DIAGNOSIS — R74.8 ELEVATED ALKALINE PHOSPHATASE LEVEL: ICD-10-CM

## 2025-06-27 LAB
ALBUMIN SERPL-MCNC: 4 G/DL (ref 3.9–4.9)
ALP SERPL-CCNC: 273 IU/L (ref 44–121)
ALT SERPL-CCNC: 80 IU/L (ref 0–32)
AST SERPL-CCNC: 97 IU/L (ref 0–40)
BILIRUB SERPL-MCNC: 0.7 MG/DL (ref 0–1.2)
BUN SERPL-MCNC: 14 MG/DL (ref 8–27)
BUN/CREAT SERPL: 17 (ref 12–28)
CALCIUM SERPL-MCNC: 9.1 MG/DL (ref 8.7–10.3)
CHLORIDE SERPL-SCNC: 100 MMOL/L (ref 96–106)
CO2 SERPL-SCNC: 22 MMOL/L (ref 20–29)
CREAT SERPL-MCNC: 0.84 MG/DL (ref 0.57–1)
EGFRCR SERPLBLD CKD-EPI 2021: 76 ML/MIN/1.73
FERRITIN SERPL-MCNC: 880 NG/ML (ref 15–150)
GLOBULIN SER CALC-MCNC: 2.3 G/DL (ref 1.5–4.5)
GLUCOSE SERPL-MCNC: 88 MG/DL (ref 70–99)
HAV IGM SERPL QL IA: NEGATIVE
HBV CORE IGM SERPL QL IA: NEGATIVE
HBV SURFACE AG SERPL QL IA: NEGATIVE
HCV AB SERPL QL IA: NON REACTIVE
HCV AB SERPL QL IA: NORMAL
IRON SATN MFR SERPL: 62 % (ref 15–55)
IRON SERPL-MCNC: 144 UG/DL (ref 27–139)
POTASSIUM SERPL-SCNC: 4.3 MMOL/L (ref 3.5–5.2)
PROT SERPL-MCNC: 6.3 G/DL (ref 6–8.5)
SODIUM SERPL-SCNC: 137 MMOL/L (ref 134–144)
TIBC SERPL-MCNC: 234 UG/DL (ref 250–450)
UIBC SERPL-MCNC: 90 UG/DL (ref 118–369)

## 2025-06-27 NOTE — TELEPHONE ENCOUNTER
I called Bernadette Perez at [phone] at 16:37 EDT     There was no answer so I left voicemail with office callback.

## 2025-07-01 DIAGNOSIS — F41.8 DEPRESSION WITH ANXIETY: ICD-10-CM

## 2025-07-01 DIAGNOSIS — I10 PRIMARY HYPERTENSION: ICD-10-CM

## 2025-07-01 RX ORDER — PAROXETINE 30 MG/1
30 TABLET, FILM COATED ORAL EVERY MORNING
Qty: 90 TABLET | Refills: 1 | Status: SHIPPED | OUTPATIENT
Start: 2025-07-01

## 2025-07-01 RX ORDER — AMLODIPINE BESYLATE 5 MG/1
5 TABLET ORAL DAILY
Qty: 90 TABLET | Refills: 1 | Status: SHIPPED | OUTPATIENT
Start: 2025-07-01

## 2025-07-29 ENCOUNTER — HOSPITAL ENCOUNTER (OUTPATIENT)
Facility: HOSPITAL | Age: 70
Discharge: HOME | End: 2025-07-29
Payer: MEDICARE

## 2025-07-29 DIAGNOSIS — R91.8 LUNG NODULES: ICD-10-CM

## 2025-07-29 DIAGNOSIS — Z85.118 HISTORY OF LUNG CANCER: ICD-10-CM

## 2025-07-29 DIAGNOSIS — R94.5 ABNORMAL RESULTS OF LIVER FUNCTION STUDIES: ICD-10-CM

## 2025-07-29 PROCEDURE — 71250 CT THORAX DX C-: CPT

## 2025-07-29 PROCEDURE — 76705 ECHO EXAM OF ABDOMEN: CPT

## 2025-07-31 ENCOUNTER — OFFICE VISIT (OUTPATIENT)
Dept: SURGERY | Facility: CLINIC | Age: 70
End: 2025-07-31
Payer: MEDICARE

## 2025-07-31 VITALS
RESPIRATION RATE: 18 BRPM | SYSTOLIC BLOOD PRESSURE: 118 MMHG | WEIGHT: 152.4 LBS | OXYGEN SATURATION: 99 % | HEART RATE: 78 BPM | BODY MASS INDEX: 27.41 KG/M2 | DIASTOLIC BLOOD PRESSURE: 72 MMHG

## 2025-07-31 DIAGNOSIS — R91.8 LUNG NODULES: ICD-10-CM

## 2025-07-31 DIAGNOSIS — Z85.118 HISTORY OF LUNG CANCER: Primary | ICD-10-CM

## 2025-07-31 RX ORDER — ASPIRIN 81 MG/1
81 TABLET ORAL DAILY
COMMUNITY

## 2025-07-31 RX ORDER — PREDNISOLONE ACETATE 10 MG/ML
SUSPENSION/ DROPS OPHTHALMIC
COMMUNITY

## 2025-07-31 NOTE — PROGRESS NOTES
"Chief Complaint  History of lung cancer (CT - HX LUNG CANCER- 6 M FU)    Subjective        History of Present Illness  The patient is a pleasant 69-year-old lady who is status post right lower lobectomy by Dr. Epperson in 2024 for a T1b N0 adenocarcinoma. She presents today for follow-up.    She reports overall good health but continues to experience significant pain to the right chest wall, which she describes as a poking sensation. This discomfort intensifies when she bends over, causing her to feel as though her fat roll extends above her lungs or ribs. She has some gabapentin at home but is unsure if it has .    She also mentions that her liver enzymes were abnormal, prompting Dr. Alvarez to order a liver scan. She has abstained from alcohol for the past 3 weeks and notes an improvement in her condition.    SOCIAL HISTORY  She admits to drinking alcohol in the past but has not had any for over 3 weeks.    MEDICATIONS  Gabapentin.    Objective   Vital Signs:  /72 (BP Location: Left arm, Patient Position: Sitting, Cuff Size: Adult)   Pulse 78   Resp 18   Wt 69.1 kg (152 lb 6.4 oz)   SpO2 99%   BMI 27.41 kg/m²   Estimated body mass index is 27.41 kg/m² as calculated from the following:    Height as of 25: 158.8 cm (62.52\").    Weight as of this encounter: 69.1 kg (152 lb 6.4 oz).            Physical Exam  Constitutional:       General: She is not in acute distress.     Appearance: Normal appearance. She is not ill-appearing.   HENT:      Head: Normocephalic and atraumatic.      Nose: Nose normal.   Cardiovascular:      Rate and Rhythm: Normal rate.   Pulmonary:      Effort: Pulmonary effort is normal. No respiratory distress.   Musculoskeletal:         General: Normal range of motion.      Cervical back: Normal range of motion and neck supple.   Skin:     General: Skin is warm and dry.   Neurological:      General: No focal deficit present.      Mental Status: She is alert. Mental status " is at baseline.      Motor: No weakness.   Psychiatric:         Mood and Affect: Mood normal.         Thought Content: Thought content normal.          Physical Exam      Result Review :         The following results are independently reviewed and interpreted by myself.     Results  Imaging  CT chest from 07/29/2025 demonstrates postsurgical changes from right lower lobectomy. No recurrent evidence for malignancy. No new or enlarging pulmonary nodules. A small nodule measuring about 3 mm is present on the right lung.           Assessment and Plan   Diagnoses and all orders for this visit:    1. History of lung cancer (Primary)  -     CT Chest Without Contrast; Future    2. Lung nodules  -     CT Chest Without Contrast; Future        Assessment & Plan  1. Postoperative neuralgia.  She reports experiencing pain, described as a poking sensation, particularly when bending over. This is consistent with postoperative neuralgia, likely due to nerve regeneration and scar tissue. Gabapentin was discussed as a potential treatment to dull the nerve pain. She has some gabapentin at home, which she can use if needed. If the pain becomes bothersome, a referral to pain management for injections can be considered.    2. Liver enzyme abnormalities.  She mentioned that her liver enzymes were off, and she has abstained from alcohol for over 3 weeks, which has improved her condition. The liver scan results are pending and will be reviewed once available. She is advised to continue abstaining from alcohol.    3. Status post right lower lobectomy.  A CT scan of the chest from 07/29/2025 shows postsurgical changes from the right lower lobectomy with no recurrent evidence of malignancy and no new or enlarging pulmonary nodules. A small 3 mm nodule on the right lung remains unchanged. Another CT scan will be scheduled in 6 months to monitor for any changes.    Follow-up  The patient will follow up in 6 months.       I spent 36 minutes  caring for Bernadette on this date of service. This time includes time spent by me in the following activities:preparing for the visit, reviewing tests, obtaining and/or reviewing a separately obtained history, performing a medically appropriate examination and/or evaluation , counseling and educating the patient/family/caregiver, ordering medications, tests, or procedures, referring and communicating with other health care professionals , documenting information in the medical record, independently interpreting results and communicating that information with the patient/family/caregiver, and care coordination  Follow Up   Return in about 6 months (around 1/31/2026) for Next scheduled follow up.  Patient was given instructions and counseling regarding her condition or for health maintenance advice. Please see specific information pulled into the AVS if appropriate.     Patient or patient representative verbalized consent for the use of Ambient Listening during the visit with  Minnie Perdomo DNP, APRN for chart documentation. 7/31/2025  10:49 EDT

## 2025-08-05 ENCOUNTER — RESULTS FOLLOW-UP (OUTPATIENT)
Dept: INTERNAL MEDICINE | Facility: CLINIC | Age: 70
End: 2025-08-05
Payer: MEDICARE

## 2025-08-08 ENCOUNTER — TELEPHONE (OUTPATIENT)
Dept: INTERNAL MEDICINE | Facility: CLINIC | Age: 70
End: 2025-08-08
Payer: MEDICARE

## 2025-08-22 ENCOUNTER — OFFICE VISIT (OUTPATIENT)
Dept: INTERNAL MEDICINE | Facility: CLINIC | Age: 70
End: 2025-08-22
Payer: MEDICARE

## 2025-08-22 ENCOUNTER — HOSPITAL ENCOUNTER (OUTPATIENT)
Facility: HOSPITAL | Age: 70
Discharge: HOME | End: 2025-08-22
Payer: MEDICARE

## 2025-08-22 VITALS
DIASTOLIC BLOOD PRESSURE: 68 MMHG | SYSTOLIC BLOOD PRESSURE: 120 MMHG | HEIGHT: 63 IN | OXYGEN SATURATION: 98 % | WEIGHT: 149 LBS | BODY MASS INDEX: 26.4 KG/M2 | HEART RATE: 82 BPM

## 2025-08-22 DIAGNOSIS — M79.642 PAIN IN BOTH HANDS: ICD-10-CM

## 2025-08-22 DIAGNOSIS — M54.2 NECK PAIN: Primary | ICD-10-CM

## 2025-08-22 DIAGNOSIS — Z87.39 HISTORY OF RHEUMATOID ARTHRITIS: ICD-10-CM

## 2025-08-22 DIAGNOSIS — R94.5 ABNORMAL RESULTS OF LIVER FUNCTION STUDIES: ICD-10-CM

## 2025-08-22 DIAGNOSIS — M79.641 PAIN IN BOTH HANDS: ICD-10-CM

## 2025-08-22 PROCEDURE — 72050 X-RAY EXAM NECK SPINE 4/5VWS: CPT

## 2025-08-22 PROCEDURE — 3078F DIAST BP <80 MM HG: CPT | Performed by: STUDENT IN AN ORGANIZED HEALTH CARE EDUCATION/TRAINING PROGRAM

## 2025-08-22 PROCEDURE — 3074F SYST BP LT 130 MM HG: CPT | Performed by: STUDENT IN AN ORGANIZED HEALTH CARE EDUCATION/TRAINING PROGRAM

## 2025-08-22 PROCEDURE — 1126F AMNT PAIN NOTED NONE PRSNT: CPT | Performed by: STUDENT IN AN ORGANIZED HEALTH CARE EDUCATION/TRAINING PROGRAM

## 2025-08-22 PROCEDURE — 99214 OFFICE O/P EST MOD 30 MIN: CPT | Performed by: STUDENT IN AN ORGANIZED HEALTH CARE EDUCATION/TRAINING PROGRAM

## 2025-08-22 RX ORDER — METHYLPREDNISOLONE 4 MG/1
TABLET ORAL
Qty: 21 TABLET | Refills: 0 | Status: SHIPPED | OUTPATIENT
Start: 2025-08-22

## (undated) DEVICE — TROC BLADLES ANCHORPORT/OPTI LP 12X120MM 1P/U

## (undated) DEVICE — ADAPT SWVL FIBROPTIC BRONCH

## (undated) DEVICE — SOL NACL 0.9PCT 1000ML

## (undated) DEVICE — SENSR O2 OXIMAX FNGR A/ 18IN NONSTR

## (undated) DEVICE — COLUMN DRAPE

## (undated) DEVICE — VISION PROBE ADAPTER AND SUCTION ADAPTER

## (undated) DEVICE — Device: Brand: TISSUE RETRIEVAL SYSTEM

## (undated) DEVICE — SINGLE USE SUCTION VALVE MAJ-209: Brand: SINGLE USE SUCTION VALVE (STERILE)

## (undated) DEVICE — THE SINGLE USE ETRAP – POLYP TRAP IS USED FOR SUCTION RETRIEVAL OF ENDOSCOPICALLY REMOVED POLYPS.: Brand: ETRAP

## (undated) DEVICE — SINGLE USE BIOPSY VALVE MAJ-210: Brand: SINGLE USE BIOPSY VALVE (STERILE)

## (undated) DEVICE — TB PROSHIELD PROTECT PLSTC CLR

## (undated) DEVICE — SOL ANTISTICK CAUTRY ELECTROLUBE LF

## (undated) DEVICE — ANTIBACTERIAL UNDYED BRAIDED (POLYGLACTIN 910), SYNTHETIC ABSORBABLE SUTURE: Brand: COATED VICRYL

## (undated) DEVICE — GLV SURG BIOGEL LTX PF 7

## (undated) DEVICE — SINGLE-USE BIOPSY FORCEPS: Brand: RADIAL JAW 4

## (undated) DEVICE — CANN O2 ETCO2 FITS ALL CONN CO2 SMPL A/ 7IN DISP LF

## (undated) DEVICE — ELECTRD BLD EZ CLN MOD 2.5IN

## (undated) DEVICE — TUBING, SUCTION, 1/4" X 10', STRAIGHT: Brand: MEDLINE

## (undated) DEVICE — MULTIPLE BAND LIGATOR: Brand: SPEEDBAND SUPERVIEW SUPER 7

## (undated) DEVICE — SUT MNCRYL PLS ANTIB UD 4/0 PS2 18IN

## (undated) DEVICE — PATIENT RETURN ELECTRODE, SINGLE-USE, CONTACT QUALITY MONITORING, ADULT, WITH 9FT CORD, FOR PATIENTS WEIGING OVER 33LBS. (15KG): Brand: MEGADYNE

## (undated) DEVICE — SYR LL TP 10ML STRL

## (undated) DEVICE — PENCL ES MEGADINE EZ/CLEAN BUTN W/HOLSTR 10FT

## (undated) DEVICE — SNAR POLYP SENSATION STDOVL 27 240 BX40

## (undated) DEVICE — BLAKE CARDIO CONNECTOR 1:1: Brand: J-VAC

## (undated) DEVICE — LARGE BORE STOPCOCK WITH EXTENSION SET, MALE LUER LOCK ADAPTER WITH RETRACTABLE COLLAR

## (undated) DEVICE — KIT, SETUP, YNK, SCT HNDL, YCON: Brand: MEDLINE

## (undated) DEVICE — BIOPSY NEEDLE, 21G: Brand: FLEXISION

## (undated) DEVICE — SUCTION IRRIGATOR: Brand: ENDOWRIST

## (undated) DEVICE — KIT,ANTI FOG,W/SPONGE & FLUID,SOFT PACK: Brand: MEDLINE

## (undated) DEVICE — SUT ETHIB 0 CT2 CR8 18IN CX27D

## (undated) DEVICE — TOTAL TRAY, 16FR 10ML SIL FOLEY, URN: Brand: MEDLINE

## (undated) DEVICE — KT CLN CLEANOR SCPE

## (undated) DEVICE — ADAPT CLN BIOGUARD AIR/H2O DISP

## (undated) DEVICE — DRAIN,WOUND,ROUND,24FR,5/16",FULL-FLUTED: Brand: MEDLINE

## (undated) DEVICE — 3M™ STERI-DRAPE™ INSTRUMENT POUCH 1018L: Brand: STERI-DRAPE™

## (undated) DEVICE — BIOPSY NEEDLE, 19G: Brand: FLEXISION

## (undated) DEVICE — Device: Brand: BLACK EYE

## (undated) DEVICE — ARM DRAPE

## (undated) DEVICE — LOU THORACIC: Brand: MEDLINE INDUSTRIES, INC.

## (undated) DEVICE — SUREFORM 45 CURVED-TIP: Brand: SUREFORM

## (undated) DEVICE — THE CARR-LOCKE INJECTION NEEDLE IS A SINGLE USE, DISPOSABLE, FLEXIBLE SHEATH INJECTION NEEDLE USED FOR THE INJECTION OF VARIOUS TYPES OF MEDIA THROUGH FLEXIBLE ENDOSCOPES.

## (undated) DEVICE — GLV SURG SIGNATURE ESSENTIAL PF LTX SZ8

## (undated) DEVICE — 1000ML,PRESSURE INFUSER W/STOPCOCK: Brand: MEDLINE

## (undated) DEVICE — OASIS DRAIN, SINGLE, INLINE & ATS COMPATIBLE: Brand: OASIS

## (undated) DEVICE — VITAL SIGNS™ JACKSON-REES CIRCUITS: Brand: VITAL SIGNS™

## (undated) DEVICE — SEAL

## (undated) DEVICE — SWIVEL CONNECTOR

## (undated) DEVICE — NDL HYPO ECLPS SFTY 22G 1 1/2IN

## (undated) DEVICE — SPNG LAP CIGARETTE KITTNER 5MM STRL PK/5

## (undated) DEVICE — VLV SXN ENDO DISP STRL

## (undated) DEVICE — DBD-DRAPE,LAP,CHOLE,W/TROUGHS,STERILE: Brand: MEDLINE

## (undated) DEVICE — GOWN,SIRUS,NON REINFRCD,LARGE,SET IN SL: Brand: MEDLINE

## (undated) DEVICE — FRCP BIOP SUPER TRAX 1.7MM 110CM

## (undated) DEVICE — STRIP,CLOSURE,WOUND,MEDI-STRIP,1/2X4: Brand: MEDLINE

## (undated) DEVICE — THE STERILE LIGHT HANDLE COVER IS USED WITH STERIS SURGICAL LIGHTING AND VISUALIZATION SYSTEMS.

## (undated) DEVICE — ADHS SKIN SURG TISS VISC PREMIERPRO EXOFIN HI/VISC FAST/DRY

## (undated) DEVICE — UNDERGLV SURG BIOGEL INDICATOR LF PF 7.5

## (undated) DEVICE — KT ORCA ORCAPOD DISP STRL

## (undated) DEVICE — SYS PROB ABL/CRYO CRYOSPHERE 18IN

## (undated) DEVICE — VLV PRT BRONCH LIP LTX DISP

## (undated) DEVICE — Device: Brand: ION

## (undated) DEVICE — STAPLER SHEATH: Brand: ENDOWRIST

## (undated) DEVICE — LP VESL MAXI 2.5X1MM RED 2PK

## (undated) DEVICE — APPL CHLORAPREP HI/LITE 26ML ORNG

## (undated) DEVICE — ST TBG AIRSEAL FLTR TRI LUM